# Patient Record
Sex: MALE | Race: WHITE | NOT HISPANIC OR LATINO | Employment: FULL TIME | URBAN - METROPOLITAN AREA
[De-identification: names, ages, dates, MRNs, and addresses within clinical notes are randomized per-mention and may not be internally consistent; named-entity substitution may affect disease eponyms.]

---

## 2017-01-26 ENCOUNTER — TELEPHONE (OUTPATIENT)
Dept: FAMILY MEDICINE | Facility: CLINIC | Age: 42
End: 2017-01-26

## 2017-01-26 NOTE — TELEPHONE ENCOUNTER
Patient informed as below.  Said he may be able to get a coupon on line for 50% off and will see if he can afford it then.  Christine Rdz RN

## 2017-01-26 NOTE — TELEPHONE ENCOUNTER
Please inform pt this med is not ever covered by insurance and must be paid for out of pocket.    Lyndsey Melendrez, CNP

## 2017-01-26 NOTE — TELEPHONE ENCOUNTER
Writer called patient left non detailed message requesting return call to clinic and ask to speak with nurse    Javier Bradford RN

## 2017-01-26 NOTE — TELEPHONE ENCOUNTER
Received notification from [employer] that Viagra is not covered by the patient's insurance. (PA already started by [facility]:[id number])    Would you like me to start a PA?    Flaquita Escalante, CMA

## 2019-09-30 ENCOUNTER — OFFICE VISIT (OUTPATIENT)
Dept: URGENT CARE | Facility: URGENT CARE | Age: 44
End: 2019-09-30
Payer: COMMERCIAL

## 2019-09-30 VITALS
HEART RATE: 116 BPM | WEIGHT: 167 LBS | BODY MASS INDEX: 24.66 KG/M2 | DIASTOLIC BLOOD PRESSURE: 80 MMHG | OXYGEN SATURATION: 97 % | SYSTOLIC BLOOD PRESSURE: 135 MMHG | TEMPERATURE: 98.2 F

## 2019-09-30 DIAGNOSIS — L08.9 SKIN INFECTION: Primary | ICD-10-CM

## 2019-09-30 DIAGNOSIS — L30.0 NUMMULAR DERMATITIS: ICD-10-CM

## 2019-09-30 PROCEDURE — 99203 OFFICE O/P NEW LOW 30 MIN: CPT | Performed by: NURSE PRACTITIONER

## 2019-09-30 RX ORDER — TRIAMCINOLONE ACETONIDE 1 MG/G
OINTMENT TOPICAL
Qty: 80 G | Refills: 3 | Status: SHIPPED | OUTPATIENT
Start: 2019-09-30 | End: 2023-02-17

## 2019-09-30 RX ORDER — CEPHALEXIN 500 MG/1
500 CAPSULE ORAL 4 TIMES DAILY
Qty: 28 CAPSULE | Refills: 0 | Status: SHIPPED | OUTPATIENT
Start: 2019-09-30 | End: 2019-10-07

## 2019-09-30 RX ORDER — HYDROXYZINE PAMOATE 25 MG/1
25 CAPSULE ORAL 4 TIMES DAILY PRN
Qty: 60 CAPSULE | Refills: 1 | Status: SHIPPED | OUTPATIENT
Start: 2019-09-30 | End: 2023-02-17

## 2019-09-30 RX ORDER — CLOBETASOL PROPIONATE 0.5 MG/G
OINTMENT TOPICAL
Qty: 80 G | Refills: 3 | Status: SHIPPED | OUTPATIENT
Start: 2019-09-30 | End: 2023-07-31

## 2019-09-30 RX ORDER — PREDNISONE 20 MG/1
40 TABLET ORAL DAILY
Qty: 10 TABLET | Refills: 0 | Status: SHIPPED | OUTPATIENT
Start: 2019-09-30 | End: 2019-10-05

## 2019-09-30 ASSESSMENT — ENCOUNTER SYMPTOMS
BACK PAIN: 0
DIZZINESS: 0
COUGH: 0
NAUSEA: 0
DIARRHEA: 0
ABDOMINAL PAIN: 0
ADENOPATHY: 0
FEVER: 0
MYALGIAS: 0
RHINORRHEA: 0
SORE THROAT: 0
HEADACHES: 0
VOMITING: 0
LIGHT-HEADEDNESS: 0

## 2019-09-30 NOTE — PATIENT INSTRUCTIONS
Cephalexin four times a day for 7 days  Hydroxyzine four times a day as needed for itching  Triamcinolone cream twice a day as needed to rash  Clobetasol at night to rash  Prednisone daily for 5 days  Push fluids  Tylenol and ibuprofen to help with pain  Cool compress can help with itching.

## 2019-09-30 NOTE — PROGRESS NOTES
SUBJECTIVE:   Antonio Burger is a 44 year old male presenting with a chief complaint of   Chief Complaint   Patient presents with     Urgent Care     Pt in clinic to have eval for rash on legs, feet, hands and elbows.     Derm Problem       He is a new patient of [facility].   Rash    Onset of rash was 2 month(s) ago.   Course of illness is worsening.  Severity moderately severe  Current and Associated symptoms: itching, burning, painful, red and blistering   Location of the rash: Bilateral ankles, legs, bilateral elbows, bilateral hands.   Previous history of a similar rash? Yes  Recent exposure history: none known  Denies exposure to: animals, dietary change, environmental allergens, impetigo, medications, new household products, new skincare products, ringworm, scabies, strep, tick (unknown type) and viral illness  Associated symptoms include: nothing.  Treatment measures tried include: steriod cream  History of nummular eczema.       Review of Systems   Constitutional: Negative for fever.   HENT: Negative for congestion, rhinorrhea and sore throat.    Eyes: Negative for visual disturbance.   Respiratory: Negative for cough.    Cardiovascular: Negative for chest pain.   Gastrointestinal: Negative for abdominal pain, diarrhea, nausea and vomiting.   Musculoskeletal: Negative for back pain and myalgias.   Skin: Positive for rash.   Allergic/Immunologic: Negative for environmental allergies and food allergies.   Neurological: Negative for dizziness, light-headedness and headaches.   Hematological: Negative for adenopathy.       Past Medical History:   Diagnosis Date     Anxiety      Depressive disorder      Lipoma of other specified sites  2007     neck area- plans observation     Narcolepsy without cataplexy in conditions classified elsewhere      Tobacco use disorder quit on 12/26/11    Smokes 1 ppd. Started at 16 years     Family History   Problem Relation Age of Onset     Diabetes Mother         type 2      Hypertension Mother      Cancer Maternal Grandmother         stomach     Cancer Paternal Grandmother         ?     Depression Mother      Gastrointestinal Disease Father         reflux     Current Outpatient Medications   Medication Sig Dispense Refill     cephALEXin (KEFLEX) 500 MG capsule Take 1 capsule (500 mg) by mouth 4 times daily for 7 days 28 capsule 0     clobetasol (TEMOVATE) 0.05 % external ointment Apply thin layer at night to affected area on backs of hands and legs. Cover legs with saran wrap. 80 g 3     hydrOXYzine (VISTARIL) 25 MG capsule Take 1 capsule (25 mg) by mouth 4 times daily as needed for itching 60 capsule 1     Methylphenidate HCl (RITALIN PO) Take 10 mg by mouth 8 times daily.         predniSONE (DELTASONE) 20 MG tablet Take 2 tablets (40 mg) by mouth daily for 5 days 10 tablet 0     triamcinolone (KENALOG) 0.1 % external ointment Apply topically twice daily to affected areas as needed. 80 g 3     desonide (DESOWEN) 0.05 % cream APPLY TWICE DAILY TO FACE IF ECZEMA IS FLARING (Patient not taking: Reported on 2019) 60 g 0     sildenafil (VIAGRA) 100 MG tablet Take 0.5-1 tablets ( mg) by mouth daily as needed for erectile dysfunction Take 30 min to 4 hours before intercourse.  Never use with nitroglycerin, terazosin or doxazosin. (Patient not taking: Reported on 2019) 6 tablet 11     varenicline (CHANTIX STARTING MONTH ) 0.5 MG X 11 & 1 MG X 42 tablet Take 0.5 mg tab daily for 3 days, then 0.5 mg tab twice daily for 4 days, then 1 mg twice daily. (Patient not taking: Reported on 2019) 53 tablet 2     Social History     Tobacco Use     Smoking status: Former Smoker     Packs/day: 1.00     Years: 13.00     Pack years: 13.00     Types: Cigarettes     Last attempt to quit: 2011     Years since quittin.7     Smokeless tobacco: Never Used   Substance Use Topics     Alcohol use: Yes     Comment: occ       OBJECTIVE  /80   Pulse 116   Temp 98.2  F (36.8  C)  (Oral)   Wt 75.8 kg (167 lb)   SpO2 97%   BMI 24.66 kg/m      Physical Exam  Vitals signs and nursing note reviewed.   Constitutional:       Appearance: Normal appearance. He is well-developed.   HENT:      Mouth/Throat:      Mouth: Mucous membranes are moist.      Pharynx: Oropharynx is clear.   Cardiovascular:      Rate and Rhythm: Normal rate and regular rhythm.      Heart sounds: Normal heart sounds.   Pulmonary:      Effort: Pulmonary effort is normal.      Breath sounds: Normal breath sounds and air entry.   Skin:     Comments: Patient has erythematous, dry, flaking, and plaque like lesions note to bilateral feet, ankles, lower legs, bilateral hands and elbows. Does also have noted erythematous swelling lesions as a secondary skin infection from itching noted.    Neurological:      Mental Status: He is alert and oriented to person, place, and time.   Psychiatric:         Behavior: Behavior is cooperative.           ASSESSMENT:      ICD-10-CM    1. Skin infection L08.9 cephALEXin (KEFLEX) 500 MG capsule     hydrOXYzine (VISTARIL) 25 MG capsule     DERMATOLOGY REFERRAL   2. Nummular dermatitis L30.0 hydrOXYzine (VISTARIL) 25 MG capsule     clobetasol (TEMOVATE) 0.05 % external ointment     triamcinolone (KENALOG) 0.1 % external ointment     predniSONE (DELTASONE) 20 MG tablet     DERMATOLOGY REFERRAL        Medical Decision Making:    Differential Diagnosis:  Rash: Atopic dermatitis  Cellulitis  Dermatitis  Eczema  Inflammation  Non-specific rash  Urticaria    Serious Comorbid Conditions:  Adult:  None    PLAN:  Discussed with patient that rash does appear to be a nummular dermatitis/eczema as previous diagnosed in the past, however, also appears to have a secondary skin infection like due to opening up of the skin due to itching. Will treat with Cephalexin four times a day for 7 days. Hydroxyzine four times a day as needed for itching. Triamcinolone cream twice a day as needed to rash. Clobetasol at night to  rash. Prednisone daily for 5 days. Additional symptomatic treatment recommendations discussed. Will also do referral to dermatology for follow up. Education was added to AVS. Patient was agreeable to plan and verbalized understanding.     Followup:    If not improving in 5-7 days or if condition worsens, follow up with your Primary Care Provider    Patient Instructions   Cephalexin four times a day for 7 days  Hydroxyzine four times a day as needed for itching  Triamcinolone cream twice a day as needed to rash  Clobetasol at night to rash  Prednisone daily for 5 days  Push fluids  Tylenol and ibuprofen to help with pain  Cool compress can help with itching.

## 2019-10-03 ENCOUNTER — OFFICE VISIT (OUTPATIENT)
Dept: DERMATOLOGY | Facility: CLINIC | Age: 44
End: 2019-10-03
Attending: NURSE PRACTITIONER
Payer: COMMERCIAL

## 2019-10-03 DIAGNOSIS — L30.9 DERMATITIS: Primary | ICD-10-CM

## 2019-10-03 RX ORDER — CLOBETASOL PROPIONATE 0.5 MG/G
OINTMENT TOPICAL
Qty: 60 G | Refills: 3 | Status: SHIPPED | OUTPATIENT
Start: 2019-10-03 | End: 2021-09-15

## 2019-10-03 RX ORDER — TRIAMCINOLONE ACETONIDE 1 MG/G
OINTMENT TOPICAL
Qty: 454 G | Refills: 3 | Status: SHIPPED | OUTPATIENT
Start: 2019-10-03 | End: 2022-05-31

## 2019-10-03 ASSESSMENT — PAIN SCALES - GENERAL: PAINLEVEL: NO PAIN (1)

## 2019-10-03 NOTE — PROGRESS NOTES
"[facility] Dermatology Note      Dermatology Problem List:  1. Dermatitis, bilateral lower extremities, upper extremities, hands. Prior biopsy 2012 with non-specific dermal inflammation. Ddx nummular dermatitis / atopic dermatitis, though also considered psoriasis / psoriasiform drug, less likely lichen planus, atypical manifestation of an autoimmune connective tissue disorder.    - triam 0.1% ointment /  clobetasol 0.05% ointment BID PRN for legs / arms  - desonide cream BID PRN for face  - prior tx: oral prednisone    CC:   Chief Complaint   Patient presents with     Derm Problem     Antonio is here today for Nummular Dermatitis follow up. Antonio is a previous patient of Dr. Kang. Antonio states \" I am on a shit ton of steroids\" which are helping to calm down his flare ups.      Encounter Date: Oct 3, 2019    History of Present Illness:  Mr. Antonio Burger is a 44 year old male who presents as a follow-up for suspected nummular dermatitis. The patient has a complicated past medical history and has prior been admitted for exuberant dermatitis on his bilateral lower extremities and hands. Biopsies in 2012 demonstrated non-specific dermal inflammation with some mast cells. He has since largely been managed on topical steroids with a presumptive diagnosis of nummular dermatitis. He reports a recent flare in the last week with numerous, very pruritic lesions on the bilateral lower extremities that became eroded from excessive scratching. He presented to ED/urgent care and was prescribed a short course of oral prednisone for which he is completing tomorrow. He reports significant improvement with prednisone. At home, he uses combination of triam 0.1% ointment and clobetasol 0.5% ointment for active areas, nearly \"all the time\". He reports no new inciting events. No recent illness, fevers, chills. No new medications. He is frustrated that his condition has not improved and he has continued to have to " "use topical steroids to \"keep things at bay\". Health is otherwise stable. No other skin concerns.      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 Started at 16 years     Past Surgical History:   Procedure Laterality Date     NO HISTORY OF SURGERY         Social History:  Patient reports that he quit smoking about 7 years ago. His smoking use included cigarettes. He has a 13.00 pack-year smoking history. He has never used smokeless tobacco. He reports current alcohol use. He reports that he does not use drugs.    Family History:  Family History   Problem Relation Age of Onset     Diabetes Mother         type 2     Hypertension Mother      Cancer Maternal Grandmother         stomach     Cancer Paternal Grandmother         ?     Depression Mother      Gastrointestinal Disease Father         reflux       Medications:  Current Outpatient Medications   Medication Sig Dispense Refill     cephALEXin (KEFLEX) 500 MG capsule Take 1 capsule (500 mg) by mouth 4 times daily for 7 days 28 capsule 0     clobetasol (TEMOVATE) 0.05 % external ointment Apply thin layer at night to affected area on backs of hands and legs. Cover legs with saran wrap. 80 g 3     desonide (DESOWEN) 0.05 % cream APPLY TWICE DAILY TO FACE IF ECZEMA IS FLARING (Patient not taking: Reported on 9/30/2019) 60 g 0     hydrOXYzine (VISTARIL) 25 MG capsule Take 1 capsule (25 mg) by mouth 4 times daily as needed for itching 60 capsule 1     Methylphenidate HCl (RITALIN PO) Take 10 mg by mouth 8 times daily.         predniSONE " (DELTASONE) 20 MG tablet Take 2 tablets (40 mg) by mouth daily for 5 days 10 tablet 0     sildenafil (VIAGRA) 100 MG tablet Take 0.5-1 tablets ( mg) by mouth daily as needed for erectile dysfunction Take 30 min to 4 hours before intercourse.  Never use with nitroglycerin, terazosin or doxazosin. (Patient not taking: Reported on 9/30/2019) 6 tablet 11     triamcinolone (KENALOG) 0.1 % external ointment Apply topically twice daily to affected areas as needed. 80 g 3     varenicline (CHANTIX STARTING MONTH PAK) 0.5 MG X 11 & 1 MG X 42 tablet Take 0.5 mg tab daily for 3 days, then 0.5 mg tab twice daily for 4 days, then 1 mg twice daily. (Patient not taking: Reported on 9/30/2019) 53 tablet 2     Allergies   Allergen Reactions     No Known Drug Allergies      Review of Systems:  -Skin Establ Pt: The patient denies any new rash, pruritus, or lesions that are symptomatic, changing or bleeding, except as per HPI.  -Constitutional: Otherwise feeling well today, in usual state of health.  -HEENT: Patient denies nonhealing oral sores.  -Skin: As above in HPI. No additional skin concerns.    Physical exam:  Vitals: There were no vitals taken for this visit.  GEN: This is a well developed, well-nourished male in no acute distress, in a pleasant mood.    SKIN: Focused examination of the face, neck, chest, abdomen, back, upper and lower extremities including hands/feet was performed.  - Sykes skin type: II  - Numerous pink scaly thick plaques on bilateral lower extremities some with central crusted erosions in linear pattern.  - Pink scaly plaques over DIP/PIP/MCP joints bilaterally  - Lichenified scaly papules on volar wrists bilaterally and elbows.   - No other lesions of concern on areas examined.     Impression/Plan:    1. Dermatitis, bilateral lower extremities, upper extremities. Prior biopsy in 2012 with non-specific findings. Prior managed as nummular dermatitis largely with topical steroids. Recent flare with  unknown trigger. Patient has a constellation of atypical findings suggestive of possible alternative diagnosis, including dipika-articular plaques that could be suggestive of psoriasis or an atypical presentation of a connective tissue disease such as dermatomyositis, more lichenoid appearing papules on volar wrists and elbows, which could be consistent with a lichenified eczematous process or lichen planus. I felt the utility of biopsy was minimal given the patient has recently been taking prednisone. Recommending completing course and continue with use of topical steroids. Will arrange for close follow-up and consider repeat biopsy at that visit.       Finish prednisone course    Start triam 0.1% ointment BID for arms and legs    Start clobetasol 0.05% ointment BID PRN for any problem areas; can perform under saran wrap occlusion    Follow-up in 4 weeks and consider biopsy at visit if not improved    CC Krish Geronimo, CNP  [address] [place] [address] on close of this encounter.  Follow-up in 4 weeks, earlier for new or changing lesions.       Staff Involved:  Staff Only    Gaston Spivey MD    Department of Dermatology  [facility]: Phone: [phone], Fax:[phone]  [facility]: Phone: [phone], Fax: [phone]

## 2019-10-03 NOTE — PATIENT INSTRUCTIONS
1. Finish prednisone taper  2. Start triamcinolone 0.1% ointment twice daily to rash on arms and legs.   3. Start clobetasol 0.05% ointment twice daily to problem areas. Can perform under occlusion at nighttime.   4. Moisturize at least twice daily.   5. Follow-up in 4 weeks.

## 2019-10-03 NOTE — LETTER
"10/3/2019       RE: Antonio Burger  [address]  [place] [address]     Dear Colleague,    Thank you for referring your patient, Antonio Burger, to the [facility] DERMATOLOGY at [facility]. Please see a copy of my visit note below.    [facility] Dermatology Note      Dermatology Problem List:  1. Dermatitis, bilateral lower extremities, upper extremities, hands. Prior biopsy 2012 with non-specific dermal inflammation. Ddx nummular dermatitis / atopic dermatitis, though also considered psoriasis / psoriasiform drug, less likely lichen planus, atypical manifestation of an autoimmune connective tissue disorder.    - triam 0.1% ointment /  clobetasol 0.05% ointment BID PRN for legs / arms  - desonide cream BID PRN for face  - prior tx: oral prednisone    CC:   Chief Complaint   Patient presents with     Derm Problem     Antonio is here today for Nummular Dermatitis follow up. Antonio is a previous patient of Dr. Kang. Antonio states \" I am on a shit ton of steroids\" which are helping to calm down his flare ups.      Encounter Date: Oct 3, 2019    History of Present Illness:  Mr. Antonio Burger is a 44 year old male who presents as a follow-up for suspected nummular dermatitis. The patient has a complicated past medical history and has prior been admitted for exuberant dermatitis on his bilateral lower extremities and hands. Biopsies in 2012 demonstrated non-specific dermal inflammation with some mast cells. He has since largely been managed on topical steroids with a presumptive diagnosis of nummular dermatitis. He reports a recent flare in the last week with numerous, very pruritic lesions on the bilateral lower extremities that became eroded from excessive scratching. He presented to ED/urgent care and was prescribed a short course of oral prednisone for which he is completing tomorrow. He reports significant improvement with prednisone. At home, he uses combination " "of triam 0.1% ointment and clobetasol 0.5% ointment for active areas, nearly \"all the time\". He reports no new inciting events. No recent illness, fevers, chills. No new medications. He is frustrated that his condition has not improved and he has continued to have to use topical steroids to \"keep things at bay\". Health is otherwise stable. No other skin concerns.      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 Started at 16 years     Past Surgical History:   Procedure Laterality Date     NO HISTORY OF SURGERY         Social History:  Patient reports that he quit smoking about 7 years ago. His smoking use included cigarettes. He has a 13.00 pack-year smoking history. He has never used smokeless tobacco. He reports current alcohol use. He reports that he does not use drugs.    Family History:  Family History   Problem Relation Age of Onset     Diabetes Mother         type 2     Hypertension Mother      Cancer Maternal Grandmother         stomach     Cancer Paternal Grandmother         ?     Depression Mother      Gastrointestinal Disease Father         reflux       Medications:  Current Outpatient Medications   Medication Sig Dispense Refill     cephALEXin (KEFLEX) 500 MG capsule Take 1 capsule (500 mg) by mouth 4 times daily for 7 days 28 capsule 0     clobetasol (TEMOVATE) 0.05 % external ointment Apply thin layer at night to affected area on backs of hands and legs. Cover legs with saran wrap. 80 g 3     desonide (DESOWEN) 0.05 % cream APPLY TWICE DAILY TO FACE IF ECZEMA IS FLARING " (Patient not taking: Reported on 9/30/2019) 60 g 0     hydrOXYzine (VISTARIL) 25 MG capsule Take 1 capsule (25 mg) by mouth 4 times daily as needed for itching 60 capsule 1     Methylphenidate HCl (RITALIN PO) Take 10 mg by mouth 8 times daily.         predniSONE (DELTASONE) 20 MG tablet Take 2 tablets (40 mg) by mouth daily for 5 days 10 tablet 0     sildenafil (VIAGRA) 100 MG tablet Take 0.5-1 tablets ( mg) by mouth daily as needed for erectile dysfunction Take 30 min to 4 hours before intercourse.  Never use with nitroglycerin, terazosin or doxazosin. (Patient not taking: Reported on 9/30/2019) 6 tablet 11     triamcinolone (KENALOG) 0.1 % external ointment Apply topically twice daily to affected areas as needed. 80 g 3     varenicline (CHANTIX STARTING MONTH PAK) 0.5 MG X 11 & 1 MG X 42 tablet Take 0.5 mg tab daily for 3 days, then 0.5 mg tab twice daily for 4 days, then 1 mg twice daily. (Patient not taking: Reported on 9/30/2019) 53 tablet 2     Allergies   Allergen Reactions     No Known Drug Allergies      Review of Systems:  -Skin Establ Pt: The patient denies any new rash, pruritus, or lesions that are symptomatic, changing or bleeding, except as per HPI.  -Constitutional: Otherwise feeling well today, in usual state of health.  -HEENT: Patient denies nonhealing oral sores.  -Skin: As above in HPI. No additional skin concerns.    Physical exam:  Vitals: There were no vitals taken for this visit.  GEN: This is a well developed, well-nourished male in no acute distress, in a pleasant mood.    SKIN: Focused examination of the face, neck, chest, abdomen, back, upper and lower extremities including hands/feet was performed.  - Sykes skin type: II  - Numerous pink scaly thick plaques on bilateral lower extremities some with central crusted erosions in linear pattern.  - Pink scaly plaques over DIP/PIP/MCP joints bilaterally  - Lichenified scaly papules on volar wrists bilaterally and elbows.   - No  other lesions of concern on areas examined.     Impression/Plan:    1. Dermatitis, bilateral lower extremities, upper extremities. Prior biopsy in 2012 with non-specific findings. Prior managed as nummular dermatitis largely with topical steroids. Recent flare with unknown trigger. Patient has a constellation of atypical findings suggestive of possible alternative diagnosis, including dipika-articular plaques that could be suggestive of psoriasis or an atypical presentation of a connective tissue disease such as dermatomyositis, more lichenoid appearing papules on volar wrists and elbows, which could be consistent with a lichenified eczematous process or lichen planus. I felt the utility of biopsy was minimal given the patient has recently been taking prednisone. Recommending completing course and continue with use of topical steroids. Will arrange for close follow-up and consider repeat biopsy at that visit.       Finish prednisone course    Start triam 0.1% ointment BID for arms and legs    Start clobetasol 0.05% ointment BID PRN for any problem areas; can perform under saran wrap occlusion    Follow-up in 4 weeks and consider biopsy at visit if not improved    CC Krish Geronimo, CNP  [address] [place] [id number] on close of this encounter.  Follow-up in 4 weeks, earlier for new or changing lesions.       Staff Involved:  Staff Only    Gaston Spivey MD    Department of Dermatology  [facility]: Phone: [phone], Fax:[phone]  [facility]: Phone: [phone], Fax: [phone]

## 2019-10-03 NOTE — NURSING NOTE
"Chief Complaint   Patient presents with     Derm Problem     Antonio is here today for Nummular Dermatitis follow up. Antonio is a previous patient of Dr. Kang. Antonio states \" I am on a shit ton of steroids\" (Triamcinolone, Clobetasol, Prednosone, and Keflex)  which are helping to calm down his flare ups.      Ely Bustillo, BERNA    "

## 2019-10-31 ENCOUNTER — OFFICE VISIT (OUTPATIENT)
Dept: DERMATOLOGY | Facility: CLINIC | Age: 44
End: 2019-10-31
Payer: COMMERCIAL

## 2019-10-31 DIAGNOSIS — R21 RASH AND OTHER NONSPECIFIC SKIN ERUPTION: Primary | ICD-10-CM

## 2019-10-31 RX ORDER — DEXTROAMPHETAMINE SACCHARATE, AMPHETAMINE ASPARTATE, DEXTROAMPHETAMINE SULFATE AND AMPHETAMINE SULFATE 2.5; 2.5; 2.5; 2.5 MG/1; MG/1; MG/1; MG/1
10 TABLET ORAL 2 TIMES DAILY
COMMUNITY
End: 2023-07-31

## 2019-10-31 ASSESSMENT — PAIN SCALES - GENERAL
PAINLEVEL: NO PAIN (0)
PAINLEVEL: NO PAIN (1)

## 2019-10-31 NOTE — NURSING NOTE
Photo of biopsy site placed in patient chart for reference. Ely Bustillo LPN      Lidocaine-epinephrine 1-1:080298 % injection   1.5 mL once for one use, starting 10/31/2019 ending 10/31/2019,  2mL disp, R-0, injection  Injected by Dr. Spivey

## 2019-10-31 NOTE — PROGRESS NOTES
"[facility] Dermatology Note      Dermatology Problem List:  1. Dermatitis, bilateral lower extremities, upper extremities, hands. Prior biopsy 2012 with non-specific dermal inflammation. Ddx nummular dermatitis / atopic dermatitis, though also considered psoriasis / psoriasiform drug, less likely lichen planus, atypical manifestation of an autoimmune connective tissue disorder.    - triam 0.1% ointment /  clobetasol 0.05% ointment BID PRN for legs / arms  - desonide cream BID PRN for face  - prior tx: oral prednisone    CC:   Chief Complaint   Patient presents with     Derm Problem     Antonio is here today for Dermatitis of hands, feet, legs and elbows. Antonio notes improvement since last visit.      Encounter Date: Oct 31, 2019    History of Present Illness:  Mr. Antonio Burger is a 44 year old male who presents as a follow-up for a diffuse dermatitis on his lower and upper extremities, hands. Last seen about 3-4 weeks ago, when he had recently been started on an oral prednisone taper for a flare. His topical medications were adjusted and continued on oral prednisone taper, with plan for close follow-up to consider biopsy.     Today, he reports the dermatitis on his hands and legs is much improved from prior. He completed his prednisone course shortly after his last visit and has since been using a combination of triam 0.1% ointment and clobetasol 0.05% ointment. He still has a few active areas on the arms and hands, though today is a \"good day\". He is interested in more long-term options to control his flares given he has had multiple rounds of oral prednisone with only temporary improvement. Health is otherwise stable. No other skin concerns.     Past Medical History:   Patient Active Problem List   Diagnosis     CARDIOVASCULAR SCREENING; LDL GOAL LESS THAN 160     Cellulitis     Tobacco abuse     Nummular eczema     Nummular dermatitis     Past Medical History:   Diagnosis Date     Anxiety      " Depressive disorder      Lipoma of other specified sites  2007     neck area- plans observation     Narcolepsy without cataplexy in conditions classified elsewhere      Tobacco use disorder quit on 12/26/11    Smokes 1 ppd. Started at 16 years     Past Surgical History:   Procedure Laterality Date     NO HISTORY OF SURGERY         Social History:  Patient reports that he quit smoking about 7 years ago. His smoking use included cigarettes. He has a 13.00 pack-year smoking history. He has never used smokeless tobacco. He reports current alcohol use. He reports that he does not use drugs.    Family History:  Family History   Problem Relation Age of Onset     Diabetes Mother         type 2     Hypertension Mother      Cancer Maternal Grandmother         stomach     Cancer Paternal Grandmother         ?     Depression Mother      Gastrointestinal Disease Father         reflux       Medications:  Current Outpatient Medications   Medication Sig Dispense Refill     amphetamine-dextroamphetamine (ADDERALL) 10 MG tablet Take 10 mg by mouth 2 times daily       clobetasol (TEMOVATE) 0.05 % external ointment Apply twice daily as needed for itchy spots on legs. 60 g 3     clobetasol (TEMOVATE) 0.05 % external ointment Apply thin layer at night to affected area on backs of hands and legs. Cover legs with saran wrap. 80 g 3     hydrOXYzine (VISTARIL) 25 MG capsule Take 1 capsule (25 mg) by mouth 4 times daily as needed for itching 60 capsule 1     triamcinolone (KENALOG) 0.1 % external ointment Apply twice daily to any itchy areas on hands, arms, or legs. 454 g 3     desonide (DESOWEN) 0.05 % cream APPLY TWICE DAILY TO FACE IF ECZEMA IS FLARING (Patient not taking: Reported on 9/30/2019) 60 g 0     Methylphenidate HCl (RITALIN PO) Take 10 mg by mouth 8 times daily.         sildenafil (VIAGRA) 100 MG tablet Take 0.5-1 tablets ( mg) by mouth daily as needed for erectile dysfunction Take 30 min to 4 hours before intercourse.   Never use with nitroglycerin, terazosin or doxazosin. (Patient not taking: Reported on 9/30/2019) 6 tablet 11     triamcinolone (KENALOG) 0.1 % external ointment Apply topically twice daily to affected areas as needed. (Patient not taking: Reported on 10/31/2019) 80 g 3     varenicline (CHANTIX STARTING MONTH NATHANAEL) 0.5 MG X 11 & 1 MG X 42 tablet Take 0.5 mg tab daily for 3 days, then 0.5 mg tab twice daily for 4 days, then 1 mg twice daily. (Patient not taking: Reported on 9/30/2019) 53 tablet 2     Allergies   Allergen Reactions     No Known Drug Allergies      Review of Systems:  -Skin Establ Pt: The patient denies any new rash, pruritus, or lesions that are symptomatic, changing or bleeding, except as per HPI.  -Constitutional: Otherwise feeling well today, in usual state of health.  -HEENT: Patient denies nonhealing oral sores.  -Skin: As above in HPI. No additional skin concerns.    Physical exam:  Vitals: There were no vitals taken for this visit.  GEN: This is a well developed, well-nourished male in no acute distress, in a pleasant mood.    SKIN: Focused examination of the face, neck, chest, abdomen, back, upper and lower extremities including hands/feet was performed.  - Sykes skin type: II  - Few thin pink scaly plaques on the bilateral dorsal fingertips.   - Hyperpigmented patches and plaques with minimal scale on the bilateral lower shins.  - Lichenified plaques on the right forearm with overlying scale.  - No other lesions of concern on areas examined.     Impression/Plan:    1. Dermatitis, bilateral lower extremities, upper extremities. Prior biopsy in 2012 with non-specific findings. Prior managed as nummular dermatitis largely with topical steroids. Recent flare with unknown trigger. Patient has had excellent response to oral prednisone, though we discussed this is not a sustainable long-term treatment strategy for his dermatitis. Recommended biopsy to evaluate for etiology. Discussed possible  systemic treatment options including nbUVB, methotrexate, apremilast, and dupixent if his biopsy as consistent with a nummular or eczematous dermatitis.       Biopsy at below    Continue triam 0.1% ointment BID for arms and legs    Continue clobetasol 0.05% ointment BID PRN for any problem areas; can perform under saran wrap occlusion    Consider dupixent if c/w nummular dermatitis / atopic dermatitis / lichen simplex chronicus    PROCEDURE NOTE  Punch biopsy:  After discussion of benefits and risks including but not limited to bleeding/bruising, pain/swelling, infection, scar, incomplete removal, nerve damage/numbness, recurrence, and non-diagnostic biopsy, written consent, verbal consent and photographs were obtained. Time-out was performed. The area was cleaned with isopropyl alcohol. 0.5mL of 1% lidocaine with epinephrine was injected to obtain adequate anesthesia of the lesion on the right forearm. A 4 mm punch biopsy was performed.  4-0 prolene sutures were utilized to approximate the epidermal edges.  White petroleum jelly/VaselineTM and a bandage was applied to the wound.  Explicit verbal and written wound care instructions were provided.  The patient left the Dermatology Clinic in good condition. The patient was counseled to follow up for suture removal in approximately 14 days. Patient to remove sutures at home.     CC Krish Geronimo, CNP  [address] [place] [address] on close of this encounter.    Follow-up PRN pending biopsy result.     Staff Involved:  Staff Only    Gaston Spivey MD    Department of Dermatology  [facility]: Phone: [phone], Fax:[phone]  [facility]: Phone: [phone], Fax: [phone]

## 2019-10-31 NOTE — NURSING NOTE
Chief Complaint   Patient presents with     Derm Problem     Antonio is here today for Dermatitis of hands, feet, legs and elbows. Antonio notes improvement since last visit.      Ely Bustillo LPN

## 2019-10-31 NOTE — PATIENT INSTRUCTIONS

## 2019-10-31 NOTE — LETTER
"10/31/2019       RE: Antonio Burger  [address]  [place] [address]     Dear Colleague,    Thank you for referring your patient, Antonio Burger, to the [facility]ERMATOLOGY at [facility]. Please see a copy of my visit note below.    [facility] Dermatology Note      Dermatology Problem List:  1. Dermatitis, bilateral lower extremities, upper extremities, hands. Prior biopsy 2012 with non-specific dermal inflammation. Ddx nummular dermatitis / atopic dermatitis, though also considered psoriasis / psoriasiform drug, less likely lichen planus, atypical manifestation of an autoimmune connective tissue disorder.    - triam 0.1% ointment /  clobetasol 0.05% ointment BID PRN for legs / arms  - desonide cream BID PRN for face  - prior tx: oral prednisone    CC:   Chief Complaint   Patient presents with     Derm Problem     Antonio is here today for Dermatitis of hands, feet, legs and elbows. Antonio notes improvement since last visit.      Encounter Date: Oct 31, 2019    History of Present Illness:  Mr. Antonio Burger is a 44 year old male who presents as a follow-up for a diffuse dermatitis on his lower and upper extremities, hands. Last seen about 3-4 weeks ago, when he had recently been started on an oral prednisone taper for a flare. His topical medications were adjusted and continued on oral prednisone taper, with plan for close follow-up to consider biopsy.     Today, he reports the dermatitis on his hands and legs is much improved from prior. He completed his prednisone course shortly after his last visit and has since been using a combination of triam 0.1% ointment and clobetasol 0.05% ointment. He still has a few active areas on the arms and hands, though today is a \"good day\". He is interested in more long-term options to control his flares given he has had multiple rounds of oral prednisone with only temporary improvement. Health is otherwise stable. No other " skin concerns.     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 Started at 16 years     Past Surgical History:   Procedure Laterality Date     NO HISTORY OF SURGERY         Social History:  Patient reports that he quit smoking about 7 years ago. His smoking use included cigarettes. He has a 13.00 pack-year smoking history. He has never used smokeless tobacco. He reports current alcohol use. He reports that he does not use drugs.    Family History:  Family History   Problem Relation Age of Onset     Diabetes Mother         type 2     Hypertension Mother      Cancer Maternal Grandmother         stomach     Cancer Paternal Grandmother         ?     Depression Mother      Gastrointestinal Disease Father         reflux       Medications:  Current Outpatient Medications   Medication Sig Dispense Refill     amphetamine-dextroamphetamine (ADDERALL) 10 MG tablet Take 10 mg by mouth 2 times daily       clobetasol (TEMOVATE) 0.05 % external ointment Apply twice daily as needed for itchy spots on legs. 60 g 3     clobetasol (TEMOVATE) 0.05 % external ointment Apply thin layer at night to affected area on backs of hands and legs. Cover legs with saran wrap. 80 g 3     hydrOXYzine (VISTARIL) 25 MG capsule Take 1 capsule (25 mg) by mouth 4 times daily as needed for itching 60 capsule 1     triamcinolone (KENALOG) 0.1 % external ointment Apply twice daily to any itchy areas on hands, arms, or legs. 454 g 3     desonide (DESOWEN) 0.05 % cream APPLY TWICE DAILY TO FACE IF ECZEMA IS FLARING (Patient not  taking: Reported on 9/30/2019) 60 g 0     Methylphenidate HCl (RITALIN PO) Take 10 mg by mouth 8 times daily.         sildenafil (VIAGRA) 100 MG tablet Take 0.5-1 tablets ( mg) by mouth daily as needed for erectile dysfunction Take 30 min to 4 hours before intercourse.  Never use with nitroglycerin, terazosin or doxazosin. (Patient not taking: Reported on 9/30/2019) 6 tablet 11     triamcinolone (KENALOG) 0.1 % external ointment Apply topically twice daily to affected areas as needed. (Patient not taking: Reported on 10/31/2019) 80 g 3     varenicline (CHANTIX STARTING MONTH PAK) 0.5 MG X 11 & 1 MG X 42 tablet Take 0.5 mg tab daily for 3 days, then 0.5 mg tab twice daily for 4 days, then 1 mg twice daily. (Patient not taking: Reported on 9/30/2019) 53 tablet 2     Allergies   Allergen Reactions     No Known Drug Allergies      Review of Systems:  -Skin Establ Pt: The patient denies any new rash, pruritus, or lesions that are symptomatic, changing or bleeding, except as per HPI.  -Constitutional: Otherwise feeling well today, in usual state of health.  -HEENT: Patient denies nonhealing oral sores.  -Skin: As above in HPI. No additional skin concerns.    Physical exam:  Vitals: There were no vitals taken for this visit.  GEN: This is a well developed, well-nourished male in no acute distress, in a pleasant mood.    SKIN: Focused examination of the face, neck, chest, abdomen, back, upper and lower extremities including hands/feet was performed.  - Sykes skin type: II  - Few thin pink scaly plaques on the bilateral dorsal fingertips.   - Hyperpigmented patches and plaques with minimal scale on the bilateral lower shins.  - Lichenified plaques on the right forearm with overlying scale.  - No other lesions of concern on areas examined.     Impression/Plan:    1. Dermatitis, bilateral lower extremities, upper extremities. Prior biopsy in 2012 with non-specific findings. Prior managed as nummular dermatitis  largely with topical steroids. Recent flare with unknown trigger. Patient has had excellent response to oral prednisone, though we discussed this is not a sustainable long-term treatment strategy for his dermatitis. Recommended biopsy to evaluate for etiology. Discussed possible systemic treatment options including nbUVB, methotrexate, apremilast, and dupixent if his biopsy as consistent with a nummular or eczematous dermatitis.       Biopsy at below    Continue triam 0.1% ointment BID for arms and legs    Continue clobetasol 0.05% ointment BID PRN for any problem areas; can perform under saran wrap occlusion    Consider dupixent if c/w nummular dermatitis / atopic dermatitis / lichen simplex chronicus    PROCEDURE NOTE  Punch biopsy:  After discussion of benefits and risks including but not limited to bleeding/bruising, pain/swelling, infection, scar, incomplete removal, nerve damage/numbness, recurrence, and non-diagnostic biopsy, written consent, verbal consent and photographs were obtained. Time-out was performed. The area was cleaned with isopropyl alcohol. 0.5mL of 1% lidocaine with epinephrine was injected to obtain adequate anesthesia of the lesion on the right forearm. A 4 mm punch biopsy was performed.  4-0 prolene sutures were utilized to approximate the epidermal edges.  White petroleum jelly/VaselineTM and a bandage was applied to the wound.  Explicit verbal and written wound care instructions were provided.  The patient left the Dermatology Clinic in good condition. The patient was counseled to follow up for suture removal in approximately 14 days. Patient to remove sutures at home.     CC Krish Geronimo, CNP  [address] [place] [address] on close of this encounter.    Follow-up PRN pending biopsy result.     Staff Involved:  Staff Only    Gaston Spivey MD    Department of Dermatology  [facility]: Phone:  [phone], Fax:[phone]  [facility]: Phone: [phone], Fax: [phone]

## 2019-11-04 ENCOUNTER — TELEPHONE (OUTPATIENT)
Dept: DERMATOLOGY | Facility: CLINIC | Age: 44
End: 2019-11-04

## 2019-11-04 DIAGNOSIS — L20.9 ATOPIC DERMATITIS, UNSPECIFIED TYPE: Primary | ICD-10-CM

## 2019-11-04 DIAGNOSIS — L20.84 INTRINSIC ATOPIC DERMATITIS: ICD-10-CM

## 2019-11-04 LAB — COPATH REPORT: NORMAL

## 2019-11-04 NOTE — RESULT ENCOUNTER NOTE
Could you call patient and let him know biopsy was consistent with atopic dermatitis / eczema.     I sent a prescription to our specialty [place] pharmacy for dupixent. We will have to work on getting insurance approval for this, otherwise would continue topical therapies.     We should arrange to have him follow-up about 8 weeks after starting dupixent if it is approved.     Gaston Spivey MD    Department of Dermatology  [facility]: Phone: [phone], Fax:[phone]  [facility]: Phone: [phone], Fax: [phone]

## 2019-11-04 NOTE — RESULT ENCOUNTER NOTE
Could you call patient and let him know plan?    The biopsy was consistent with atopic dermatitis / eczema.     I will send a prescription to the [facility] for dupixent, the injectable medication we discussed at our visit.     We will have to work with insurance to get this approved. In meantime, continue topical steroids. Would make an appointment with me in 8 weeks after his dupixent is approved as follow-up.     Gaston Spivey MD    Department of Dermatology  [facility]: Phone: [phone], Fax:[phone]  [facility]: Phone: [phone], Fax: [phone]

## 2019-11-04 NOTE — TELEPHONE ENCOUNTER
PA Initiation    Medication: Dupixent 300mg/ 2mL syringes (Loading & maintenance)  Insurance Company: [employer] - Phone [phone] Fax [phone]  Pharmacy Filling the Rx: [facility] - [place], [place] - [address]  Filling Pharmacy Phone: [phone]  Filling Pharmacy Fax:    Start Date: 11/4/2019

## 2019-11-05 ENCOUNTER — HEALTH MAINTENANCE LETTER (OUTPATIENT)
Age: 44
End: 2019-11-05

## 2019-11-05 NOTE — TELEPHONE ENCOUNTER
Pt has 1st order Dupixent set up for 11/7 delivery from [facility].    Clinic attempting to set up injection training.

## 2019-11-05 NOTE — TELEPHONE ENCOUNTER
I called and left Antonio Flanagan asking for him to give us a call back to schedule for injection training and also a 8 week follow up appointment

## 2019-11-05 NOTE — TELEPHONE ENCOUNTER
Prior Authorization Approval    Authorization Effective Date: 11/4/2019  Authorization Expiration Date: 5/2/2020  Medication: Dupixent 300mg/ 2mL syringes   Approved Dose/Quantity: Loading & maintenance  Reference #: [facility] key# [id number]   Insurance Company: [employer] - Phone [phone] Fax [phone]  Expected CoPay: $0 with copay card ($922 without)     CoPay Card Available: Yes    Foundation Assistance Needed:    Which Pharmacy is filling the prescription (Not needed for infusion/clinic administered): [facility] - [place], [place] - [address]  Pharmacy Notified: Yes  Patient Notified:

## 2019-12-03 ENCOUNTER — MYC REFILL (OUTPATIENT)
Dept: DERMATOLOGY | Facility: CLINIC | Age: 44
End: 2019-12-03

## 2019-12-03 DIAGNOSIS — L20.84 INTRINSIC ATOPIC DERMATITIS: ICD-10-CM

## 2020-02-04 ENCOUNTER — OFFICE VISIT (OUTPATIENT)
Dept: FAMILY MEDICINE | Facility: CLINIC | Age: 45
End: 2020-02-04
Payer: COMMERCIAL

## 2020-02-04 VITALS
TEMPERATURE: 98.1 F | HEART RATE: 106 BPM | BODY MASS INDEX: 27.47 KG/M2 | HEIGHT: 69 IN | WEIGHT: 185.5 LBS | DIASTOLIC BLOOD PRESSURE: 78 MMHG | SYSTOLIC BLOOD PRESSURE: 126 MMHG | OXYGEN SATURATION: 98 %

## 2020-02-04 DIAGNOSIS — Z87.442 HISTORY OF RENAL STONE: ICD-10-CM

## 2020-02-04 DIAGNOSIS — R39.9 UTI SYMPTOMS: Primary | ICD-10-CM

## 2020-02-04 LAB
ALBUMIN UR-MCNC: NEGATIVE MG/DL
APPEARANCE UR: CLEAR
BACTERIA #/AREA URNS HPF: ABNORMAL /HPF
BILIRUB UR QL STRIP: NEGATIVE
COLOR UR AUTO: YELLOW
GLUCOSE UR STRIP-MCNC: NEGATIVE MG/DL
HGB UR QL STRIP: ABNORMAL
KETONES UR STRIP-MCNC: NEGATIVE MG/DL
LEUKOCYTE ESTERASE UR QL STRIP: NEGATIVE
NITRATE UR QL: NEGATIVE
PH UR STRIP: 7 PH (ref 5–7)
RBC #/AREA URNS AUTO: ABNORMAL /HPF
SOURCE: ABNORMAL
SP GR UR STRIP: 1.02 (ref 1–1.03)
UROBILINOGEN UR STRIP-ACNC: 0.2 EU/DL (ref 0.2–1)
WBC #/AREA URNS AUTO: ABNORMAL /HPF

## 2020-02-04 PROCEDURE — 87086 URINE CULTURE/COLONY COUNT: CPT | Performed by: FAMILY MEDICINE

## 2020-02-04 PROCEDURE — 99203 OFFICE O/P NEW LOW 30 MIN: CPT | Performed by: FAMILY MEDICINE

## 2020-02-04 PROCEDURE — 81001 URINALYSIS AUTO W/SCOPE: CPT | Performed by: FAMILY MEDICINE

## 2020-02-04 RX ORDER — CIPROFLOXACIN 250 MG/1
250 TABLET, FILM COATED ORAL 2 TIMES DAILY
Qty: 14 TABLET | Refills: 0 | Status: SHIPPED | OUTPATIENT
Start: 2020-02-04 | End: 2020-02-11

## 2020-02-04 ASSESSMENT — MIFFLIN-ST. JEOR: SCORE: 1726.8

## 2020-02-04 NOTE — PATIENT INSTRUCTIONS
- tylenol or ibuprofen every 6 to 8 hours as needed for pain   - ciprofloxacin (CIPRO) 250 MG tablet; Take 1 tablet (250 mg) by mouth 2 times daily for 7 days  Dispense: 14 tablet; Refill: 0  - Urine Culture pending  - Follow if symptoms worsen or fail to improve.

## 2020-02-04 NOTE — PROGRESS NOTES
"Subjective     Antonio Burger is a 44 year old male who presents to clinic today for the following health issues:    HPI   Genitourinary - Male  Onset: 3-4 days    Description:   Dysuria (painful urination): no   Hematuria (blood in urine): no   Frequency: YES  Are you urinating at night : YES  Hesitancy (delay in urine): YES  Retention (unable to empty): YES  Decrease in urinary flow: YES  Incontinence: no     Progression of Symptoms:  worsening    Accompanying Signs & Symptoms:  Fever: no   Back/Flank pain: no   Urethral discharge: no   Testicle lumps/masses/pain: no   Nausea and/or vomiting: no   Abdominal pain: yes, suprapubic pain     History:   History of frequent UTI's: no   History of kidney stones: YES  History of hernias: no   Personal or Family history of Prostate problems: no  Sexually active: no    Precipitating factors:   Nothing     Alleviating factors:  Nothing   No concern for STD's.         Reviewed and updated as needed this visit by Provider         Review of Systems   ROS COMP: Constitutional, HEENT, cardiovascular, pulmonary, gi and gu systems are negative, except as otherwise noted.      Objective    There were no vitals taken for this visit.  There is no height or weight on file to calculate BMI.  Physical Exam   /78 (BP Location: Left arm, Patient Position: Sitting, Cuff Size: Adult Large)   Pulse 106   Temp 98.1  F (36.7  C) (Oral)   Ht 1.753 m (5' 9\")   Wt 84.1 kg (185 lb 8 oz)   SpO2 98%   BMI 27.39 kg/m    GENERAL: healthy, alert and no distress  EYES: Eyes grossly normal to inspection  HENT:  nose and mouth without ulcers or lesions  ABDOMEN: soft, + suprapubic tender, no hepatosplenomegaly, no masses   RECTAL (male): prostate normal size, smooth, nontender without nodules or masses    Diagnostic Test Results:  Labs reviewed in Epic  Results for orders placed or performed in visit on 02/04/20 (from the past 24 hour(s))   *UA reflex to Microscopic and Culture ([place] and " [facility] (except [place] and [place])   Result Value Ref [place]    Color Urine Yellow     Appearance Urine Clear     Glucose Urine Negative NEG^Negative mg/dL    Bilirubin Urine Negative NEG^Negative    Ketones Urine Negative NEG^Negative mg/dL    Specific Gravity Urine 1.020 1.003 - 1.035    Blood Urine Moderate (A) NEG^Negative    pH Urine 7.0 5.0 - 7.0 pH    Protein Albumin Urine Negative NEG^Negative mg/dL    Urobilinogen Urine 0.2 0.2 - 1.0 EU/dL    Nitrite Urine Negative NEG^Negative    Leukocyte Esterase Urine Negative NEG^Negative    Source Midstream Urine    Urine Microscopic   Result Value Ref [place]    WBC Urine 0 - 5 OTO5^0 - 5 /HPF    RBC Urine 2-5 (A) OTO2^O - 2 /HPF    Bacteria Urine Few (A) NEG^Negative /HPF           Assessment & Plan     UTI symptoms  - H/o kidney stone symptoms are not c/w nephrolithiasis.  in a monogamous relationship, no concern for STD. Unlikely prostatitis, normal prostate exam. UA showed no LE or nitrites. Based on symptoms, I will tx with Cipro BID X 7 days. Pending UC.   - *UA reflex to Microscopic and Culture ([place] and [facility] (except [place] and [place])  - ciprofloxacin (CIPRO) 250 MG tablet; Take 1 tablet (250 mg) by mouth 2 times daily for 7 days  Dispense: 14 tablet; Refill: 0  - Urine Culture Aerobic Bacterial  - Urine Microscopic    Return in about 1 week (around 2/11/2020) for sympotms are not improving.    Judi Kirkland MD  [facility]

## 2020-02-05 LAB
BACTERIA SPEC CULT: NO GROWTH
SPECIMEN SOURCE: NORMAL

## 2020-04-23 DIAGNOSIS — L20.84 INTRINSIC ATOPIC DERMATITIS: ICD-10-CM

## 2020-04-24 RX ORDER — DUPILUMAB 300 MG/2ML
300 INJECTION, SOLUTION SUBCUTANEOUS
Qty: 2 ML | Refills: 6 | OUTPATIENT
Start: 2020-04-24

## 2020-04-24 NOTE — TELEPHONE ENCOUNTER
Received refill request for dupilumab as the resident on call. Reviewed patient's chart and attached communication. Patient last seen 10/31/2019 for atopic dermatitis. RTC not yet scheduled. Reviewed medication list and assessment and plan from last visit. The refill request is declined until patient schedules follow-up appointment.    The patient's information will be forwarded to the scheduling pool for return visit as planned at prior visit.     Telephone Encounter by Dorothy Stanford RN, BSN at 06/07/17 10:45 AM     Author:  Dorothy Stanford RN, BSN Service:  (none) Author Type:  Registered Nurse     Filed:  06/07/17 10:46 AM Encounter Date:  6/7/2017 Status:  Signed     :  Dorothy Stanford RN, BSN (Registered Nurse)            Dr. Arriola, did you receive any disability paperwork on this patient?[LH1.1M]      Revision History        User Key Date/Time User Provider Type Action    > LH1.1 06/07/17 10:46 AM Dorothy Stanford, RN, BSN Registered Nurse Sign    M - Manual

## 2020-04-24 NOTE — TELEPHONE ENCOUNTER
Dupilumab (DUPIXENT) 300 MG/2ML syringe     Route: Inject 2 mLs (300 mg) Subcutaneous every 14 days   Last Written Prescription Date:  1/31/2020  Last Fill Quantity: 2 ml,   # refills: 6 rfl  Last Office Visit : 10/31/2019  Future Office visit:  None    Routing refill request to provider for review/approval because:  Drug not on the [facility], P or [facility] refill protocol, no appt in place.  Scheduling has been notified to contact the pt for appointment.

## 2020-04-24 NOTE — TELEPHONE ENCOUNTER
Called patient to set up an appointment to get Dupixent refill. No answer, left message and call back number. Will send [employer] message out.

## 2020-04-27 RX ORDER — DUPILUMAB 300 MG/2ML
300 INJECTION, SOLUTION SUBCUTANEOUS
Qty: 4 ML | Refills: 0 | Status: SHIPPED | OUTPATIENT
Start: 2020-04-27 | End: 2020-04-30

## 2020-04-30 ENCOUNTER — VIRTUAL VISIT (OUTPATIENT)
Dept: DERMATOLOGY | Facility: CLINIC | Age: 45
End: 2020-04-30
Payer: COMMERCIAL

## 2020-04-30 DIAGNOSIS — L20.84 INTRINSIC ATOPIC DERMATITIS: ICD-10-CM

## 2020-04-30 RX ORDER — DUPILUMAB 300 MG/2ML
300 INJECTION, SOLUTION SUBCUTANEOUS
Qty: 4 ML | Refills: 10 | Status: SHIPPED | OUTPATIENT
Start: 2020-04-30 | End: 2021-03-30

## 2020-04-30 NOTE — NURSING NOTE
Chief Complaint   Patient presents with     Aropic Dermatitis     Antonio has an appt today to folow up on Atopic Dermatitis and Dupixent. Antonio notes he is doing well and has no current flares or concerns.      Ely Bustillo LPN

## 2020-04-30 NOTE — PROGRESS NOTES
[facility]  Store and Forward and Telephone    Impression and Recommendations (Patient Counseled on the Following):    1. Nummular dermatitis / atopic dermatitis. Significantly improved on dupixent with no significant side effects.   - Continue dupixent 300 mg q2 weeks; 1-year refill provided  - Follow-up in 1 yaer    2. Suspected lipoma, posterior neck. Advised patient we are not performing elective surgical procedures at this time due to the COVID-19 outbreak, but can call PRN July or after PRN to discuss surgical excision.     Follow-up:   Follow-up with dermatology in approximately 1 year. Earlier for new or changing lesions or rash.      Staff only:    Gaston Spivey MD  Pronouns: he/him/his    Department of Dermatology  [facility]: Phone: [phone], Fax:[phone]  [facility]: Phone: [phone] Fax: [phone]    _____________________________________________________________________________    Dermatology Problem List:  1. Nummular dermatitis / atopic dermatitis, bilateral lower extremities, upper extremities, hands. S/p bx 10/19.   - dupixent started 10/19 with near complete resolution  - triam 0.1% ointment /  clobetasol 0.05% ointment BID PRN for legs / arms  - desonide cream BID PRN for face  - prior tx: oral prednisone    Encounter Date: Apr 30, 2020    CC:   Chief Complaint   Patient presents with     Aropic Dermatitis     Antonio has an appt today to folow up on Atopic Dermatitis and Dupixent. Antonio notes he is doing well and has no current flares or concerns.        History of Present Illness:  I have reviewed the teledermatology information and the nursing intake corresponding to this issue. Antonio Burger is a 45 year old male who presents via teledermatology for nummular dermatitis / atopic dermatitis.    He reports he is much improved from prior  "with virtually no areas of eczema remaining. He does get occasional new spots on the dorsal feet or hands that resolve with moisturizer. He continues dupixent injections every 2 weeks. No local injection site reaction. No ocular symptoms. He has not needed to use topical steroids.    Of note, patient also reports a \"fatty tumor\" on his posterior neck. Reports asymptomatic, though he would like this potentially removed for cosmetic reasons. Non-urgent.     Health otherwise stable. No other skin concerns.     ROS: Patient is generally feeling well today.    Physical Examination:  General: Well-appearing male, appropriately-developed individual.  Skin: Focused examination within the teledermatology photograph(s) including upper and lower extremities was performed.   - Mild xerosis cutis  - Hyperpigmented, non-scaly macules on shins  - No other lesions of concern on areas examined.     Labs:  None.     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 Started at 16 years     Past Surgical History:   Procedure Laterality Date     NO HISTORY OF SURGERY         Social History:  Patient reports that he quit smoking about 8 years ago. His smoking use included cigarettes. He has a 13.00 pack-year smoking history. He has never used smokeless tobacco. He reports current alcohol use. He reports that he does not use drugs.    Family History:  Family History   Problem Relation Age of Onset     Diabetes Mother         type 2     Hypertension Mother      Depression Mother      Gastrointestinal Disease Father      "    reflux     Cancer Paternal Grandmother         ?     Cancer Maternal Grandmother         stomach       Medications:  Current Outpatient Medications   Medication     amphetamine-dextroamphetamine (ADDERALL) 10 MG tablet     clobetasol (TEMOVATE) 0.05 % external ointment     Dupilumab (DUPIXENT) 300 MG/2ML syringe     Methylphenidate HCl (RITALIN PO)     triamcinolone (KENALOG) 0.1 % external ointment     clobetasol (TEMOVATE) 0.05 % external ointment     desonide (DESOWEN) 0.05 % cream     hydrOXYzine (VISTARIL) 25 MG capsule     sildenafil (VIAGRA) 100 MG tablet     triamcinolone (KENALOG) 0.1 % external ointment     varenicline (CHANTIX STARTING MONTH PAK) 0.5 MG X 11 & 1 MG X 42 tablet     No current facility-administered medications for this visit.           Allergies   Allergen Reactions     No Known Drug Allergies      _____________________________________________________________________________    Teledermatology information:  - Location of patient: Home  - Patient presented as: return  - Location of teledermatologist:  ([facility] DERMATOLOGY )  - Reason teledermatology is appropriate:  of National Emergency Regarding Coronavirus disease (COVID 19) Outbreak  - Image quality and interpretability: acceptable  - Physician has received verbal consent for a Video/Photos Visit from the patient? Yes  - In-person dermatology visit recommendation: no  - Date of images: 4/30/2020  - Service start time: 11:30 AM  - Service end time: 11:42 AM  - Date of report: 4/30/2020

## 2020-04-30 NOTE — PATIENT INSTRUCTIONS
[facility] Teledermatology Visit    Thank you for allowing us to participate in your care. Your findings, instructions and follow-up plan are as follows:    Eczema  1. Continue dupixent injections every 2 weeks. Follow-up in 1 year    Suspected lipoma (fatty tumor).   1. May be restarting elective surgical procedures in July timeframe. Please call the clinic to schedule an earlier follow-up so we can see the spot in-person and discuss options for removal.     When should I call my doctor?    If you are worsening or not improving, please, contact us or seek urgent care as noted below.     Who should I call with questions (adults)?    [facility] (adult and pediatric): [phone]     [facility] (adult): [phone]    For urgent needs outside of business hours call the [facility] at [phone] and ask for the dermatology resident on call    If this is a medical emergency and you are unable to reach an ER, Call [phone]      Who should I call with questions (pediatric)?  [facility]- Pediatric Dermatology  Dr. Paty Grant, Dr. Beti Conrad, Dr. Gracie Robins, Kenzie Gotti, PA  Dr. Emerita Quan, Dr. Maria Antonia Long & Dr. Philippe Muniz  Non Urgent  Nurse Triage Line; [phone]- Adelita and Shawnee HERNANDEZ Care Coordinators   Velma (/Complex ) [phone]    If you need a prescription refill, please contact your pharmacy. Refills are approved or denied by our Physicians during normal business hours, Monday through Fridays  Per office policy, refills will not be granted if you have not been seen within the past year (or sooner depending on your child's condition)    Scheduling Information:  Pediatric Appointment Scheduling and Call Center [phone]  Radiology Scheduling- [phone]  Sedation Unit Scheduling- [phone]  [place] Scheduling- General [phone];  Pediatric Dermatology [phone]  Main  Services: [phone]  Kyrgyz: [phone]  Montserratian: [phone]  Hmong/Turkmen/Pashto: [phone]  Preadmission Nursing Department Fax Number: [phone] (Fax all pre-operative paperwork to this number)    For urgent matters arising during evenings, weekends, or holidays that cannot wait for normal business hours please call [phone] and ask for the Dermatology Resident On-Call to be paged.

## 2020-05-20 ENCOUNTER — TELEPHONE (OUTPATIENT)
Dept: DERMATOLOGY | Facility: CLINIC | Age: 45
End: 2020-05-20

## 2020-05-27 NOTE — TELEPHONE ENCOUNTER
Prior Authorization Approval    Authorization Effective Date: 5/20/2020  Authorization Expiration Date: 5/20/2021  Medication: Dupixent  Approved Dose/Quantity: ud  Reference #: [id number]   Insurance Company: [employer] - Phone [phone] Fax [phone]  Expected CoPay: $0     CoPay Card Available: Yes    Foundation Assistance Needed:    Which Pharmacy is filling the prescription (Not needed for infusion/clinic administered): [facility] - [place], [place] - [address]  Pharmacy Notified: Yes  Patient Notified: Yes

## 2020-11-22 ENCOUNTER — HEALTH MAINTENANCE LETTER (OUTPATIENT)
Age: 45
End: 2020-11-22

## 2021-03-25 DIAGNOSIS — L20.84 INTRINSIC ATOPIC DERMATITIS: ICD-10-CM

## 2021-03-30 ENCOUNTER — TELEPHONE (OUTPATIENT)
Dept: DERMATOLOGY | Facility: CLINIC | Age: 46
End: 2021-03-30

## 2021-03-30 DIAGNOSIS — L20.84 INTRINSIC ATOPIC DERMATITIS: ICD-10-CM

## 2021-03-30 RX ORDER — DUPILUMAB 300 MG/2ML
300 INJECTION, SOLUTION SUBCUTANEOUS
Qty: 4 ML | Refills: 11 | Status: SHIPPED | OUTPATIENT
Start: 2021-03-30 | End: 2021-04-01

## 2021-03-30 NOTE — TELEPHONE ENCOUNTER
Dupilumab (DUPIXENT) 300 MG/2ML syringe    Inject 2 mLs (300 mg) Subcutaneous every 14 days -     Last Written Prescription Date:  4/30/20  Last Fill Quantity: 4 ml,   # refills: 10  Last Office Visit : 4/30/20  Future Office visit:  4/1/21    Routing refill request to provider for review/approval because:  Drug not on the [facility], [facility] or [facility] refill protocol or controlled substance

## 2021-03-30 NOTE — TELEPHONE ENCOUNTER
[facility] Call Center    Phone Message    May a detailed message be left on voicemail: yes     Reason for Call: Medication Refill Request    Has the patient contacted the pharmacy for the refill? Yes   Name of medication being requested: Dupilumab (DUPIXENT) 300 MG/2ML syringe  Provider who prescribed the medication: Dr. Spivey  Pharmacy: [facility]  Date medication is needed: 03/30/21   Pharmacy needs Rx released today, so they can mail Rx to Pt. Thank you       Action Taken: Message routed to:  [facility] Derm    Travel Screening: Not Applicable

## 2021-03-30 NOTE — TELEPHONE ENCOUNTER
Received refill request for dupixent as the resident on call. Reviewed patient's chart and attached communication. Patient last seen 4/30/20 for nummular dermatitis/AD. RTC scheduled 4/1/21. After reviewing the medication list and assessment and plan from last visit, the refill request was accepted.    CC'ing Dr. Spivey as BRIDGET Mckeon  PGY-3 Dermatology Resident  Pager: [phone]

## 2021-04-01 ENCOUNTER — VIRTUAL VISIT (OUTPATIENT)
Dept: DERMATOLOGY | Facility: CLINIC | Age: 46
End: 2021-04-01
Payer: COMMERCIAL

## 2021-04-01 DIAGNOSIS — D17.0 LIPOMA OF NECK: Primary | ICD-10-CM

## 2021-04-01 DIAGNOSIS — L20.84 INTRINSIC ATOPIC DERMATITIS: ICD-10-CM

## 2021-04-01 PROCEDURE — 99214 OFFICE O/P EST MOD 30 MIN: CPT | Mod: 95 | Performed by: DERMATOLOGY

## 2021-04-01 RX ORDER — DUPILUMAB 300 MG/2ML
300 INJECTION, SOLUTION SUBCUTANEOUS
Qty: 4 ML | Refills: 11 | Status: SHIPPED | OUTPATIENT
Start: 2021-04-01 | End: 2022-04-27

## 2021-04-01 NOTE — PROGRESS NOTES
[facility] Dermatology Note  Encounter Date: Apr 1, 2021  Telephone ([phone]). Location of teledermatologist: [facility] [place]. Start time: 5:17 PM. End time: 5:21 PM.    Dermatology Problem List:  1. Nummular dermatitis / atopic dermatitis, bilateral lower extremities, upper extremities, hands. S/p bx 10/19.   - dupixent started 10/19 with near complete resolution  - triam 0.1% ointment /  clobetasol 0.05% ointment BID PRN for legs / arms  - desonide cream BID PRN for face  - prior tx: oral prednisone  ____________________________________________    Assessment & Plan:     #  Nummular/atopic dermatitis. Very well controlled on dupixent with minimal side effects.   - Continue dupixent 300 mg q2 weeks; 1-year refill provided  - Can use triam 0.1% ointment BID PRN flares; patient deferred refill today but will request if needed.  - Diligent moisturization     # Subcutaneous nodule, posterior neck. Suspect lipoma. Referral to derm surg placed.     Procedures Performed:    None    Follow-up: 1 year(s) in-person, or earlier for new or changing lesions    Staff:     Gaston Spivey MD  Pronouns: he/him/his    Department of Dermatology  [facility]: Phone: [phone], Fax:[phone]  [facility]: Phone: [phone] Fax: [phone]    ____________________________________________    CC: Derm Problem (Antonio would like to discuss a lump on the back of his neck that will not go away. He further reports no concerns with the dermatitis, as things have seemed to be going well ever since prescribed Dupixent. )    HPI:  Mr. Antonio Burger is a(n) 46 year old male who presents today as a return patient for follow-up nummular dermatitis on dupixent and for evaluation of a spot on his posterior neck. Last seen 4/30/2020 when continued on dupixent.  "    Today, he reports his eczema is overall very well controlled on dupixent. He denies any side effects including injection site reactions or eye issues. He is only occasionally using triam 0.1% ointment BID PRN flares.     He also reports a continued \"lump\" on his posterior neck. Had prior discussed removal of this though deferred due to COVID. He would like to pursue this.    Patient is otherwise feeling well, without additional skin concerns.    Labs Reviewed:  N/A    Physical Exam:  Vitals: There were no vitals taken for this visit.  SKIN: Teledermatology photos were reviewed; image quality and interpretability: acceptable. Image date: 4/1/21.  - Skin-colored subcutaneous nodule on the posterior neck.   - No other lesions of concern on areas examined.                  Medications:  Current Outpatient Medications   Medication     amphetamine-dextroamphetamine (ADDERALL) 10 MG tablet     clobetasol (TEMOVATE) 0.05 % external ointment     Dupilumab (DUPIXENT) 300 MG/2ML syringe     clobetasol (TEMOVATE) 0.05 % external ointment     desonide (DESOWEN) 0.05 % cream     hydrOXYzine (VISTARIL) 25 MG capsule     Methylphenidate HCl (RITALIN PO)     sildenafil (VIAGRA) 100 MG tablet     triamcinolone (KENALOG) 0.1 % external ointment     triamcinolone (KENALOG) 0.1 % external ointment     varenicline (CHANTIX STARTING MONTH PAK) 0.5 MG X 11 & 1 MG X 42 tablet     No current facility-administered medications for this visit.       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 Started at 16 " years

## 2021-04-01 NOTE — LETTER
4/1/2021       RE: Antonio Burger  [address]  [place] [address]     Dear Colleague,    Thank you for referring your patient, Antonio Burger, to the [facility] DERMAT[facility] [place] at [facility]. Please see a copy of my visit note below.    [facility] Dermatology Note  Encounter Date: Apr 1, 2021  Telephone ([phone]). Location of teledermatologist: [facility] D[facility]MATOLOG[facility]IC [place]. Start time: 5:17 PM. End time: 5:21 PM.    Dermatology Problem List:  1. Nummular dermatitis / atopic dermatitis, bilateral lower extremities, upper extremities, hands. S/p bx 10/19.   - dupixent started 10/19 with near complete resolution  - triam 0.1% ointment /  clobetasol 0.05% ointment BID PRN for legs / arms  - desonide cream BID PRN for face  - prior tx: oral prednisone  ____________________________________________    Assessment & Plan:     #  Nummular/atopic dermatitis. Very well controlled on dupixent with minimal side effects.   - Continue dupixent 300 mg q2 weeks; 1-year refill provided  - Can use triam 0.1% ointment BID PRN flares; patient deferred refill today but will request if needed.  - Diligent moisturization     # Subcutaneous nodule, posterior neck. Suspect lipoma. Referral to derm surg placed.     Procedures Performed:    None    Follow-up: 1 year(s) in-person, or earlier for new or changing lesions    Staff:     Gaston Spivey MD  Pronouns: he/him/his    Department of Dermatology  [facility]: Phone: [phone], Fax:[phone]  [facility]: Phone: [phone] Fax: [phone]    ____________________________________________    CC: Derm Problem (Antonio would like to discuss a lump on the back of his neck that will not go away. He further reports no concerns with the dermatitis, as things  "have seemed to be going well ever since prescribed Dupixent. )    HPI:  Mr. Antonio Burger is a(n) 46 year old male who presents today as a return patient for follow-up nummular dermatitis on dupixent and for evaluation of a spot on his posterior neck. Last seen 4/30/2020 when continued on dupixent.     Today, he reports his eczema is overall very well controlled on dupixent. He denies any side effects including injection site reactions or eye issues. He is only occasionally using triam 0.1% ointment BID PRN flares.     He also reports a continued \"lump\" on his posterior neck. Had prior discussed removal of this though deferred due to COVID. He would like to pursue this.    Patient is otherwise feeling well, without additional skin concerns.    Labs Reviewed:  N/A    Physical Exam:  Vitals: There were no vitals taken for this visit.  SKIN: Teledermatology photos were reviewed; image quality and interpretability: acceptable. Image date: 4/1/21.  - Skin-colored subcutaneous nodule on the posterior neck.   - No other lesions of concern on areas examined.                  Medications:  Current Outpatient Medications   Medication     amphetamine-dextroamphetamine (ADDERALL) 10 MG tablet     clobetasol (TEMOVATE) 0.05 % external ointment     Dupilumab (DUPIXENT) 300 MG/2ML syringe     clobetasol (TEMOVATE) 0.05 % external ointment     desonide (DESOWEN) 0.05 % cream     hydrOXYzine (VISTARIL) 25 MG capsule     Methylphenidate HCl (RITALIN PO)     sildenafil (VIAGRA) 100 MG tablet     triamcinolone (KENALOG) 0.1 % external ointment     triamcinolone (KENALOG) 0.1 % external ointment     varenicline (CHANTIX STARTING MONTH PAK) 0.5 MG X 11 & 1 MG X 42 tablet     No current facility-administered medications for this visit.       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 Started at 16 years

## 2021-04-01 NOTE — NURSING NOTE
Dermatology Rooming Note    Antonio Burger's goals for this visit include:   Chief Complaint   Patient presents with     Derm Problem     Antonio would like to discuss a lump on the back of his neck that will not go away. He further reports no concerns with the dermatitis, as things have seemed to be going well ever since prescribed Dupixent.      Nathanael Bosch, EMT

## 2021-04-01 NOTE — PATIENT INSTRUCTIONS
[facility] Dermatology Visit    Thank you for allowing us to participate in your care. Your findings, instructions and follow-up plan are as follows:     Atopic dermatitis/eczema.   1. Continue dupixent.     Nodule on back of neck.   1. Referral to dermatology surgery placed. You should receive a call in the next 1-2 weeks to schedule this procedure.     Otherwise, follow-up in 1 year.     When should I call my doctor?    If you are worsening or not improving, please, contact us or seek urgent care as noted below.     Who should I call with questions (adults)?    [facility] (adult and pediatric): [phone]     [facility] (adult): [phone]    For urgent needs outside of business hours call the [facility] at [phone] and ask for the dermatology resident on call    If this is a medical emergency and you are unable to reach an ER, Call 911      Who should I call with questions (pediatric)?  [facility]- Pediatric Dermatology  Dr. Paty Grant, Dr. Beti Conrad, Dr. Gracie Robins, Kenzie Gotti, PA  Dr. Emerita Quan, Dr. Maria Antonia Long & Dr. Philippe Muniz  Non Urgent  Nurse Triage Line; [phone]- Adelita and Shawnee HERNANDEZ Care Coordinators   Velma (/Complex ) [phone]    If you need a prescription refill, please contact your pharmacy. Refills are approved or denied by our Physicians during normal business hours, Monday through Fridays  Per office policy, refills will not be granted if you have not been seen within the past year (or sooner depending on your child's condition)    Scheduling Information:  Pediatric Appointment Scheduling and Call Center [phone]  Radiology Scheduling- [phone]  Sedation Unit Scheduling- [phone]  [place] Scheduling- General [phone]; Pediatric Dermatology [phone]  Main  Services:  [phone]  Telugu: [phone]  Citizen of [place]: [phone]  Hmong/Faroese/Italian: [phone]  Preadmission Nursing Department Fax Number: [phone] (Fax all pre-operative paperwork to this number)    For urgent matters arising during evenings, weekends, or holidays that cannot wait for normal business hours please call [phone] and ask for the Dermatology Resident On-Call to be paged.

## 2021-05-20 ENCOUNTER — TELEPHONE (OUTPATIENT)
Dept: DERMATOLOGY | Facility: CLINIC | Age: 46
End: 2021-05-20

## 2021-05-20 NOTE — TELEPHONE ENCOUNTER
Called and left message for Antonio to call and schedule a Consult appointment with Dr. Sanabria for the suspected neck lipoma, next available.    Jim Maldonado, Visit Facilitator

## 2021-06-03 NOTE — TELEPHONE ENCOUNTER
FUTURE VISIT INFORMATION      FUTURE VISIT INFORMATION:    Date: 6.22.21    Time: 3:15    Location: Telephone  REFERRAL INFORMATION:    Referring provider:  Dr. Gaston Spivey    Referring providers clinic:  [facility]    Reason for visit/diagnosis  Suspected lipoma of posterior neck    RECORDS REQUESTED FROM:       Clinic name Comments Records Status Photos Status   [facility] 4.1.21  Dr. Spivey [facility]

## 2021-06-22 ENCOUNTER — PRE VISIT (OUTPATIENT)
Dept: DERMATOLOGY | Facility: CLINIC | Age: 46
End: 2021-06-22

## 2021-06-22 ENCOUNTER — VIRTUAL VISIT (OUTPATIENT)
Dept: DERMATOLOGY | Facility: CLINIC | Age: 46
End: 2021-06-22
Payer: COMMERCIAL

## 2021-06-22 DIAGNOSIS — D17.0 LIPOMA OF NECK: ICD-10-CM

## 2021-06-22 PROCEDURE — 99213 OFFICE O/P EST LOW 20 MIN: CPT | Mod: 95 | Performed by: DERMATOLOGY

## 2021-06-22 ASSESSMENT — PAIN SCALES - GENERAL: PAINLEVEL: NO PAIN (0)

## 2021-06-22 NOTE — LETTER
6/22/2021       RE: Antonio Burger  [address]  [place] [address]     Dear Colleague,    Thank you for referring your patient, Antonio Burger, to the [facility] DERMATOLOG[facility] SURGERY [facility]INIC [place] at [facility]. Please see a copy of my visit note below.    [facility] Dermatology Note  Encounter Date: Jun 22, 2021  Store-and-Forward and Telephone ([phone]). Location of teledermatologist: [facility] [place].  Start time: 1600. End time: 1610.    Dermatology Problem List:  1. Nummular dermatitis / atopic dermatitis, bilateral lower extremities, upper extremities, hands. S/p bx 10/19.   - dupixent started 10/19 with near complete resolution  - triam 0.1% ointment /  clobetasol 0.05% ointment BID PRN for legs / arms  - desonide cream BID PRN for face  - prior tx: oral prednisone  2. Suspected lipoma of the posterior neck  Will schedule for removal.     ____________________________________________    Assessment & Plan:     # Suspected lipoma of the posterior neck  - Discussed the nature of the diagnosis/condition  - Discussed the treatment options, including the risks benefits and expectations of these options.   - Will proceed with excision.     Current Anticoagulant(s): None  Pacemaker: No  Defibrillator: No  Prophylactic Antibiotic Needed: No        Procedures Performed:    None    Follow-up: excision within 2-3 months    Staff and Fellow:     Jesus Haynes MD  PGY-6    Micrographic Surgery and Dermatologic Oncology Fellow  June 22, 2021      ____________________________________________    CC: Derm Problem (lipoma posterior neck)    HPI:  Mr. Antonio Burger is a VERY pleasant 46 year old male who presents today as a new patient for consultation for excision of lipoma.    Present for many years, saw a general surgeon, who was not impressed. The patient finds it annoying and would like to have it  removed.     Patient is otherwise feeling well, without additional skin concerns.    Labs Reviewed:  N/A    Physical Exam:  Vitals: There were no vitals taken for this visit.  SKIN: Teledermatology photos were reviewed; image quality and interpretability: acceptable. Image date: 3/31/2021.  - Over the posterior neck is a skin colored, subcutaneous appearing nodule   - No other lesions of concern on areas examined.     Medications:  Current Outpatient Medications   Medication     amphetamine-dextroamphetamine (ADDERALL) 10 MG tablet     clobetasol (TEMOVATE) 0.05 % external ointment     Dupilumab (DUPIXENT) 300 MG/2ML syringe     Methylphenidate HCl (RITALIN PO)     triamcinolone (KENALOG) 0.1 % external ointment     clobetasol (TEMOVATE) 0.05 % external ointment     desonide (DESOWEN) 0.05 % cream     hydrOXYzine (VISTARIL) 25 MG capsule     sildenafil (VIAGRA) 100 MG tablet     triamcinolone (KENALOG) 0.1 % external ointment     varenicline (CHANTIX STARTING MONTH PAK) 0.5 MG X 11 & 1 MG X 42 tablet     No current facility-administered medications for this visit.       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 Started at 16 years       CC Gaston Spivey MD  [address] [address] on close of this encounter.    Attestation signed by Popeye Sanabria MD at 7/6/2021  9:10 AM:  Attending Attestation:  I personally interviewed and examined the patient.  The patient was discussed with the resident.  I reviewed the resident note and agree with  the findings.  Any edits are below.    History: sub q nodule on neck    PE: 5 cm subcutaneous nodule    A/P: Lipoma -- schedule excision if desired by patient.      Popeye Sanabria M.D.  Professor  Director of Dermatologic Surgery  Department of Dermatology

## 2021-06-22 NOTE — PROGRESS NOTES
[facility] Dermatology Note  Encounter Date: Jun 22, 2021  Store-and-Forward and Telephone ([phone]). Location of teledermatologist: [facility]MATOLOG[facility]ERY [facility] [place].  Start time: 1600. End time: 1610.    Dermatology Problem List:  1. Nummular dermatitis / atopic dermatitis, bilateral lower extremities, upper extremities, hands. S/p bx 10/19.   - dupixent started 10/19 with near complete resolution  - triam 0.1% ointment /  clobetasol 0.05% ointment BID PRN for legs / arms  - desonide cream BID PRN for face  - prior tx: oral prednisone  2. Suspected lipoma of the posterior neck  Will schedule for removal.     ____________________________________________    Assessment & Plan:     # Suspected lipoma of the posterior neck  - Discussed the nature of the diagnosis/condition  - Discussed the treatment options, including the risks benefits and expectations of these options.   - Will proceed with excision.     Current Anticoagulant(s): None  Pacemaker: No  Defibrillator: No  Prophylactic Antibiotic Needed: No        Procedures Performed:    None    Follow-up: excision within 2-3 months    Staff and Fellow:     Jesus Haynes MD  PGY-6    Micrographic Surgery and Dermatologic Oncology Fellow  June 22, 2021      ____________________________________________    CC: Derm Problem (lipoma posterior neck)    HPI:  Mr. Antonio Burger is a VERY pleasant 46 year old male who presents today as a new patient for consultation for excision of lipoma.    Present for many years, saw a general surgeon, who was not impressed. The patient finds it annoying and would like to have it removed.     Patient is otherwise feeling well, without additional skin concerns.    Labs Reviewed:  N/A    Physical Exam:  Vitals: There were no vitals taken for this visit.  SKIN: Teledermatology photos were reviewed; image quality and interpretability: acceptable. Image date: 3/31/2021.  - Over the posterior neck  is a skin colored, subcutaneous appearing nodule   - No other lesions of concern on areas examined.     Medications:  Current Outpatient Medications   Medication     amphetamine-dextroamphetamine (ADDERALL) 10 MG tablet     clobetasol (TEMOVATE) 0.05 % external ointment     Dupilumab (DUPIXENT) 300 MG/2ML syringe     Methylphenidate HCl (RITALIN PO)     triamcinolone (KENALOG) 0.1 % external ointment     clobetasol (TEMOVATE) 0.05 % external ointment     desonide (DESOWEN) 0.05 % cream     hydrOXYzine (VISTARIL) 25 MG capsule     sildenafil (VIAGRA) 100 MG tablet     triamcinolone (KENALOG) 0.1 % external ointment     varenicline (CHANTIX STARTING MONTH PAK) 0.5 MG X 11 & 1 MG X 42 tablet     No current facility-administered medications for this visit.       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 Started at 16 years       CC Gaston Spivey MD  [address] [address] on close of this encounter.

## 2021-06-22 NOTE — NURSING NOTE
Chief Complaint   Patient presents with     Derm Problem     lipoma posterior neck     Gracie Canela, EMT

## 2021-09-14 ENCOUNTER — MYC MEDICAL ADVICE (OUTPATIENT)
Dept: DERMATOLOGY | Facility: CLINIC | Age: 46
End: 2021-09-14

## 2021-09-14 DIAGNOSIS — L30.9 DERMATITIS: ICD-10-CM

## 2021-09-15 RX ORDER — CLOBETASOL PROPIONATE 0.5 MG/G
OINTMENT TOPICAL
Qty: 60 G | Refills: 5 | Status: SHIPPED | OUTPATIENT
Start: 2021-09-15

## 2021-09-19 ENCOUNTER — HEALTH MAINTENANCE LETTER (OUTPATIENT)
Age: 46
End: 2021-09-19

## 2021-10-18 ENCOUNTER — ANCILLARY PROCEDURE (OUTPATIENT)
Dept: GENERAL RADIOLOGY | Facility: CLINIC | Age: 46
End: 2021-10-18
Attending: FAMILY MEDICINE
Payer: COMMERCIAL

## 2021-10-18 ENCOUNTER — OFFICE VISIT (OUTPATIENT)
Dept: URGENT CARE | Facility: URGENT CARE | Age: 46
End: 2021-10-18
Payer: COMMERCIAL

## 2021-10-18 VITALS
WEIGHT: 175 LBS | HEIGHT: 68 IN | TEMPERATURE: 98 F | DIASTOLIC BLOOD PRESSURE: 80 MMHG | SYSTOLIC BLOOD PRESSURE: 124 MMHG | BODY MASS INDEX: 26.52 KG/M2 | RESPIRATION RATE: 15 BRPM | HEART RATE: 118 BPM | OXYGEN SATURATION: 98 %

## 2021-10-18 DIAGNOSIS — M54.41 ACUTE RIGHT-SIDED LOW BACK PAIN WITH RIGHT-SIDED SCIATICA: Primary | ICD-10-CM

## 2021-10-18 DIAGNOSIS — M54.41 ACUTE RIGHT-SIDED LOW BACK PAIN WITH RIGHT-SIDED SCIATICA: ICD-10-CM

## 2021-10-18 PROCEDURE — 72110 X-RAY EXAM L-2 SPINE 4/>VWS: CPT | Performed by: RADIOLOGY

## 2021-10-18 PROCEDURE — 99214 OFFICE O/P EST MOD 30 MIN: CPT | Performed by: FAMILY MEDICINE

## 2021-10-18 RX ORDER — CYCLOBENZAPRINE HCL 5 MG
5 TABLET ORAL
Qty: 30 TABLET | Refills: 0 | Status: SHIPPED | OUTPATIENT
Start: 2021-10-18 | End: 2023-02-17

## 2021-10-18 RX ORDER — METHYLPREDNISOLONE 4 MG
TABLET, DOSE PACK ORAL
Qty: 21 TABLET | Refills: 0 | Status: SHIPPED | OUTPATIENT
Start: 2021-10-18 | End: 2023-02-17

## 2021-10-18 ASSESSMENT — MIFFLIN-ST. JEOR: SCORE: 1653.29

## 2021-10-18 NOTE — PROGRESS NOTES
Patient presents with:  Urgent Care  Back Pain: Lifted bag on cement on Saturday, low back pain, hurts to even look down.        Subjective     Antonio Burger is a 46 year old male who presents to clinic today for the following health issues:    HPI     Back Pain       Duration: 2 days ago     Progressively gotten worse since onset 2 days ago        Specific cause: lifting cement bag-    Description:   Location of pain: low back right/toward center  Character of pain: sharp constant  Pain radiation:radiates into the right buttocks and to knee  New numbness or weakness in legs, not attributed to pain:  no     Intensity: Currently /10 standing at rest/sitting at rest if moves at all 10/10    History:   Pain interferes with job: No  History of back problems: no prior back problems  Any previous MRI or X-rays: None  Sees a specialist for back pain:  No  Therapies tried without relief: iibuprofen, icy hot/heating pad on it last night to try to sleep, salon pas patch minimal relief pain worse at night          Past Medical History:   Diagnosis Date     Anxiety      Depressive disorder      Lipoma of other specified sites  2007     neck area- plans observation     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 Started at 16 years     Social History     Tobacco Use     Smoking status: Former Smoker     Packs/day: 1.00     Years: 13.00     Pack years: 13.00     Types: Cigarettes     Quit date: 2011     Years since quittin.8     Smokeless tobacco: Never Used   Substance Use Topics     Alcohol use: Yes     Comment: occ       Current Outpatient Medications   Medication Sig Dispense Refill     acetaminophen-codeine (TYLENOL #3) 300-30 MG tablet Take 1 tablet by mouth every 6 hours as needed for severe pain 10 tablet 0     amphetamine-dextroamphetamine (ADDERALL) 10 MG tablet Take 10 mg by mouth 2 times daily       cyclobenzaprine (FLEXERIL)  "5 MG tablet Take 1 tablet (5 mg) by mouth at bedtime as needed, may repeat once for muscle spasms 30 tablet 0     Dupilumab (DUPIXENT) 300 MG/2ML syringe Inject 2 mLs (300 mg) Subcutaneous every 14 days 4 mL 11     methylPREDNISolone (MEDROL DOSEPAK) 4 MG tablet therapy pack Follow Package Directions 21 tablet 0     clobetasol (TEMOVATE) 0.05 % external ointment Apply twice daily as needed for itchy spots on legs. 60 g 5     clobetasol (TEMOVATE) 0.05 % external ointment Apply thin layer at night to affected area on backs of hands and legs. Cover legs with saran wrap. 80 g 3     desonide (DESOWEN) 0.05 % cream APPLY TWICE DAILY TO FACE IF ECZEMA IS FLARING 60 g 0     hydrOXYzine (VISTARIL) 25 MG capsule Take 1 capsule (25 mg) by mouth 4 times daily as needed for itching 60 capsule 1     Methylphenidate HCl (RITALIN PO) Take 10 mg by mouth 8 times daily.   (Patient not taking: Reported on 10/18/2021)       sildenafil (VIAGRA) 100 MG tablet Take 0.5-1 tablets ( mg) by mouth daily as needed for erectile dysfunction Take 30 min to 4 hours before intercourse.  Never use with nitroglycerin, terazosin or doxazosin. 6 tablet 11     triamcinolone (KENALOG) 0.1 % external ointment Apply twice daily to any itchy areas on hands, arms, or legs. 454 g 3     triamcinolone (KENALOG) 0.1 % external ointment Apply topically twice daily to affected areas as needed. 80 g 3     varenicline (CHANTIX STARTING MONTH PAK) 0.5 MG X 11 & 1 MG X 42 tablet Take 0.5 mg tab daily for 3 days, then 0.5 mg tab twice daily for 4 days, then 1 mg twice daily. 53 tablet 2     Allergies   Allergen Reactions     No Known Drug Allergies              ROS are negative, except as otherwise noted HPI      Objective    /80   Pulse 118   Temp 98  F (36.7  C)   Resp 15   Ht 1.727 m (5' 8\")   Wt 79.4 kg (175 lb)   SpO2 98%   BMI 26.61 kg/m    Body mass index is 26.61 kg/m .  Physical Exam   GENERAL: healthy, alert and no distress  MS: no gross " musculoskeletal defects noted, no edema  EXAM: MS: back exam: general movements in the exam room are impaired by pain in lower back , flexion pain, worse when he flexes his neck, extension pain worse than flexion , tenderness to palpitation over l3/l4 and l4/l5, nontender over the sciatic notch  straight leg raise right positive at 60 degrees , straight leg raise left negative , leg strength symmetrical and normal, heel and toe walk normal   NEURO: Normal strength and tone, mentation intact and speech normal  DTR's normal and symmetric at knees +2  and gait normal including heel/toe/tandem walking              Diagnostic Test Results:  Labs reviewed in Epic   XRAY-lumbar spine  Preliminary  reading  No fracture, no obvious disc space narrowing    as visualized by this MD  Kala Schmidt MD          ASSESSMENT/PLAN:      ICD-10-CM    1. Acute right-sided low back pain with right-sided sciatica  M54.41 XR Lumbar Spine G/E 4 Views     cyclobenzaprine (FLEXERIL) 5 MG tablet     methylPREDNISolone (MEDROL DOSEPAK) 4 MG tablet therapy pack     acetaminophen-codeine (TYLENOL #3) 300-30 MG tablet     Orthopedic  Referral             Reviewed medication instructions and side effects. Follow up if experiences side effects.     I reviewed supportive care, otc meds to use if needed, expected course, and signs of concern.  Follow up as needed or if he does not improve within  1-2 days or if worsens in any way.  Reviewed red flag symptoms and is to go to the ER if experiences any of these.     The use of Dragon/[employer] dictation services may have been used to construct the content in this note; any grammatical or spelling errors are non-intentional. Please contact the author of this note directly if you are in need of any clarification.        On the day of the encounter, time spend on chart review, patient visit, review of testing, documentation and discussion with other providers was 30  minutes        Patient  Instructions     Start Medrol Dosepak as directed will help with inflammation due to your acute back injury    Start cyclobenzaprine take 1 tablet at night if you wake up in pain in all night you can take a second dose this is a muscle relaxant which should help with pain    Start Tylenol with codeine can take it up to every 6 hours cannot drive on this medication would try to reserve this more for night since her pain is more severe at night    Addendum  Hand written on avs the following    For pain    Start  Ibuprofen (motrin/advil)  800 mg 3 times a day always take with food for the next 5 to 7 days until pain resolves-maximum dose of ibuprofen is 2400 mg in 24 hours     Can alternate with     Acetaminophen (Tylenol ) 1000 mg 3 times a day for the next 5 to 7 days until pain resolves-maximum dose of acetaminophen (tylenol)  is 3000 mg in 24-hours    Kala Schmidt MD        Patient Education     Understanding Lumbosacral Strain    Lumbosacral strain is a medical term for an injury that causes low back pain. The lumbosacral area (low back) is between the bottom of the ribcage and the top of the buttocks. A strain is tearing of muscles and tendons. These tears can be very small but still cause pain.   How a lumbosacral strain happens  Muscles and tendons connected to the spine can be strained in a number of ways:    Sitting or standing in the same position for long periods of time. This can harm the low back over time. Poor posture can make low back pain more likely.    Moving the muscles and tendons past their usual range of motion. This can cause a sudden injury. This can happen when you twist, bend over, or lift something heavy. Not using correct technique for sports or tasks like lifting can make back injury more likely.    Accidents or falls  Lumbosacral strain can be caused by other problems, but these are less common.  Symptoms of lumbosacral strain  Symptoms may include:    Pain in the back, often on one  side    Pain that gets worse with movement and gets better with rest    Inability to move as freely as usual    Swelling, slight redness, and skin warmth in the painful area  Treatment for lumbosacral strain  Low back pain often goes away by itself within several weeks. But it often comes back. Treatment focuses on reducing pain and avoiding further injury. Bed rest is usually not recommended for low back pain. Treatments may include:     Avoiding or changing the action that caused the problem. This helps prevent injuring the tissues again.    Prescription or over-the-counter medicines. These help reduce inflammation, swelling, and pain. NSAIDs (nonsteroidal anti-inflammatory drugs) are the most common medicines used. Medicines may be prescribed or bought over the counter. They may be given as pills. Or they may be put on the skin a gel, cream, or patch.    Cold or heat packs. These help reduce pain and swelling.    Stretching and other exercises.  These improve flexibility and strength.    Physical therapy. This usually includes exercises and other treatments.    Injections of medicine. This may relieve symptoms. The medicine is usually a corticosteroid. This is a strong anti-inflammatory medicine.  If these treatments don't relieve symptoms, your healthcare provider may order imaging tests to learn more about the problem. Sometimes you may need surgery.   Possible complications of lumbosacral strain  If the cause of the pain is not addressed, symptoms may return or get worse. Follow your healthcare provider s instructions on lifestyle changes and treating your back.   When to call your healthcare provider  Call your healthcare provider right away if you have any of these:    Fever of 100.4 F (38 C) or higher, or as directed by your rrovider    Chills    Numbness, tingling, or weakness    Problems with bowel or bladder control, or problems having sex    Pain that does not go away, or gets worse    New  symptoms  [employer] last reviewed this educational content on 6/1/2019 2000-2021 [employer]. All rights reserved. This information is not intended as a substitute for professional medical care. Always follow your healthcare professional's instructions.         When should I call my doctor?  Your back pain should improve over the first couple of weeks. As it improves, you should be able to return to your normal activities. But call your doctor if:    You have a sudden change in your ability to control?your bladder or bowels.    You begin to feel tingling in your groin or legs.    The pain spreads down your leg and into your foot.    Your toes, feet or leg muscles begin to feel weak.    You feel generally unwell or sick.    Your pain gets worse.  For informational purposes only. Not to replace the advice of your health care provider.  Copyright   2018 [employer]. All rights reserved.           Patient Education     Understanding Lumbar Radiculopathy    Lumbar radiculopathy is irritation or inflammation of a nerve root in the low back. It causes symptoms that spread out from the back down one or both legs. To understand this condition, it helps to understand the parts of the spine:    Vertebrae. These are bones that stack to form the spine. The lumbar spine contains 5 vertebrae near the bottom of your spine.    Disks. These are soft pads of tissue between the vertebrae. They act as shock absorbers for the spine.    Spinal canal. This is a tunnel formed within the stacked vertebrae. In the lumbar spine, nerves run through this canal.    Nerves. These branch off and leave the spinal canal, traveling out to parts of the body. As they leave the spinal canal, nerves pass through openings between the vertebrae. The nerve root is the part of the nerve that is closest to the spinal canal.    Sciatic nerve. This is a large nerve formed from several nerve roots in the low back. This nerve extends  down the back of the leg to the foot.  With lumbar radiculopathy, nerve roots in the low back become irritated. This leads to pain and symptoms. The sciatic nerve is commonly involved, so the condition is often called sciatica.  What causes lumbar radiculopathy?  Aging, injury, poor posture, extra body weight, and other issues can lead to problems in the low back. These problems may then irritate nerve roots. They include:    Damage to a disk in the lumbar spine. The damaged disk may then press on nearby nerve roots.    Degeneration from wear and tear, and aging. This can lead to narrowing (stenosis) of the openings between the vertebrae. The narrowed openings press on nerve roots as they leave the spinal canal.    Unstable spine. This is when a vertebra slips forward. It can then press on a nerve root.  Other, less common things can put pressure on nerves in the low back. These include diabetes, infection, or a tumor.  Symptoms of lumbar radiculopathy  These include:    Pain in the low back    Pain, numbness, tingling, or weakness that travels into the buttocks, hip, groin, or leg    Muscle spasms in the low back, or leg  Treatment for lumbar radiculopathy  In most cases, your healthcare provider will first try treatments that help relieve symptoms. These may include:    Prescription and over-the-counter pain medicines. These help relieve pain, swelling, and irritation.    Limits on positions and activities that increase pain. But lying in bed or avoiding all movement is only recommended for a short period of time.    Physical therapy, including exercises and stretches. This helps decrease pain and increase movement and function.    Steroid shots into the lower back. This may help relieve symptoms for a time.    Weight-loss program. If you are overweight, losing extra pounds (kilograms) may help relieve symptoms.  In some cases, you may need surgery to fix the underlying problem. This depends on the cause, the  symptoms, and how long the pain has lasted.  Possible complications  Over time, an irritated and inflamed nerve may become damaged. This may lead to long-lasting (permanent) numbness or weakness in your legs and feet. If symptoms change suddenly or get worse, be sure to let your healthcare provider know.  When to call your healthcare provider  Call your healthcare provider right away if you have any of these:    New pain or pain that gets worse    New or increasing weakness, tingling, or numbness in your leg or foot    Problems controlling your bladder or bowel  [employer] last reviewed this educational content on 2/1/2020 2000-2021 [employer]. All rights reserved. This information is not intended as a substitute for professional medical care. Always follow your healthcare professional's instructions.

## 2021-10-18 NOTE — PATIENT INSTRUCTIONS
Start Medrol Dosepak as directed will help with inflammation due to your acute back injury    Start cyclobenzaprine take 1 tablet at night if you wake up in pain in all night you can take a second dose this is a muscle relaxant which should help with pain    Start Tylenol with codeine can take it up to every 6 hours cannot drive on this medication would try to reserve this more for night since her pain is more severe at night    Addendum  Hand written on avs the following    For pain    Start  Ibuprofen (motrin/advil)  800 mg 3 times a day always take with food for the next 5 to 7 days until pain resolves-maximum dose of ibuprofen is 2400 mg in 24 hours     Can alternate with     Acetaminophen (Tylenol ) 1000 mg 3 times a day for the next 5 to 7 days until pain resolves-maximum dose of acetaminophen (tylenol)  is 3000 mg in 24-hours    Kala Schmidt MD        Patient Education     Understanding Lumbosacral Strain    Lumbosacral strain is a medical term for an injury that causes low back pain. The lumbosacral area (low back) is between the bottom of the ribcage and the top of the buttocks. A strain is tearing of muscles and tendons. These tears can be very small but still cause pain.   How a lumbosacral strain happens  Muscles and tendons connected to the spine can be strained in a number of ways:    Sitting or standing in the same position for long periods of time. This can harm the low back over time. Poor posture can make low back pain more likely.    Moving the muscles and tendons past their usual range of motion. This can cause a sudden injury. This can happen when you twist, bend over, or lift something heavy. Not using correct technique for sports or tasks like lifting can make back injury more likely.    Accidents or falls  Lumbosacral strain can be caused by other problems, but these are less common.  Symptoms of lumbosacral strain  Symptoms may include:    Pain in the back, often on one side    Pain that  gets worse with movement and gets better with rest    Inability to move as freely as usual    Swelling, slight redness, and skin warmth in the painful area  Treatment for lumbosacral strain  Low back pain often goes away by itself within several weeks. But it often comes back. Treatment focuses on reducing pain and avoiding further injury. Bed rest is usually not recommended for low back pain. Treatments may include:     Avoiding or changing the action that caused the problem. This helps prevent injuring the tissues again.    Prescription or over-the-counter medicines. These help reduce inflammation, swelling, and pain. NSAIDs (nonsteroidal anti-inflammatory drugs) are the most common medicines used. Medicines may be prescribed or bought over the counter. They may be given as pills. Or they may be put on the skin a gel, cream, or patch.    Cold or heat packs. These help reduce pain and swelling.    Stretching and other exercises.  These improve flexibility and strength.    Physical therapy. This usually includes exercises and other treatments.    Injections of medicine. This may relieve symptoms. The medicine is usually a corticosteroid. This is a strong anti-inflammatory medicine.  If these treatments don't relieve symptoms, your healthcare provider may order imaging tests to learn more about the problem. Sometimes you may need surgery.   Possible complications of lumbosacral strain  If the cause of the pain is not addressed, symptoms may return or get worse. Follow your healthcare provider s instructions on lifestyle changes and treating your back.   When to call your healthcare provider  Call your healthcare provider right away if you have any of these:    Fever of 100.4 F (38 C) or higher, or as directed by your rrovider    Chills    Numbness, tingling, or weakness    Problems with bowel or bladder control, or problems having sex    Pain that does not go away, or gets worse    New symptoms  [employer] last  reviewed this educational content on 6/1/2019 2000-2021 [employer]. All rights reserved. This information is not intended as a substitute for professional medical care. Always follow your healthcare professional's instructions.         When should I call my doctor?  Your back pain should improve over the first couple of weeks. As it improves, you should be able to return to your normal activities. But call your doctor if:    You have a sudden change in your ability to control?your bladder or bowels.    You begin to feel tingling in your groin or legs.    The pain spreads down your leg and into your foot.    Your toes, feet or leg muscles begin to feel weak.    You feel generally unwell or sick.    Your pain gets worse.  For informational purposes only. Not to replace the advice of your health care provider.  Copyright   2018 [employer]. All rights reserved.           Patient Education     Understanding Lumbar Radiculopathy    Lumbar radiculopathy is irritation or inflammation of a nerve root in the low back. It causes symptoms that spread out from the back down one or both legs. To understand this condition, it helps to understand the parts of the spine:    Vertebrae. These are bones that stack to form the spine. The lumbar spine contains 5 vertebrae near the bottom of your spine.    Disks. These are soft pads of tissue between the vertebrae. They act as shock absorbers for the spine.    Spinal canal. This is a tunnel formed within the stacked vertebrae. In the lumbar spine, nerves run through this canal.    Nerves. These branch off and leave the spinal canal, traveling out to parts of the body. As they leave the spinal canal, nerves pass through openings between the vertebrae. The nerve root is the part of the nerve that is closest to the spinal canal.    Sciatic nerve. This is a large nerve formed from several nerve roots in the low back. This nerve extends down the back of the leg  to the foot.  With lumbar radiculopathy, nerve roots in the low back become irritated. This leads to pain and symptoms. The sciatic nerve is commonly involved, so the condition is often called sciatica.  What causes lumbar radiculopathy?  Aging, injury, poor posture, extra body weight, and other issues can lead to problems in the low back. These problems may then irritate nerve roots. They include:    Damage to a disk in the lumbar spine. The damaged disk may then press on nearby nerve roots.    Degeneration from wear and tear, and aging. This can lead to narrowing (stenosis) of the openings between the vertebrae. The narrowed openings press on nerve roots as they leave the spinal canal.    Unstable spine. This is when a vertebra slips forward. It can then press on a nerve root.  Other, less common things can put pressure on nerves in the low back. These include diabetes, infection, or a tumor.  Symptoms of lumbar radiculopathy  These include:    Pain in the low back    Pain, numbness, tingling, or weakness that travels into the buttocks, hip, groin, or leg    Muscle spasms in the low back, or leg  Treatment for lumbar radiculopathy  In most cases, your healthcare provider will first try treatments that help relieve symptoms. These may include:    Prescription and over-the-counter pain medicines. These help relieve pain, swelling, and irritation.    Limits on positions and activities that increase pain. But lying in bed or avoiding all movement is only recommended for a short period of time.    Physical therapy, including exercises and stretches. This helps decrease pain and increase movement and function.    Steroid shots into the lower back. This may help relieve symptoms for a time.    Weight-loss program. If you are overweight, losing extra pounds (kilograms) may help relieve symptoms.  In some cases, you may need surgery to fix the underlying problem. This depends on the cause, the symptoms, and how long the  pain has lasted.  Possible complications  Over time, an irritated and inflamed nerve may become damaged. This may lead to long-lasting (permanent) numbness or weakness in your legs and feet. If symptoms change suddenly or get worse, be sure to let your healthcare provider know.  When to call your healthcare provider  Call your healthcare provider right away if you have any of these:    New pain or pain that gets worse    New or increasing weakness, tingling, or numbness in your leg or foot    Problems controlling your bladder or bowel  [employer] last reviewed this educational content on 2/1/2020 2000-2021 [employer]. All rights reserved. This information is not intended as a substitute for professional medical care. Always follow your healthcare professional's instructions.

## 2021-10-21 NOTE — TELEPHONE ENCOUNTER
SPINE PATIENTS - NEW PROTOCOL PREVISIT    RECORDS RECEIVED FROM: Internal   REASON FOR VISIT: Acute right-sided low back pain with right-sided sciatica   Date of Appt: 11/4/21   NOTES (FOR ALL VISITS) STATUS DETAILS   OFFICE NOTE from referring provider Internal Dr Kala Schmidt @ [facility] PCP:  10/18/21   OFFICE NOTE from other specialist N/A    DISCHARGE SUMMARY from hospital N/A    DISCHARGE REPORT from ER N/A    EMG REPORT N/A    MEDICATION LIST Internal    IMAGING  (FOR ALL VISITS)     MRI (HEAD, NECK, SPINE) N/A    XRAY (SPINE) *NEUROSURGERY* Internal [facility]:  XR Lumbar Spine 10/18/21   CT (HEAD, NECK, SPINE) N/A

## 2021-11-01 ASSESSMENT — ENCOUNTER SYMPTOMS
NECK PAIN: 1
STIFFNESS: 1
MUSCLE WEAKNESS: 0
MUSCLE CRAMPS: 1
MYALGIAS: 1
BACK PAIN: 1
JOINT SWELLING: 0
ARTHRALGIAS: 1

## 2021-11-04 ENCOUNTER — OFFICE VISIT (OUTPATIENT)
Dept: NEUROSURGERY | Facility: CLINIC | Age: 46
End: 2021-11-04
Payer: COMMERCIAL

## 2021-11-04 ENCOUNTER — PRE VISIT (OUTPATIENT)
Dept: NEUROSURGERY | Facility: CLINIC | Age: 46
End: 2021-11-04

## 2021-11-04 VITALS
BODY MASS INDEX: 28.43 KG/M2 | HEART RATE: 93 BPM | WEIGHT: 187 LBS | OXYGEN SATURATION: 98 % | SYSTOLIC BLOOD PRESSURE: 129 MMHG | DIASTOLIC BLOOD PRESSURE: 85 MMHG | RESPIRATION RATE: 16 BRPM

## 2021-11-04 DIAGNOSIS — M54.41 ACUTE RIGHT-SIDED LOW BACK PAIN WITH RIGHT-SIDED SCIATICA: ICD-10-CM

## 2021-11-04 PROCEDURE — 99203 OFFICE O/P NEW LOW 30 MIN: CPT | Performed by: NURSE PRACTITIONER

## 2021-11-04 ASSESSMENT — PAIN SCALES - GENERAL: PAINLEVEL: MILD PAIN (3)

## 2021-11-04 NOTE — PROGRESS NOTES
"NEUROSURGERY CLINIC NOTE       Reason for visit:                    Back Pain     History of present illness:  Antonio Burger is a 46 year old male with past medical history of narcolepsy, anxiety, depression, who is seen for his back pain.   Patient was lifting a bag of cement and felt back pain.  This occurred on October 16th.  He does not have a history of back pain, or baseline back pain prior to this event.   He also felt that he had some \"tightness\" in the upper right leg,   but no radiating pain,or radiating numbness/tingling in to legs.   Ambulating independently without assistive device   No bowel or bladder concerns or complaints   He was seen by Dr. Kala Schmidt and Rx'd Flexeril, Tylenol w/codeine, and Medrol Dose phil.  Today,   He feels that he is \"quite a bit better\"  He can still have some discomfort if he sits for long period  He thinks this is \"normal for a pulled muscle\"   No constant pain, and significantly improved.   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 Started at 16 years     Surgical History:   Past Surgical History:   Procedure Laterality Date     NO HISTORY OF SURGERY       Medications:  Current Outpatient Medications   Medication     amphetamine-dextroamphetamine (ADDERALL) 10 MG tablet     clobetasol (TEMOVATE) 0.05 % external ointment     Dupilumab (DUPIXENT) 300 MG/2ML syringe     triamcinolone (KENALOG) 0.1 % external ointment     clobetasol (TEMOVATE) 0.05 % external ointment     cyclobenzaprine (FLEXERIL) 5 MG tablet            -  Has not been Taking      " desonide (DESOWEN) 0.05 % cream     hydrOXYzine (VISTARIL) 25 MG capsule     Methylphenidate HCl (RITALIN PO)     methylPREDNISolone (MEDROL DOSEPAK) 4 MG tablet therapy pack       Completed- Helpful      sildenafil (VIAGRA) 100 MG tablet     triamcinolone (KENALOG) 0.1 % external ointment     varenicline (CHANTIX STARTING MONTH ) 0.5 MG X 11 & 1 MG X 42 tablet   IBUPROFEN  800 MG                Takes as needed if pain is significant   Tylenol w/codeine                 Takes if pain is signifcant        Social History     Tobacco Use     Smoking status: Former Smoker     Packs/day: 1.00     Years: 13.00     Pack years: 13.00     Types: Cigarettes     Quit date: 2011     Years since quittin.8     Smokeless tobacco: Never Used   Substance Use Topics     Alcohol use: Yes     Comment: occ     Drug use: No       Family History   Problem Relation Age of Onset     Diabetes Mother         type 2     Hypertension Mother      Depression Mother      Gastrointestinal Disease Father         reflux     Cancer Paternal Grandmother         ?     Cancer Maternal Grandmother         stomach     Physical exam:        /85   Pulse 93   Resp 16   Wt 84.8 kg (187 lb)   SpO2 98%   BMI 28.43 kg/m      General    Alert, cooperative.  No acute distress  Pulmonary:   Breathing comfortably on room air. No cough, or shortness of breath  Skin:    Visible skin without lesions or obvious rash  Speech is fluent  Maintains eye contact  Musculoskeletal:    Moving extremities freely with good strength  Lower extremity strength testing intact at 5/5  Including dorsiflexion, plantarflexion, and EHL  Negative pelvic compression test  Negative thigh thrust   Able to walk on heels /toes  Good lumbar flexion/extension   Negative straight leg raise bilaterally   Negative NKECHI bilaterally    Imaging:  XR LUMBAR SPINE FOUR OR MORE VIEWS   10/18/2021 11:22 AM      COMPARISON: None.                                                                    IMPRESSION: Minimal retrolisthesis is present at L5-S1. Posterior  alignment is otherwise normal. Vertebral body heights are maintained.  Disc spaces are relatively preserved. No intraosseous lesions noted.  Oblique views show intact pars. There is no evidence for any  spondylolysis.     TORRI RIVERA MD      Assessment:  ~Acute lumbar back strain, improved.  ~He is significantly improved w/Ibuprofen, Oral steroid, Flexeril and Tylenol w/codeine.   ~He is not having significant back pain.   ~He does not have signs of lumbar radiculopathy of radiating leg pain, numbness/tingling or weakness.   ~Physical Exam is negative for sign of herniated disc.     Plan:  ~Recommend continued non-surgical, conservative treatment and pain management   ~Anti-inflammatory medications such as Aleve, Ibuprofen (Advil) as needed  ~Back stretching, strengthening exercises  ~Patient instructed to call the clinic if develops new back pain, that is constant is not relieved by Tylenol/Ibuprofen or rest, or has new radiating pain, numbness/tingling, or weakness in the leg(s)   ~Unless the patient develops new pain or symptoms, no further imaging is required, and at this time, can be discharged from the Neurosurgery Clinic, and can return on an as-needed basis      At the end of the visit, all the patient's questions and concerns had been addressed and the patient was agreeable with the plan of care as outlined above. The patient has our office contact information at [phone], and knows to call with any questions, concerns, or changes in condition.        Christine Masters DNP  Neurosurgery Nurse Practitioner  [facility]  [phone]    Answers for HPI/ROS submitted by the patient on 11/1/2021  General Symptoms: No  Skin Symptoms: No  HENT Symptoms: No  EYE SYMPTOMS: No  HEART SYMPTOMS: No  LUNG SYMPTOMS: No  INTESTINAL SYMPTOMS: No  URINARY SYMPTOMS: No  REPRODUCTIVE SYMPTOMS: No  SKELETAL SYMPTOMS:  Yes  BLOOD SYMPTOMS: No  NERVOUS SYSTEM SYMPTOMS: No  MENTAL HEALTH SYMPTOMS: No  Back pain: Yes  Muscle aches: Yes  Neck pain: Yes  Swollen joints: No  Joint pain: Yes  Bone pain: No  Muscle cramps: Yes  Muscle weakness: No  Joint stiffness: Yes  Bone fracture: No

## 2021-11-04 NOTE — LETTER
"11/4/2021       RE: Antonio Burger  [address]  [place] [address]     Dear Colleague,    Thank you for referring your patient, Antonio Burger, to the [facility] [place] [facility]. Please see a copy of my visit note below.    NEUROSURGERY CLINIC NOTE       Reason for visit:                    Back Pain     History of present illness:  Antonio Burger is a 46 year old male with past medical history of narcolepsy, anxiety, depression, who is seen for his back pain.   Patient was lifting a bag of cement and felt back pain.  This occurred on October 16th.  He does not have a history of back pain, or baseline back pain prior to this event.   He also felt that he had some \"tightness\" in the upper right leg,   but no radiating pain,or radiating numbness/tingling in to legs.   Ambulating independently without assistive device   No bowel or bladder concerns or complaints   He was seen by Dr. Kala Schmidt and Rx'd Flexeril, Tylenol w/codeine, and Medrol Dose phil.  Today,   He feels that he is \"quite a bit better\"  He can still have some discomfort if he sits for long period  He thinks this is \"normal for a pulled muscle\"   No constant pain, and significantly improved.   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 Started at 16 years     Surgical History:   Past Surgical History:   Procedure Laterality Date     NO HISTORY OF SURGERY       Medications:  Current Outpatient Medications   Medication     " amphetamine-dextroamphetamine (ADDERALL) 10 MG tablet     clobetasol (TEMOVATE) 0.05 % external ointment     Dupilumab (DUPIXENT) 300 MG/2ML syringe     triamcinolone (KENALOG) 0.1 % external ointment     clobetasol (TEMOVATE) 0.05 % external ointment     cyclobenzaprine (FLEXERIL) 5 MG tablet            -  Has not been Taking      desonide (DESOWEN) 0.05 % cream     hydrOXYzine (VISTARIL) 25 MG capsule     Methylphenidate HCl (RITALIN PO)     methylPREDNISolone (MEDROL DOSEPAK) 4 MG tablet therapy pack       Completed- Helpful      sildenafil (VIAGRA) 100 MG tablet     triamcinolone (KENALOG) 0.1 % external ointment     varenicline (CHANTIX STARTING MONTH ) 0.5 MG X 11 & 1 MG X 42 tablet   IBUPROFEN  800 MG                Takes as needed if pain is significant   Tylenol w/codeine                 Takes if pain is signifcant        Social History     Tobacco Use     Smoking status: Former Smoker     Packs/day: 1.00     Years: 13.00     Pack years: 13.00     Types: Cigarettes     Quit date: 2011     Years since quittin.8     Smokeless tobacco: Never Used   Substance Use Topics     Alcohol use: Yes     Comment: occ     Drug use: No       Family History   Problem Relation Age of Onset     Diabetes Mother         type 2     Hypertension Mother      Depression Mother      Gastrointestinal Disease Father         reflux     Cancer Paternal Grandmother         ?     Cancer Maternal Grandmother         stomach     Physical exam:        /85   Pulse 93   Resp 16   Wt 84.8 kg (187 lb)   SpO2 98%   BMI 28.43 kg/m      General    Alert, cooperative.  No acute distress  Pulmonary:   Breathing comfortably on room air. No cough, or shortness of breath  Skin:    Visible skin without lesions or obvious rash  Speech is fluent  Maintains eye contact  Musculoskeletal:    Moving extremities freely with good strength  Lower extremity strength testing intact at 5/5  Including dorsiflexion, plantarflexion, and  EHL  Negative pelvic compression test  Negative thigh thrust   Able to walk on heels /toes  Good lumbar flexion/extension   Negative straight leg raise bilaterally   Negative NKECHI bilaterally    Imaging:  XR LUMBAR SPINE FOUR OR MORE VIEWS   10/18/2021 11:22 AM      COMPARISON: None.                                                                   IMPRESSION: Minimal retrolisthesis is present at L5-S1. Posterior  alignment is otherwise normal. Vertebral body heights are maintained.  Disc spaces are relatively preserved. No intraosseous lesions noted.  Oblique views show intact pars. There is no evidence for any  spondylolysis.     TORRI RIVERA MD      Assessment:  ~Acute lumbar back strain, improved.  ~He is significantly improved w/Ibuprofen, Oral steroid, Flexeril and Tylenol w/codeine.   ~He is not having significant back pain.   ~He does not have signs of lumbar radiculopathy of radiating leg pain, numbness/tingling or weakness.   ~Physical Exam is negative for sign of herniated disc.     Plan:  ~Recommend continued non-surgical, conservative treatment and pain management   ~Anti-inflammatory medications such as Aleve, Ibuprofen (Advil) as needed  ~Back stretching, strengthening exercises  ~Patient instructed to call the clinic if develops new back pain, that is constant is not relieved by Tylenol/Ibuprofen or rest, or has new radiating pain, numbness/tingling, or weakness in the leg(s)   ~Unless the patient develops new pain or symptoms, no further imaging is required, and at this time, can be discharged from the Neurosurgery Clinic, and can return on an as-needed basis      At the end of the visit, all the patient's questions and concerns had been addressed and the patient was agreeable with the plan of care as outlined above. The patient has our office contact information at [phone], and knows to call with any questions, concerns, or changes in condition.        Christine Masters, ISABEL  Neurosurgery Nurse  Practitioner  [facility]  [phone]    Answers for HPI/ROS submitted by the patient on 11/1/2021  General Symptoms: No  Skin Symptoms: No  HENT Symptoms: No  EYE SYMPTOMS: No  HEART SYMPTOMS: No  LUNG SYMPTOMS: No  INTESTINAL SYMPTOMS: No  URINARY SYMPTOMS: No  REPRODUCTIVE SYMPTOMS: No  SKELETAL SYMPTOMS: Yes  BLOOD SYMPTOMS: No  NERVOUS SYSTEM SYMPTOMS: No  MENTAL HEALTH SYMPTOMS: No  Back pain: Yes  Muscle aches: Yes  Neck pain: Yes  Swollen joints: No  Joint pain: Yes  Bone pain: No  Muscle cramps: Yes  Muscle weakness: No  Joint stiffness: Yes  Bone fracture: No          Again, thank you for allowing me to participate in the care of your patient.      Sincerely,    ELICIA Park CNP

## 2021-11-04 NOTE — PATIENT INSTRUCTIONS
~Recommend continued non-surgical, conservative treatment and pain management   ~Anti-inflammatory medications such as Aleve, Ibuprofen (Advil) as needed  ~Back stretching, strengthening exercises  ~Patient instructed to call the clinic if develops new back pain, that is constant is not relieved by Tylenol/Ibuprofen or rest, or has new radiating pain, numbness/tingling, or weakness in the leg(s)   ~Unless the patient develops new pain or symptoms, no further imaging is required, and at this time, can be discharged from the Neurosurgery Clinic, and can return on an as-needed basis      At the end of the visit, all the patient's questions and concerns had been addressed and the patient was agreeable with the plan of care as outlined above. The patient has our office contact information at [phone], and knows to call with any questions, concerns, or changes in condition.

## 2021-11-23 ENCOUNTER — OFFICE VISIT (OUTPATIENT)
Dept: DERMATOLOGY | Facility: CLINIC | Age: 46
End: 2021-11-23
Payer: COMMERCIAL

## 2021-11-23 VITALS — DIASTOLIC BLOOD PRESSURE: 84 MMHG | SYSTOLIC BLOOD PRESSURE: 128 MMHG

## 2021-11-23 DIAGNOSIS — D48.5 NEOPLASM OF UNCERTAIN BEHAVIOR OF SKIN: Primary | ICD-10-CM

## 2021-11-23 PROCEDURE — 88304 TISSUE EXAM BY PATHOLOGIST: CPT | Mod: TC | Performed by: DERMATOLOGY

## 2021-11-23 PROCEDURE — 13132 CMPLX RPR F/C/C/M/N/AX/G/H/F: CPT | Mod: GC | Performed by: DERMATOLOGY

## 2021-11-23 PROCEDURE — 88304 TISSUE EXAM BY PATHOLOGIST: CPT | Mod: 26 | Performed by: DERMATOLOGY

## 2021-11-23 PROCEDURE — 11424 EXC H-F-NK-SP B9+MARG 3.1-4: CPT | Mod: GC | Performed by: DERMATOLOGY

## 2021-11-23 ASSESSMENT — PAIN SCALES - GENERAL: PAINLEVEL: NO PAIN (0)

## 2021-11-23 NOTE — PROGRESS NOTES
[facility] Dermatologic Surgery Excision Note    Case #: 1  Date of Service:  Nov 23, 2021  Surgery: Wide local excision  Staff Surgeon: Popeye Sanabria MD  Fellow Surgeon: Gurpreet Lao MD  Resident Surgeon: June Salcido MD  Nurse: Moon Hamlin CMA    Tumor Type: NUB (clinically suspected lipoma)  Location: Posterior neck    Skin Lesion Size:  3.5 cm x 3.5 cm  Margin: 0 mm  Excision size: 3.5 cm x 3.5 cm  Level of Defect: Fat  Repair Type: Complex linear  Repair Size: 6.0 cm  Suture Material: 4-0 Monocryl; 5-0 fast absorbing gut    REPAIR:     Due to one or more of the following factors, complex linear closure was required/indicated: Extensive undermining (equal to or greater than the maximum perpendicular width of the defect), exposure of underlying structure (bone, cartilage, tendon, named neurovascular), free margin involvement (helical rim, vermillion border, nostril rim), and/or placement of retention sutures.    After the excision of the tumor, the area was extensively and carefully undermined using tissue scissors. Hemostasis was obtained with spot electrocautery and ligation of vessels where necessary. Initial deep plication sutures of 4-0 Monocryl sutures were placed in the deep, subcutaneous, and fascial planes to appose the lateral margins. The subcutaneous and dermal layers were then closed with 4-0 Monocryl sutures. The epidermis was then carefully approximated along the length of the wound using 5-0 fast absorbing gut running sutures.     Estimated blood loss was less than 10 ml for all surgical sites. A sterile pressure dressing was applied and wound care instructions, with a written handout, were given. The patient was discharged from the Dermatologic Surgery Center alert and ambulatory.    Popeye Sanabria MD staffed the patient and was present for the key portions of the above procedure.       Popeye Sanabria MD was immediately available for the entire surgery and was physicially present for  the key portions of the procedure.    Gurpreet Lao MD  Micrographic Surgery and Dermatologic Oncology (MSDO) Fellow    Scribe Disclosure:  I, Lakeisha Alejandro, am serving as a scribe to document services personally performed by Popeye Sanabria MD based on data collection and the provider's statements to me.

## 2021-11-23 NOTE — LETTER
11/23/2021       RE: Antonio Burger  [address]  [place] [address]     Dear Colleague,    Thank you for referring your patient, Antonio Burger, to the [facility] DERMATOLOGIC SURGERY CLINIC [place] [facility]. Please see a copy of my visit note below.    [facility] Dermatologic Surgery Excision Note    Case #: 1  Date of Service:  Nov 23, 2021  Surgery: Wide local excision  Staff Surgeon: Popeye Sanabria MD  Fellow Surgeon: Gurpreet Lao MD  Resident Surgeon: June Salcido MD  Nurse: Moon Hamlin CMA    Tumor Type: NUB (clinically suspected lipoma)  Location: Posterior neck    Skin Lesion Size:  3.5 cm x 3.5 cm  Margin: 0 mm  Excision size: 3.5 cm x 3.5 cm  Level of Defect: Fat  Repair Type: Complex linear  Repair Size: 6.0 cm  Suture Material: 4-0 Monocryl; 5-0 fast absorbing gut    REPAIR:     Due to one or more of the following factors, complex linear closure was required/indicated: Extensive undermining (equal to or greater than the maximum perpendicular width of the defect), exposure of underlying structure (bone, cartilage, tendon, named neurovascular), free margin involvement (helical rim, vermillion border, nostril rim), and/or placement of retention sutures.    After the excision of the tumor, the area was extensively and carefully undermined using tissue scissors. Hemostasis was obtained with spot electrocautery and ligation of vessels where necessary. Initial deep plication sutures of 4-0 Monocryl sutures were placed in the deep, subcutaneous, and fascial planes to appose the lateral margins. The subcutaneous and dermal layers were then closed with 4-0 Monocryl sutures. The epidermis was then carefully approximated along the length of the wound using 5-0 fast absorbing gut running sutures.     Estimated blood loss was less than 10 ml for all surgical sites. A sterile pressure dressing was applied and wound care instructions,  with a written handout, were given. The patient was discharged from the Dermatologic Surgery Center alert and ambulatory.    Popeye Sanabria MD staffed the patient and was present for the key portions of the above procedure.       Popeye Sanabria MD was immediately available for the entire surgery and was physicially present for the key portions of the procedure.    Gurpreet Lao MD  Micrographic Surgery and Dermatologic Oncology (MSDO) Fellow    Scribe Disclosure:  I, Lakeisha Alejandro, am serving as a scribe to document services personally performed by Popeye Sanabria MD based on data collection and the provider's statements to me.     Attestation signed by Popeye Sanabria MD at 11/29/2021 11:33 AM:    Attending attestation:  I was present for key elements of the procedure and immediately available for all other portions of the procedure.  I have reviewed the note and edited it as necessary.    Popeye Sanabria M.D.  Professor  Director of Dermatologic Surgery  Department of Dermatology  [facility]    Dermatology Surgery Clinic  [facility]  [address] [place] [id number]

## 2021-11-29 LAB
PATH REPORT.COMMENTS IMP SPEC: NORMAL
PATH REPORT.COMMENTS IMP SPEC: NORMAL
PATH REPORT.FINAL DX SPEC: NORMAL
PATH REPORT.GROSS SPEC: NORMAL
PATH REPORT.MICROSCOPIC SPEC OTHER STN: NORMAL
PATH REPORT.RELEVANT HX SPEC: NORMAL

## 2021-12-14 ENCOUNTER — TELEPHONE (OUTPATIENT)
Dept: DERMATOLOGY | Facility: CLINIC | Age: 46
End: 2021-12-14

## 2021-12-14 NOTE — TELEPHONE ENCOUNTER
PA Initiation    Medication: Dupixent  Insurance Company: [employer] - Phone [phone] Fax [phone]  Pharmacy Filling the Rx: [facility] - [place], [place] - [facility] [address]  Filling Pharmacy Phone: [phone]  Filling Pharmacy Fax: [phone]  Start Date: 12/14/2021

## 2022-01-09 ENCOUNTER — HEALTH MAINTENANCE LETTER (OUTPATIENT)
Age: 47
End: 2022-01-09

## 2022-03-04 ENCOUNTER — MYC MEDICAL ADVICE (OUTPATIENT)
Dept: DERMATOLOGY | Facility: CLINIC | Age: 47
End: 2022-03-04
Payer: COMMERCIAL

## 2022-04-25 DIAGNOSIS — L20.84 INTRINSIC ATOPIC DERMATITIS: ICD-10-CM

## 2022-04-27 RX ORDER — DUPILUMAB 300 MG/2ML
300 INJECTION, SOLUTION SUBCUTANEOUS
Qty: 4 ML | Refills: 6 | Status: SHIPPED | OUTPATIENT
Start: 2022-04-27 | End: 2022-05-31

## 2022-04-27 NOTE — TELEPHONE ENCOUNTER
Received refill request as ELLA. Chart (including notes and pertinent labs) reviewed, refill appropriate. Attending Dr. Spivey CC'd as an FYI.      Karen Leone MD (PGY4)  Dermatology Resident

## 2022-04-27 NOTE — TELEPHONE ENCOUNTER
Dupilumab (DUPIXENT) 300 MG/2ML syringe      Last Written Prescription Date:  4-1-21  Last Fill Quantity: 4 ml,   # refills: 11  Last Office Visit : 11-23-21  Future Office visit:  5-31-22    Routing refill request to provider for review/approval because:  Med not on derm protocol

## 2022-05-24 NOTE — PROGRESS NOTES
[facility] Dermatology Note  Encounter Date: May 31, 2022  Office Visit     Dermatology Problem List:  # Nummular dermatitis / atopic dermatitis, bilateral lower extremities, upper extremities, hands. S/p bx 10/19.   - dupixent started 10/19 with near complete resolution  - triam 0.1% ointment /  clobetasol 0.05% ointment BID PRN for legs / arms  - desonide cream BID PRN for face  - previous rx: oral prednisone  # Lipoma, posterior neck, s/p excision  ____________________________________________    Assessment & Plan:    #  Nummular/atopic dermatitis. Very well controlled on dupixent with minimal side effects.   - Continue dupixent 300 mg q2 weeks; 1-year refill provided  - Can use triam 0.1% ointment BID PRN flares; refill provided.   - Diligent moisturization     Procedures Performed:   None    Follow-up: 1 year(s) virtually (telephone with photos), or earlier for new or changing lesions    Staff and Scribe:     Scribe Disclosure:  I, Melissa Hinkle, am serving as a scribe to document services personally performed by Gaston Spivey MD based on data collection and the provider's statements to me.     Provider Disclosure:   The documentation recorded by the scribe accurately reflects the services I personally performed and the decisions made by me.    Gaston Spivey MD    Department of Dermatology  [facility]: Phone: [phone], Fax:[phone]  [facility]: Phone: [phone] Fax: [phone]  ____________________________________________    CC: Derm Problem (Antonio is here for a follow-up for a rash on his hands. States it has significantly improved since last seen. )    HPI:  Mr. Antonio Burger is a(n) 47 year old male who presents today as a return patient for a follow-up on atopic dermatitis/hand eczema. The patient was last seen in dermatology by Dr. Sanabria on  11/23/2021 at which point an NUB (clinically suspected lipoma) on his posterior neck was excised.    Today, the patient reports that his hand eczema is well controlled. He still does get occasional spots on his ankle and upper extremities, but these are not bothersome and respond well to triamcinolone 0.1% ointment. He has been taking dupixent without side effects. Overall, very pleased with results.     Patient is otherwise feeling well, without additional skin concerns.    Labs Reviewed:  N/A    Physical Exam:  Vitals: There were no vitals taken for this visit.  SKIN: Focused examination of hands, feet, and upper extremities was performed.  - Pink, scaly thin plaque on the ankle. Otherwise, hands and upper extremities relatively clear.   - No other lesions of concern on areas examined.     Medications:  Current Outpatient Medications   Medication     amphetamine-dextroamphetamine (ADDERALL) 10 MG tablet     clobetasol (TEMOVATE) 0.05 % external ointment     clobetasol (TEMOVATE) 0.05 % external ointment     Dupilumab (DUPIXENT) 300 MG/2ML syringe     triamcinolone (KENALOG) 0.1 % external ointment     cyclobenzaprine (FLEXERIL) 5 MG tablet     desonide (DESOWEN) 0.05 % cream     hydrOXYzine (VISTARIL) 25 MG capsule     Methylphenidate HCl (RITALIN PO)     methylPREDNISolone (MEDROL DOSEPAK) 4 MG tablet therapy pack     sildenafil (VIAGRA) 100 MG tablet     triamcinolone (KENALOG) 0.1 % external ointment     varenicline (CHANTIX STARTING MONTH PAK) 0.5 MG X 11 & 1 MG X 42 tablet     No current facility-administered medications for this visit.      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 Started at 16 years

## 2022-05-31 ENCOUNTER — OFFICE VISIT (OUTPATIENT)
Dept: DERMATOLOGY | Facility: CLINIC | Age: 47
End: 2022-05-31
Payer: COMMERCIAL

## 2022-05-31 DIAGNOSIS — L20.84 INTRINSIC ATOPIC DERMATITIS: ICD-10-CM

## 2022-05-31 DIAGNOSIS — L30.9 DERMATITIS: ICD-10-CM

## 2022-05-31 PROCEDURE — 99213 OFFICE O/P EST LOW 20 MIN: CPT | Performed by: DERMATOLOGY

## 2022-05-31 RX ORDER — DUPILUMAB 300 MG/2ML
300 INJECTION, SOLUTION SUBCUTANEOUS
Qty: 4 ML | Refills: 11 | Status: SHIPPED | OUTPATIENT
Start: 2022-05-31 | End: 2023-06-22

## 2022-05-31 RX ORDER — TRIAMCINOLONE ACETONIDE 1 MG/G
OINTMENT TOPICAL
Qty: 80 G | Refills: 11 | Status: SHIPPED | OUTPATIENT
Start: 2022-05-31

## 2022-05-31 ASSESSMENT — PAIN SCALES - GENERAL: PAINLEVEL: NO PAIN (0)

## 2022-05-31 NOTE — NURSING NOTE
Dermatology Rooming Note    Antonio Burger's goals for this visit include:   Chief Complaint   Patient presents with     Derm Problem     Antonio is here for a follow-up for a rash on his hands. States it has significantly improved since last seen.      Raphael Michelle, EMT

## 2022-05-31 NOTE — LETTER
5/31/2022       RE: Antonio Burger  [address]  [place] [address]     Dear Colleague,    Thank you for referring your patient, Antonio Burger, to the [facility] DERMATOLOGY CLINIC [facility]. Please see a copy of my visit note below.    [facility] Dermatology Note  Encounter Date: May 31, 2022  Office Visit     Dermatology Problem List:  # Nummular dermatitis / atopic dermatitis, bilateral lower extremities, upper extremities, hands. S/p bx 10/19.   - dupixent started 10/19 with near complete resolution  - triam 0.1% ointment /  clobetasol 0.05% ointment BID PRN for legs / arms  - desonide cream BID PRN for face  - previous rx: oral prednisone  # Lipoma, posterior neck, s/p excision  ____________________________________________    Assessment & Plan:    #  Nummular/atopic dermatitis. Very well controlled on dupixent with minimal side effects.   - Continue dupixent 300 mg q2 weeks; 1-year refill provided  - Can use triam 0.1% ointment BID PRN flares; refill provided.   - Diligent moisturization     Procedures Performed:   None    Follow-up: 1 year(s) virtually (telephone with photos), or earlier for new or changing lesions    Staff and Scribe:     Scribe Disclosure:  I, Melissa Hinkle, am serving as a scribe to document services personally performed by Gaston Spivey MD based on data collection and the provider's statements to me.     Provider Disclosure:   The documentation recorded by the scribe accurately reflects the services I personally performed and the decisions made by me.    Gaston Spivey MD    Department of Dermatology  [facility]: Phone: [phone], Fax:[phone]  [facility]: Phone: [phone] Fax: [phone]  ____________________________________________    CC: Derm Problem  (Antonio is here for a follow-up for a rash on his hands. States it has significantly improved since last seen. )    HPI:  Mr. Antonio Burger is a(n) 47 year old male who presents today as a return patient for a follow-up on atopic dermatitis/hand eczema. The patient was last seen in dermatology by Dr. Sanabria on 11/23/2021 at which point an NUB (clinically suspected lipoma) on his posterior neck was excised.    Today, the patient reports that his hand eczema is well controlled. He still does get occasional spots on his ankle and upper extremities, but these are not bothersome and respond well to triamcinolone 0.1% ointment. He has been taking dupixent without side effects. Overall, very pleased with results.     Patient is otherwise feeling well, without additional skin concerns.    Labs Reviewed:  N/A    Physical Exam:  Vitals: There were no vitals taken for this visit.  SKIN: Focused examination of hands, feet, and upper extremities was performed.  - Pink, scaly thin plaque on the ankle. Otherwise, hands and upper extremities relatively clear.   - No other lesions of concern on areas examined.     Medications:  Current Outpatient Medications   Medication     amphetamine-dextroamphetamine (ADDERALL) 10 MG tablet     clobetasol (TEMOVATE) 0.05 % external ointment     clobetasol (TEMOVATE) 0.05 % external ointment     Dupilumab (DUPIXENT) 300 MG/2ML syringe     triamcinolone (KENALOG) 0.1 % external ointment     cyclobenzaprine (FLEXERIL) 5 MG tablet     desonide (DESOWEN) 0.05 % cream     hydrOXYzine (VISTARIL) 25 MG capsule     Methylphenidate HCl (RITALIN PO)     methylPREDNISolone (MEDROL DOSEPAK) 4 MG tablet therapy pack     sildenafil (VIAGRA) 100 MG tablet     triamcinolone (KENALOG) 0.1 % external ointment     varenicline (CHANTIX STARTING MONTH PAK) 0.5 MG X 11 & 1 MG X 42 tablet     No current facility-administered medications for this visit.      Past Medical History:   Patient Active Problem List    Diagnosis     CARDIOVASCULAR SCREENING; LDL GOAL LESS THAN 160     Cellulitis     Tobacco abuse     Nummular eczema     Nummular dermatitis     Past Medical History:   Diagnosis Date     Anxiety      Depressive disorder      Lipoma of other specified sites  2007     neck area- plans observation     Narcolepsy without cataplexy in conditions classified elsewhere      Tobacco use disorder quit on 12/26/11    Smokes 1 ppd. Started at 16 years

## 2022-11-21 ENCOUNTER — HEALTH MAINTENANCE LETTER (OUTPATIENT)
Age: 47
End: 2022-11-21

## 2023-02-17 ENCOUNTER — OFFICE VISIT (OUTPATIENT)
Dept: NEUROSURGERY | Facility: CLINIC | Age: 48
End: 2023-02-17
Payer: COMMERCIAL

## 2023-02-17 ENCOUNTER — TELEPHONE (OUTPATIENT)
Dept: NEUROLOGY | Facility: CLINIC | Age: 48
End: 2023-02-17

## 2023-02-17 ENCOUNTER — PRE VISIT (OUTPATIENT)
Dept: NEUROSURGERY | Facility: CLINIC | Age: 48
End: 2023-02-17

## 2023-02-17 VITALS
SYSTOLIC BLOOD PRESSURE: 149 MMHG | BODY MASS INDEX: 30.46 KG/M2 | HEART RATE: 105 BPM | DIASTOLIC BLOOD PRESSURE: 94 MMHG | WEIGHT: 200.3 LBS

## 2023-02-17 DIAGNOSIS — M54.16 ACUTE LEFT LUMBAR RADICULOPATHY: Primary | ICD-10-CM

## 2023-02-17 PROCEDURE — 99204 OFFICE O/P NEW MOD 45 MIN: CPT | Performed by: PHYSICAL MEDICINE & REHABILITATION

## 2023-02-17 RX ORDER — GABAPENTIN 300 MG/1
300 CAPSULE ORAL 3 TIMES DAILY
Qty: 90 CAPSULE | Refills: 3 | Status: ON HOLD | OUTPATIENT
Start: 2023-02-17 | End: 2023-12-12

## 2023-02-17 RX ORDER — METHYLPREDNISOLONE 4 MG
TABLET, DOSE PACK ORAL
Qty: 21 TABLET | Refills: 0 | Status: SHIPPED | OUTPATIENT
Start: 2023-02-17 | End: 2023-07-31

## 2023-02-17 ASSESSMENT — PAIN SCALES - GENERAL: PAINLEVEL: MODERATE PAIN (5)

## 2023-02-17 NOTE — PATIENT INSTRUCTIONS
Is was a pleasure meeting you in clinic today.  We discussed the following.    It seems like you have symptoms consistent with an acute left L5 radiculopathy; likely a disc is impinging on the L5 nerve contributing to your pain, numbness/tingling and weakness.    #.  Start Medrol Dosepak.  Do not take other NSAIDs such as ibuprofen, naproxen, etc. while you are taking oral steroids.  You can resume over-the-counter NSAIDs as needed after you are done with this treatment.    #.  You can also take Tylenol (acetaminophen) for pain relief. Acetaminophen 500 - 1,000mg (1-2 tablets) every 8 hours as needed for pain. Max acetaminophen 3,000mg per day (or 6 of the 500mg tablets).     #. Gabapentin (Neurontin)           AM        PM       BEDTIME    Day 0-5         -            -             300mg  Day 5-10      300       -              300mg  Day 10+       300       300         300mg    Continue to increase your dose as discussed above if you are not experiencing any side effects (in other words - if you experience side effects do not progress to the next week). If you are experiencing any side effects, return to the previous dose that was tolerable and continue until follow-up.     The most common side effect is sedation. Do not drive a motor vehicle until after you are familiar with how the medication may impact your attention and reaction.    Note that it may take several weeks to notice the benefits of this medication.   Do not abruptly stopped this medication prior to consulting with a physician.     #.  I have also ordered an MRI of your lumbar spine.  Given your claustrophobia, you can try to get have this done through a radiology service that offers an open MRI.  You can check with [facility] radiology or likely [facility] will offer this service as well.    #.  I have also placed a referral for physical therapy.  Hopefully once you are symptoms are under better control you can start physical therapy.    #.  Once  your MRI has been completed we will review your scans and discuss neck steps.  If you are having continued pain or symptoms that are not responding to the above management, please let our clinic know. MRI scheduling number for [facility]: [phone]

## 2023-02-17 NOTE — LETTER
2/17/2023         RE: Antonio Burger  [address]  [place] [address]        Dear Colleague,    Thank you for referring your patient, Antonio Burger, to the [facility]UROS[facility] [place]. Please see a copy of my visit note below.    HISTORY OF PRESENT ILLNESS:  Antonio Burger is a 48 year old male who presents with a chief complaint of left lower extremity pain.     Pain began February 12, 2023 and is not associated with any particular trauma or inciting event.  He was in his usual state of health prior.  He reports that he is an otherwise relatively healthy 48-year-old individual with history of narcolepsy that has been well managed for many years.  Per chart review and discussion with patient, he had been seen in 2021 for acute right-sided low back pain in the neurosurgery clinic which had improved/resolved with minimal treatment or management (medrol dosepak and flexeril).  He denies any major chronic spine pain.  He did see a chiropractor a few days after onset of symptoms which did not help.  His wife is an RN at [employer] and referred him to our [facility] for further evaluation.    Currently he localizes pain to the left gluteal region with radiating symptoms to the thigh that is painful, he does not have significant or consistent leg symptoms, but endorses numbness/tingling in the foot (dorsum); described as cramping and sharp in nature. Pain is reported as 8/10 at rest today and up to 9/10 at worst in the last week. No particular aggravating factors; and improved with ibuprofen/aleve (helps with back). He does report pain-related sleep disturbance - switched beds in his house with some improvement.  No bowel/bladder issues or saddle anesthesia.    Functional limitations: States that gait and ambulation have been disrupted due to pain and paresthesias.       PRIOR INJURIES/TREATMENT:   Ice/Heat: heat pad  Physical Therapy:   none      - Current Pain Medications -    OTC NSAIDs  Lidoderm patch    Prior Procedures:  Date    Procedure   Improvement (%)  none              Prior Related Surgery: none   Other (acupuncture, OMT, CMM, TENS, DME, etc.):   Chiropractor a few days after     Specialists Seen - (with most recent, available notes and clinic visits reviewed)   1. None     IMAGING - reviewed   10/18/2021 x-ray lumbar spine shows minimal retrolisthesis at L5-S1 with relatively well preserved disc space and without evidence of anterolisthesis or spondylolysis.    Review Of Systems:  I am responding to those symptoms which are directly relevant to the specific indication for my consultation. I recommend that the patient follow up with their primary or referring provider to pursue any other symptoms which may be of concern.       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     He  has a past surgical history that includes no history of surgery.    Family History  His family history includes Cancer in his maternal grandmother and paternal grandmother; Depression in his mother; Diabetes in his mother; Gastrointestinal Disease in his father; Hypertension in his mother.     Social History:  Work: Technical support. Sit/stand desk in office.   Current living situation: Lives with wife who is an RN at [employer].   He does not smoke, drink alcohol, or illicit drug use.        Current Medications:   He has a current medication list which includes the following prescription(s): amphetamine-dextroamphetamine, dupixent, clobetasol, clobetasol, cyclobenzaprine, desonide, hydroxyzine, methylphenidate hcl, methylprednisolone, sildenafil, triamcinolone, triamcinolone, and varenicline.     Allergies:    -- No Known Drug Allergies     PHYSICAL EXAMINATION:  BP (!) 149/94 (BP Location: Right arm, Patient Position:  Sitting, Cuff Size: Adult Large)   Pulse 105   Wt 90.9 kg (200 lb 4.8 oz)   BMI 30.46 kg/m     General: Pleasant, straightforward, WDWN individual.  Mental Status: Pleasant, direct, appropriate mood and affect  Resp: breathing is unlabored without audible wheeze  Vascular: Palpable pedal pulses, no cyanosis, no venous stasis changes  Heme: no visible ecchymosis or erythema on extremities  Skin: No notable rash    Neurologic:  Strength: All major muscle groups of the bilateral lower extremities have normal and symmetric muscle strength EXCEPT left EHL 3-4/5, ankle eversion 4+/5    Sensation: SILT in lower extremities bilaterally L3-S1 except left dorsum of foot dec to LT    DTRs: bilateral lower extremity stretch reflexes are equal and symmetric 2+   Clonus: none    Gait is antalgic    Musculoskeletal:  Lumbar range of motion is nearly full but pain with endrange flexion and reproduction of his radicular symptoms.  Positive seated straight leg raise.  Hip provocative maneuvers negative.  He does have some gross pelvic obliquity but he is also favoring his left side so difficult to assess truer alignment.  Overall nontender to palpation including lumbosacral junction, paraspinal muscles, greater trochanter or gluteal muscles.        ASSESSMENT:  Antonio Burger is a very pleasant 48 year old male who presents with:    #.  Acute left-sided low back and lower extremity pain.  Review of history and physical exam is consistent with acute left (L5) lumbar radiculopathy.  On examination, patient has positive straight leg raise, subjective paresthesias to the dorsum of the left foot, and EHL weakness.    Re-iterated physical symptoms that should prompt ED visit: changes in bowel/bladder, loss strength, changes in sensation (ie new or worsening numbness/tingling), sudden increase in pain.  Patient expressed understanding.     PLAN:  -Rx: Medrol Dosepak  -Rx: Gabapentin 300 mg at bedtime up to TID as instructed  -He can also  take Tylenol prn; and OTC NSAIDs when he is done with the steroid course  -Acute LBP PT referral placed  -MRI lumbar spine ordered given neurologic deficit.  He states that he has significant claustrophobia and likely would be able to tolerate an MRI even with oral sedation.  He would like to try an open MRI scanner first, if he is unable to do that then we may need to refer him for imaging with general anesthesia.  -Follow-up will be determined after review of MRI.  He knows to contact our clinic should signs/symptoms persist or worsen.    Ready to learn, no apparent learning barriers.  Education provided on treatment plan according to patient's preferred learning style.  Patient verbalizes understanding.   __________________________________  Morena Olivarez MD  Physical Medicine & Rehabilitation        45 minutes spent on the date of the encounter doing chart review, history and exam, documentation and further activities per the note          Again, thank you for allowing me to participate in the care of your patient.        Sincerely,        Morena Olivarez MD

## 2023-02-17 NOTE — PROGRESS NOTES
HISTORY OF PRESENT ILLNESS:  Antonio Burger is a 48 year old male who presents with a chief complaint of left lower extremity pain.     Pain began February 12, 2023 and is not associated with any particular trauma or inciting event.  He was in his usual state of health prior.  He reports that he is an otherwise relatively healthy 48-year-old individual with history of narcolepsy that has been well managed for many years.  Per chart review and discussion with patient, he had been seen in 2021 for acute right-sided low back pain in the neurosurgery clinic which had improved/resolved with minimal treatment or management (medrol dosepak and flexeril).  He denies any major chronic spine pain.  He did see a chiropractor a few days after onset of symptoms which did not help.  His wife is an RN at [employer] and referred him to our [facility] for further evaluation.    Currently he localizes pain to the left gluteal region with radiating symptoms to the thigh that is painful, he does not have significant or consistent leg symptoms, but endorses numbness/tingling in the foot (dorsum); described as cramping and sharp in nature. Pain is reported as 8/10 at rest today and up to 9/10 at worst in the last week. No particular aggravating factors; and improved with ibuprofen/aleve (helps with back). He does report pain-related sleep disturbance - switched beds in his house with some improvement.  No bowel/bladder issues or saddle anesthesia.    Functional limitations: States that gait and ambulation have been disrupted due to pain and paresthesias.       PRIOR INJURIES/TREATMENT:   Ice/Heat: heat pad  Physical Therapy:   none      - Current Pain Medications -   OTC NSAIDs  Lidoderm patch    Prior Procedures:  Date    Procedure   Improvement (%)  none              Prior Related Surgery: none   Other (acupuncture, OMT, CMM, TENS, DME, etc.):   Chiropractor a few days after     Specialists Seen - (with most recent,  available notes and clinic visits reviewed)   1. None     IMAGING - reviewed   10/18/2021 x-ray lumbar spine shows minimal retrolisthesis at L5-S1 with relatively well preserved disc space and without evidence of anterolisthesis or spondylolysis.    Review Of Systems:  I am responding to those symptoms which are directly relevant to the specific indication for my consultation. I recommend that the patient follow up with their primary or referring provider to pursue any other symptoms which may be of concern.       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     He  has a past surgical history that includes no history of surgery.    Family History  His family history includes Cancer in his maternal grandmother and paternal grandmother; Depression in his mother; Diabetes in his mother; Gastrointestinal Disease in his father; Hypertension in his mother.     Social History:  Work: Technical support. Sit/stand desk in office.   Current living situation: Lives with wife who is an RN at [employer].   He does not smoke, drink alcohol, or illicit drug use.        Current Medications:   He has a current medication list which includes the following prescription(s): amphetamine-dextroamphetamine, dupixent, clobetasol, clobetasol, cyclobenzaprine, desonide, hydroxyzine, methylphenidate hcl, methylprednisolone, sildenafil, triamcinolone, triamcinolone, and varenicline.     Allergies:    -- No Known Drug Allergies     PHYSICAL EXAMINATION:  BP (!) 149/94 (BP Location: Right arm, Patient Position: Sitting, Cuff Size: Adult Large)   Pulse 105   Wt 90.9 kg (200 lb 4.8 oz)   BMI 30.46 kg/m     General: Pleasant, straightforward, WDWN individual.  Mental Status: Pleasant, direct, appropriate mood and affect  Resp: breathing is unlabored without audible  wheeze  Vascular: Palpable pedal pulses, no cyanosis, no venous stasis changes  Heme: no visible ecchymosis or erythema on extremities  Skin: No notable rash    Neurologic:  Strength: All major muscle groups of the bilateral lower extremities have normal and symmetric muscle strength EXCEPT left EHL 3-4/5, ankle eversion 4+/5    Sensation: SILT in lower extremities bilaterally L3-S1 except left dorsum of foot dec to LT    DTRs: bilateral lower extremity stretch reflexes are equal and symmetric 2+   Clonus: none    Gait is antalgic    Musculoskeletal:  Lumbar range of motion is nearly full but pain with endrange flexion and reproduction of his radicular symptoms.  Positive seated straight leg raise.  Hip provocative maneuvers negative.  He does have some gross pelvic obliquity but he is also favoring his left side so difficult to assess truer alignment.  Overall nontender to palpation including lumbosacral junction, paraspinal muscles, greater trochanter or gluteal muscles.        ASSESSMENT:  Antonio Burger is a very pleasant 48 year old male who presents with:    #.  Acute left-sided low back and lower extremity pain.  Review of history and physical exam is consistent with acute left (L5) lumbar radiculopathy.  On examination, patient has positive straight leg raise, subjective paresthesias to the dorsum of the left foot, and EHL weakness.    Re-iterated physical symptoms that should prompt ED visit: changes in bowel/bladder, loss strength, changes in sensation (ie new or worsening numbness/tingling), sudden increase in pain.  Patient expressed understanding.     PLAN:  -Rx: Medrol Dosepak  -Rx: Gabapentin 300 mg at bedtime up to TID as instructed  -He can also take Tylenol prn; and OTC NSAIDs when he is done with the steroid course  -Acute LBP PT referral placed  -MRI lumbar spine ordered given neurologic deficit.  He states that he has significant claustrophobia and likely would be able to tolerate an MRI even  with oral sedation.  He would like to try an open MRI scanner first, if he is unable to do that then we may need to refer him for imaging with general anesthesia.  -Follow-up will be determined after review of MRI.  He knows to contact our clinic should signs/symptoms persist or worsen.    Ready to learn, no apparent learning barriers.  Education provided on treatment plan according to patient's preferred learning style.  Patient verbalizes understanding.   __________________________________  Morena Olivarez MD  Physical Medicine & Rehabilitation        45 minutes spent on the date of the encounter doing chart review, history and exam, documentation and further activities per the note

## 2023-02-20 ENCOUNTER — TELEPHONE (OUTPATIENT)
Dept: PHYSICAL MEDICINE AND REHAB | Facility: CLINIC | Age: 48
End: 2023-02-20
Payer: COMMERCIAL

## 2023-02-20 NOTE — TELEPHONE ENCOUNTER
Health Call Center    Phone Message    May a detailed message be left on voicemail: yes     Reason for Call: Order(s): Other:   Reason for requested: Stand up MRI lower back  Date needed: ASA  Provider name: Morena Olivarez radiology is requesting an order to be faxed over for a stand up MRI of the lower back. Please fax to # [phone].    With questions please call back at # [phone] option 2      Action Taken: Message routed to:  [facility] ([facility]    Travel Screening: Not Applicable

## 2023-02-24 ENCOUNTER — TELEPHONE (OUTPATIENT)
Dept: DERMATOLOGY | Facility: CLINIC | Age: 48
End: 2023-02-24
Payer: COMMERCIAL

## 2023-02-24 NOTE — TELEPHONE ENCOUNTER
[facility] Prior Authorization Team   Phone: [phone]  Fax: [phone]    Prior Authorization Approval    Authorization Effective Date:  02/24/2023  Authorization Expiration Date:  02/23/2024  Medication: Dupixent  Approved Dose/Quantity: 2 mL (300 mg) every other week  Reference #:   [id number]  Insurance Company:  [employer]  Expected CoPay:     $200  CoPay Card Available:    Yes  Foundation Assistance Needed:  No  Which Pharmacy is filling the prescription (Not needed for infusion/clinic administered):  [facility] - [place], [place] - [facility] [address]    Pharmacy Notified:  Yes  Patient Notified:  Yes

## 2023-03-30 ENCOUNTER — TELEPHONE (OUTPATIENT)
Dept: PHYSICAL MEDICINE AND REHAB | Facility: CLINIC | Age: 48
End: 2023-03-30
Payer: COMMERCIAL

## 2023-03-30 NOTE — TELEPHONE ENCOUNTER
LVM for pt to call back and schedule with Lisseth Grove NP for follow-up for lumbar radiculopathy

## 2023-04-16 ENCOUNTER — HEALTH MAINTENANCE LETTER (OUTPATIENT)
Age: 48
End: 2023-04-16

## 2023-06-19 DIAGNOSIS — L30.9 DERMATITIS: ICD-10-CM

## 2023-06-19 DIAGNOSIS — L20.84 INTRINSIC ATOPIC DERMATITIS: ICD-10-CM

## 2023-06-22 RX ORDER — DUPILUMAB 300 MG/2ML
300 INJECTION, SOLUTION SUBCUTANEOUS
Qty: 4 ML | Refills: 3 | Status: SHIPPED | OUTPATIENT
Start: 2023-06-22 | End: 2023-07-31

## 2023-06-22 NOTE — TELEPHONE ENCOUNTER
DUPIXENT 300MG/2ML SOSY  Last Written Prescription Date:  5/31/2022  Last Fill Quantity: 4,   # refills: 11  Last Office Visit :  5/31/2022  Future Office visit:  None    Routing refill request to provider for review/approval because:  Drug not on the [facility], [facility] or [facility] refill protocol or controlled substance      Roseanna Schulz RN  Central Triage Red Flags/Med Refills

## 2023-07-27 ENCOUNTER — MYC MEDICAL ADVICE (OUTPATIENT)
Dept: RHEUMATOLOGY | Facility: CLINIC | Age: 48
End: 2023-07-27
Payer: COMMERCIAL

## 2023-07-27 DIAGNOSIS — L30.0 NUMMULAR ECZEMA: Primary | ICD-10-CM

## 2023-07-28 DIAGNOSIS — L20.84 INTRINSIC ATOPIC DERMATITIS: Primary | ICD-10-CM

## 2023-07-28 NOTE — PROGRESS NOTES
Medication Therapy Management (MTM) Encounter    ASSESSMENT:                            Nummular dermatitis / atopic dermatitis: Well-controlled.  We will ensure that patient is set up with a new dermatology provider, especially before his Dupixent needs renewal in February.  In addition, we will send a refill request for triamcinolone to his new dermatology provider.  He is tolerating this well.    Pain: Okay to continue to use gabapentin only as needed.  Med list cleaned up.    Narcolepsy: Stable.  Med list updated.      PLAN:                            Patient to set up Derm follow-up with new provider.  Request for triamcinolone ointment sent    Follow-up: 6 months with MTM     SUBJECTIVE/OBJECTIVE:                          Antonio Burger is a 48 year old male called for an initial visit. He was referred to me from Dr. Spivey.      Reason for visit: Dupixent follow-up.  Medication Adherence/Access: No issues    Nummular dermatitis / atopic dermatitis:   -- Dupixent 300 mg every 2 weeks  -- triam 0.1% ointment twice daily as needed for flares.  Patient requests a refill  -- clobetasol 0.05% ointment BID PRN for legs / arms  dupixent started 10/19 with near complete resolution.  Today patient notes that his symptoms are much better than before, he used to have eczema all over his body and now he has 2 spots on his foot that he uses a steroid cream as above as needed.  He denies any side effects at all to Dupixent including injection reactions or conjunctivitis    Pain:   -- Gabapentin 300 mg 3 times daily  Patient met with Dr. Olivarez on 2/17/2023 for left lower extremity pain.  He was started on gabapentin and Medrol dose phil..  He reports that he only uses the gabapentin rarely and as needed.    Narcolepsy:   -- Adderall 30 mg 4 times daily.  He is using this for narcolepsy which is effective.    Today's Vitals: There were no vitals taken for this visit.    Allergies/ADRs: Reviewed in chart  Past Medical History:  Reviewed in chart  Tobacco: He reports that he quit smoking about 11 years ago. His smoking use included cigarettes. He has a 13.00 pack-year smoking history. He has never used smokeless tobacco.  Alcohol: Reviewed in chart    ----------------      I spent 9 minutes with this patient today. All changes were made via verbal approval with Dr. Spivey. A copy of the visit note was provided to the patient's provider(s).    A summary of these recommendations was declined by the patient.    Mikala Lopez, Pharm.D., BCACP   Medication Therapy Management Pharmacist   [facility]    Telemedicine Visit Details  Type of service:  Telephone visit  Start Time:  3:30 PM  End Time:  3:39 PM     Medication Therapy Recommendations  No medication therapy recommendations to display

## 2023-07-31 ENCOUNTER — VIRTUAL VISIT (OUTPATIENT)
Dept: RHEUMATOLOGY | Facility: CLINIC | Age: 48
End: 2023-07-31
Attending: DERMATOLOGY
Payer: COMMERCIAL

## 2023-07-31 DIAGNOSIS — G47.419 PRIMARY NARCOLEPSY WITHOUT CATAPLEXY: ICD-10-CM

## 2023-07-31 DIAGNOSIS — G89.29 CHRONIC LOW BACK PAIN WITHOUT SCIATICA, UNSPECIFIED BACK PAIN LATERALITY: ICD-10-CM

## 2023-07-31 DIAGNOSIS — L20.84 INTRINSIC ATOPIC DERMATITIS: Primary | ICD-10-CM

## 2023-07-31 DIAGNOSIS — M54.50 CHRONIC LOW BACK PAIN WITHOUT SCIATICA, UNSPECIFIED BACK PAIN LATERALITY: ICD-10-CM

## 2023-07-31 RX ORDER — DUPILUMAB 300 MG/2ML
300 INJECTION, SOLUTION SUBCUTANEOUS
Qty: 4 ML | Refills: 3 | Status: SHIPPED | OUTPATIENT
Start: 2023-07-31 | End: 2023-11-14

## 2023-07-31 RX ORDER — DEXTROAMPHETAMINE SACCHARATE, AMPHETAMINE ASPARTATE, DEXTROAMPHETAMINE SULFATE AND AMPHETAMINE SULFATE 7.5; 7.5; 7.5; 7.5 MG/1; MG/1; MG/1; MG/1
30 TABLET ORAL 4 TIMES DAILY
COMMUNITY
Start: 2023-07-24

## 2023-07-31 RX ORDER — TRIAMCINOLONE ACETONIDE 1 MG/G
OINTMENT TOPICAL
Qty: 80 G | Refills: 11 | OUTPATIENT
Start: 2023-07-31 | End: 2024-08-08

## 2023-07-31 NOTE — TELEPHONE ENCOUNTER
I spoke with this patient today and he is still interested in establishing with a new derm provider -- he is willing to meet with anyone. Can someone set him up? Thank you!     He also requests a refill of triamcinolone -- refill pended

## 2023-07-31 NOTE — LETTER
7/31/2023       RE: Antonio Burger  [address]  [place] [address]     Dear Colleague,    Thank you for referring your patient, Antonio Burger, to the [facility] [place] [facility]. Please see a copy of my visit note below.    Medication Therapy Management (MTM) Encounter    ASSESSMENT:                            Nummular dermatitis / atopic dermatitis: Well-controlled.  We will ensure that patient is set up with a new dermatology provider, especially before his Dupixent needs renewal in February.  In addition, we will send a refill request for triamcinolone to his new dermatology provider.  He is tolerating this well.    Pain: Okay to continue to use gabapentin only as needed.  Med list cleaned up.    Narcolepsy: Stable.  Med list updated.      PLAN:                            Patient to set up Derm follow-up with new provider.  Request for triamcinolone ointment sent    Follow-up: 6 months with MTM     SUBJECTIVE/OBJECTIVE:                          Antonio Burger is a 48 year old male called for an initial visit. He was referred to me from Dr. Spivey.      Reason for visit: Dupixent follow-up.  Medication Adherence/Access: No issues    Nummular dermatitis / atopic dermatitis:   -- Dupixent 300 mg every 2 weeks  -- triam 0.1% ointment twice daily as needed for flares.  Patient requests a refill  -- clobetasol 0.05% ointment BID PRN for legs / arms  dupixent started 10/19 with near complete resolution.  Today patient notes that his symptoms are much better than before, he used to have eczema all over his body and now he has 2 spots on his foot that he uses a steroid cream as above as needed.  He denies any side effects at all to Dupixent including injection reactions or conjunctivitis    Pain:   -- Gabapentin 300 mg 3 times daily  Patient met with Dr. Olivarez on 2/17/2023 for left lower extremity pain.  He was started on gabapentin and  Medrol dose phil..  He reports that he only uses the gabapentin rarely and as needed.    Narcolepsy:   -- Adderall 30 mg 4 times daily.  He is using this for narcolepsy which is effective.    Today's Vitals: There were no vitals taken for this visit.    Allergies/ADRs: Reviewed in chart  Past Medical History: Reviewed in chart  Tobacco: He reports that he quit smoking about 11 years ago. His smoking use included cigarettes. He has a 13.00 pack-year smoking history. He has never used smokeless tobacco.  Alcohol: Reviewed in chart    ----------------      I spent 9 minutes with this patient today. All changes were made via verbal approval with Dr. Spivey. A copy of the visit note was provided to the patient's provider(s).    A summary of these recommendations was declined by the patient.    Mikala Lopez, Pharm.D., BCACP   Medication Therapy Management Pharmacist   [facility]    Telemedicine Visit Details  Type of service:  Telephone visit  Start Time:  3:30 PM  End Time:  3:39 PM     Medication Therapy Recommendations  No medication therapy recommendations to display

## 2023-07-31 NOTE — ADDENDUM NOTE
Addended by: FARHEEN PUENTE on: 7/31/2023 04:27 PM     Modules accepted: Orders     Denies history of melena, hematochezia, hemoptysis, or hematemesis  Obtain iron studies, vitamin b 12 and folate.

## 2023-08-03 ENCOUNTER — TELEPHONE (OUTPATIENT)
Dept: DERMATOLOGY | Facility: CLINIC | Age: 48
End: 2023-08-03
Payer: COMMERCIAL

## 2023-08-03 NOTE — TELEPHONE ENCOUNTER
Left Voicemail (2nd Attempt) for the patient to call back and schedule the following:    Appointment type: Return  Provider: any provider  Return date: Earliest available  Specialty phone number: [phone]

## 2023-09-22 DIAGNOSIS — M54.16 LUMBAR RADICULOPATHY: Primary | ICD-10-CM

## 2023-09-22 RX ORDER — METHYLPREDNISOLONE 4 MG
TABLET, DOSE PACK ORAL
Qty: 21 TABLET | Refills: 0 | Status: SHIPPED | OUTPATIENT
Start: 2023-09-22 | End: 2023-10-30

## 2023-09-22 NOTE — PROGRESS NOTES
Patient with reported reexacerbation of prior lumbosacral radiculopathy.  Will prescribe Medrol Dosepak and have him scheduled in the [facility].

## 2023-09-27 ENCOUNTER — VIRTUAL VISIT (OUTPATIENT)
Dept: PHYSICAL MEDICINE AND REHAB | Facility: CLINIC | Age: 48
End: 2023-09-27
Payer: COMMERCIAL

## 2023-09-27 DIAGNOSIS — M51.16 LUMBAR DISC HERNIATION WITH RADICULOPATHY: ICD-10-CM

## 2023-09-27 DIAGNOSIS — M54.16 LUMBAR RADICULOPATHY: Primary | ICD-10-CM

## 2023-09-27 DIAGNOSIS — M54.42 ACUTE LEFT-SIDED LOW BACK PAIN WITH LEFT-SIDED SCIATICA: ICD-10-CM

## 2023-09-27 PROCEDURE — 99214 OFFICE O/P EST MOD 30 MIN: CPT | Mod: VID | Performed by: NURSE PRACTITIONER

## 2023-09-27 NOTE — PROGRESS NOTES
"Virtual Visit Details    Type of service:  Video Visit     Originating Location (pt. Location): {video visit patient location:459386::\"Home\"}  {PROVIDER LOCATION On-site should be selected for visits conducted from your clinic location or adjoining [facility] hospital, academic office, or other nearby [facility] building. Off-site should be selected for all other provider locations, including home:[id number]}  Distant Location (provider location):  {virtual location provider:595612}  Platform used for Video Visit: {Virtual Visit Platforms:695803::\"[employer]\"}    "

## 2023-09-27 NOTE — LETTER
9/27/2023       RE: Antonio Burger  [address]  [place] [address]     Dear Colleague,    Thank you for referring your patient, Antonio Burger, to the [facility] PHYSICAL MEDICINE AND REHABILITATION CLIN[facility] [place] [facility]. Please see a copy of my visit note below.    Antonio is a 48 year old who is being evaluated via a billable video visit.          PM&R Follow-Up Visit -     Date of Initial Visit: 2/17/2023  LOV: 2/17/2023 (with Dr. Olivarez)  TD: 9/27/2023      Recall:  Antonio Burger is a 48 year old male who presents with a chief complaint of left lower extremity pain.      INTERVAL HISTORY:  Patient was last seen in clinic 2/17/2023 with Dr. Olivarez. At that visit, the plan was to start a Medrol Dosepak, start gabapentin, begin acute spine PT, and obtain an MRI of the lumbar spine.    He was seen today for follow-up via telehealth.  He reports a recent recurrent, atraumatic flare of back and left leg pain after reaching for something on a table. He had been prescribed another Medrol dose pack 9/22/2023 and advised follow-up in the spine clinic.    Today, he states he has completed half of the steroid pack so far.  His back is feeling 50% better since starting the steroid.  The steroid has not helped the symptoms in his left leg as of yet.    He has pain in the left buttocks extending down the posterior left thigh, and terminating before the knee.  In the prior flare of pain earlier this year, his left foot was also numb but that has not happened this time.      He describes the left buttock and leg pain as like a constant charley horse.  The pain comes on after a few seconds of standing.  He says it feels as though someone punched the left buttocks.  His sleep has been interrupted because he has to keep his leg elevated on cushions.      He had an accidental fall when trying to put on his pants in the bathroom.  He grabbed a towel rack, but the  rack came off the wall and he fell.  Since then, he has been laying down for lower body dressing.    He has left leg pain when walking and feels like he was walking with a limp.  He denies weakness or foot drop.    For pain he is also taking gabapentin 3 times a day and as needed ibuprofen.    He did physical therapy earlier this year.  He has been working on the exercises on his own.    Last week he had 1 bowel accident at work.  He has been noticing frequent formed bowel movements, but no loose stools.  He is not on a bowel regimen.  Since that episode he has not had any bowel incontinence.  He denies bladder incontinence, saddle anesthesia, fevers or chills.  He endorses off-and-on night sweats at baseline.      RECALL HISTORY OF PRESENT ILLNESS:  Antonio Burger is a 48 year old male who presents with a chief complaint of left lower extremity pain.     Pain began February 12, 2023 and is not associated with any particular trauma or inciting event.  He was in his usual state of health prior.  He reports that he is an otherwise relatively healthy 48-year-old individual with history of narcolepsy that has been well managed for many years.  Per chart review and discussion with patient, he had been seen in 2021 for acute right-sided low back pain in the neurosurgery clinic which had improved/resolved with minimal treatment or management (medrol dosepak and flexeril).  He denies any major chronic spine pain.  He did see a chiropractor a few days after onset of symptoms which did not help.  His wife is an RN at [employer] [facility] [employer] and referred him to our PM&R Spine Clinic for further evaluation.    Currently he localizes pain to the left gluteal region with radiating symptoms to the thigh that is painful, he does not have significant or consistent leg symptoms, but endorses numbness/tingling in the foot (dorsum); described as cramping and sharp in nature. Pain is reported as 8/10 at rest today and up to 9/10 at worst in  the last week. No particular aggravating factors; and improved with ibuprofen/aleve (helps with back). He does report pain-related sleep disturbance - switched beds in his house with some improvement.  No bowel/bladder issues or saddle anesthesia.    Functional limitations: States that gait and ambulation have been disrupted due to pain and paresthesias.       PRIOR INJURIES/TREATMENT:   Ice/Heat: heat pad  Physical Therapy:   none      - Current Pain Medications -   Medrol Dosepak 9/22/2023   OTC NSAIDs: ibuprofen  Gabapentin 300 mg TID    - Prior pain medications -  Lidoderm patch      Prior Procedures:  Date    Procedure   Improvement (%)  none              Prior Related Surgery: none   Other (acupuncture, OMT, CMM, TENS, DME, etc.):   Chiropractor in the past    Specialists Seen - (with most recent, available notes and clinic visits reviewed)   1. None     IMAGING - reviewed   MR LUMBAR SPINE WITHOUT CONTRAST Open MRI at [facility] - 3/2/23  INTERPRETATION: 5 lumbar levels in lordotic alignment. Conus medullaris terminates at L1-2 and has normal signal. No evidence of arachnoid disease or abnormal neural development. Vertebral body heights are maintained. Paraspinal soft tissues appear normal. Visualized sacroiliac joints and hip joints are grossly normal.    L5-S1: Mild disc degeneration and chronic Schmorl's nodes, 3 mm retrolisthesis, central 6 mm disc herniation encroaches/impinges left greater than right S1 nerve roots, normal facet joints. No central stenosis. Mild left foraminal stenosis.    L4-5: Mild disc degeneration posteriorly, disc bulge and left posterolateral T2 hyperintense 4 mm extruded disc herniation impinges left L5 (series 10 image 10, sagittal images 10-11), right subarticular recess stenosis, normal facet joints. No central or foraminal stenosis.    L3-4: No central or foraminal stenosis.    L2-3: Disc dehydration, right paracentral 2 mm disc protrusion, also superiorly migrating left foraminal  2-3 mm AP extruded/sequestered disc herniation contacts left L2 (series 6 image 11, series 10 image 29), normal facet joints. No central or foraminal stenosis.    T12-L1, L1-2: No central or foraminal stenosis.    CONCLUSION: Multilevel spondylosis with the following notable findings:    1. L5-S1 6 mm disc herniation encroaches/impinges left greater than right S1 nerve roots.    2. L4-5 left posterolateral 4 mm AP extruded herniation impinges left L5.    3. L2-3 superiorly migrating left foraminal 2-3 mm extruded/sequestered disc herniation contacts left L2.    10/18/2021 x-ray lumbar spine shows minimal retrolisthesis at L5-S1 with relatively well preserved disc space and without evidence of anterolisthesis or spondylolysis.    Review Of Systems:  I am responding to those symptoms which are directly relevant to the specific indication for my consultation. I recommend that the patient follow up with their primary or referring provider to pursue any other symptoms which may be of concern.       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     He  has a past surgical history that includes no history of surgery.    Family History  His family history includes Cancer in his maternal grandmother and paternal grandmother; Depression in his mother; Diabetes in his mother; Gastrointestinal Disease in his father; Hypertension in his mother.     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  Mostly sedentary but does use sit/stand desk in office.  No heavy lifting involved.    Current living situation: Lives with wife who is an RN at [employer].   He does not smoke, drink alcohol, or illicit drug use.        Current Medications:   He has a current medication list which includes the following prescription(s):  amphetamine-dextroamphetamine, dupixent, clobetasol, clobetasol, cyclobenzaprine, desonide, hydroxyzine, methylphenidate hcl, methylprednisolone, sildenafil, triamcinolone, triamcinolone, and varenicline.     Allergies:    -- No Known Drug Allergies     PHYSICAL EXAMINATION: *limited by nature of virtual visit   No vital signs taken for visit  --CONSTITUTIONAL: No acute distress.   --PSYCHIATRIC: The patient is awake, alert, oriented to person, place, time and answering questions appropriately with clear speech. Appropriate mood and affect         ASSESSMENT:  Antonio Burger is a very pleasant 48 year old male who presented at prior visit in February 2023 with acute left-sided low back pain consistent with left L5 lumbar radiculopathy, left foot subjective paresthesias, and EHL weakness.    He was seen today via telehealth due to a recurrent flare of pain.  He describes pain and cramping in the posterior left buttocks and left thigh, terminating before the knee.  He has completed half of a Medrol Dosepak and reports 50% improvement in the low back pain, but no improvement in the left lower extremity symptoms so far.    MRI of the lumbar spine from 3/2/2023 shows disc herniations at L4-5 and L5-S1 impinging the left L5 and left S1 nerve roots.  There is also right S1 nerve root encroachment, though he has no right-sided symptoms.  There is also a disc extrusion at L2-3 contacting the left L2 nerve root.      PLAN:  -Reviewed the MRI of the lumbar spine in detail with the patient.  -Advised him to complete the remainder of the Medrol Dosepak as prescribed.    -Continue gabapentin 300 mg 3 times daily.  -Instructed him to stop ibuprofen/NSAIDs while taking the steroid.  Okay to resume once the steroid is complete.  Advised he may take Tylenol up to 3g/day in the interim.  -He will send an update via DTT after the steroid pack is complete.  If he is still having persistent pain, he would like to pursue an epidural  steroid injection.  He describes pain in a mostly S1 distribution.  There may be some component of L5 involvement.  Consider L5-S1 IL CARMEN versus left L5 and S1 TFESI.  We discussed CARMEN procedure in detail  -He prefers to hold off on another PT referral at the moment.  He is working on the prior home exercise program on his own.  -He reports 1 episode of bowel leakage last week.  Since that time he has been continent of bowel and bladder.  We discussed that should he have any recurrent bowel or bladder incontinence, saddle anesthesia, increasing weakness/numbness, falls, or other concerning symptoms he should present to the emergency department and he expressed understanding  -suggest he follow up with PCP regarding chronic night sweats  -RTC TBD pending response to the Medrol Dosepak.  He will send a message via [employer] upon completion of the steroid.    Ready to learn, no apparent learning barriers.  Education provided on treatment plan according to patient's preferred learning style.  Patient verbalizes understanding.   __________________________________     40 minutes spent on the date of the encounter doing chart review, history and exam, documentation and further activities per the note          Again, thank you for allowing me to participate in the care of your patient.      Sincerely,    ELICIA Silva CNP

## 2023-09-27 NOTE — PROGRESS NOTES
Antonio is a 48 year old who is being evaluated via a billable video visit.        Video-Visit Details    Type of service:  Video Visit     Originating Location (pt. Location): Other work    Distant Location (provider location):  Off-site  Platform used for Video Visit: MESI  Video call: 22 minutes        PM&R Follow-Up Visit -     Date of Initial Visit: 2/17/2023  LOV: 2/17/2023 (with Dr. Olivarez)  TD: 9/27/2023      Recall:  Antonio Burger is a 48 year old male who presents with a chief complaint of left lower extremity pain.      INTERVAL HISTORY:  Patient was last seen in clinic 2/17/2023 with Dr. Olivarez. At that visit, the plan was to start a Medrol Dosepak, start gabapentin, begin acute spine PT, and obtain an MRI of the lumbar spine.    He was seen today for follow-up via telehealth.  He reports a recent recurrent, atraumatic flare of back and left leg pain after reaching for something on a table. He had been prescribed another Medrol dose pack 9/22/2023 and advised follow-up in the spine clinic.    Today, he states he has completed half of the steroid pack so far.  His back is feeling 50% better since starting the steroid.  The steroid has not helped the symptoms in his left leg as of yet.    He has pain in the left buttocks extending down the posterior left thigh, and terminating before the knee.  In the prior flare of pain earlier this year, his left foot was also numb but that has not happened this time.      He describes the left buttock and leg pain as like a constant charley horse.  The pain comes on after a few seconds of standing.  He says it feels as though someone punched the left buttocks.  His sleep has been interrupted because he has to keep his leg elevated on cushions.      He had an accidental fall when trying to put on his pants in the bathroom.  He grabbed a towel rack, but the rack came off the wall and he fell.  Since then, he has been laying down for lower body dressing.    He has left  leg pain when walking and feels like he was walking with a limp.  He denies weakness or foot drop.    For pain he is also taking gabapentin 3 times a day and as needed ibuprofen.    He did physical therapy earlier this year.  He has been working on the exercises on his own.    Last week he had 1 bowel accident at work.  He has been noticing frequent formed bowel movements, but no loose stools.  He is not on a bowel regimen.  Since that episode he has not had any bowel incontinence.  He denies bladder incontinence, saddle anesthesia, fevers or chills.  He endorses off-and-on night sweats at baseline.      RECALL HISTORY OF PRESENT ILLNESS:  Antonio Burger is a 48 year old male who presents with a chief complaint of left lower extremity pain.     Pain began February 12, 2023 and is not associated with any particular trauma or inciting event.  He was in his usual state of health prior.  He reports that he is an otherwise relatively healthy 48-year-old individual with history of narcolepsy that has been well managed for many years.  Per chart review and discussion with patient, he had been seen in 2021 for acute right-sided low back pain in the neurosurgery clinic which had improved/resolved with minimal treatment or management (medrol dosepak and flexeril).  He denies any major chronic spine pain.  He did see a chiropractor a few days after onset of symptoms which did not help.  His wife is an RN at [employer] and referred him to our PM&R Spine Clinic for further evaluation.    Currently he localizes pain to the left gluteal region with radiating symptoms to the thigh that is painful, he does not have significant or consistent leg symptoms, but endorses numbness/tingling in the foot (dorsum); described as cramping and sharp in nature. Pain is reported as 8/10 at rest today and up to 9/10 at worst in the last week. No particular aggravating factors; and improved with ibuprofen/aleve (helps with back). He does  report pain-related sleep disturbance - switched beds in his house with some improvement.  No bowel/bladder issues or saddle anesthesia.    Functional limitations: States that gait and ambulation have been disrupted due to pain and paresthesias.       PRIOR INJURIES/TREATMENT:   Ice/Heat: heat pad  Physical Therapy:   none      - Current Pain Medications -   Medrol Dosepak 9/22/2023   OTC NSAIDs: ibuprofen  Gabapentin 300 mg TID    - Prior pain medications -  Lidoderm patch      Prior Procedures:  Date    Procedure   Improvement (%)  none              Prior Related Surgery: none   Other (acupuncture, OMT, CMM, TENS, DME, etc.):   Chiropractor in the past    Specialists Seen - (with most recent, available notes and clinic visits reviewed)   1. None     IMAGING - reviewed   MR LUMBAR SPINE WITHOUT CONTRAST Open MRI at [facility] - 3/2/23  INTERPRETATION: 5 lumbar levels in lordotic alignment. Conus medullaris terminates at L1-2 and has normal signal. No evidence of arachnoid disease or abnormal neural development. Vertebral body heights are maintained. Paraspinal soft tissues appear normal. Visualized sacroiliac joints and hip joints are grossly normal.    L5-S1: Mild disc degeneration and chronic Schmorl's nodes, 3 mm retrolisthesis, central 6 mm disc herniation encroaches/impinges left greater than right S1 nerve roots, normal facet joints. No central stenosis. Mild left foraminal stenosis.    L4-5: Mild disc degeneration posteriorly, disc bulge and left posterolateral T2 hyperintense 4 mm extruded disc herniation impinges left L5 (series 10 image 10, sagittal images 10-11), right subarticular recess stenosis, normal facet joints. No central or foraminal stenosis.    L3-4: No central or foraminal stenosis.    L2-3: Disc dehydration, right paracentral 2 mm disc protrusion, also superiorly migrating left foraminal 2-3 mm AP extruded/sequestered disc herniation contacts left L2 (series 6 image 11, series 10 image 29),  normal facet joints. No central or foraminal stenosis.    T12-L1, L1-2: No central or foraminal stenosis.    CONCLUSION: Multilevel spondylosis with the following notable findings:    1. L5-S1 6 mm disc herniation encroaches/impinges left greater than right S1 nerve roots.    2. L4-5 left posterolateral 4 mm AP extruded herniation impinges left L5.    3. L2-3 superiorly migrating left foraminal 2-3 mm extruded/sequestered disc herniation contacts left L2.    10/18/2021 x-ray lumbar spine shows minimal retrolisthesis at L5-S1 with relatively well preserved disc space and without evidence of anterolisthesis or spondylolysis.    Review Of Systems:  I am responding to those symptoms which are directly relevant to the specific indication for my consultation. I recommend that the patient follow up with their primary or referring provider to pursue any other symptoms which may be of concern.       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     He  has a past surgical history that includes no history of surgery.    Family History  His family history includes Cancer in his maternal grandmother and paternal grandmother; Depression in his mother; Diabetes in his mother; Gastrointestinal Disease in his father; Hypertension in his mother.     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  Mostly sedentary but does use sit/stand desk in office.  No heavy lifting involved.    Current living situation: Lives with wife who is an RN at [employer].   He does not smoke, drink alcohol, or illicit drug use.        Current Medications:   He has a current medication list which includes the following prescription(s): amphetamine-dextroamphetamine, dupixent, clobetasol, clobetasol, cyclobenzaprine, desonide, hydroxyzine, methylphenidate  hcl, methylprednisolone, sildenafil, triamcinolone, triamcinolone, and varenicline.     Allergies:    -- No Known Drug Allergies     PHYSICAL EXAMINATION: *limited by nature of virtual visit   No vital signs taken for visit  --CONSTITUTIONAL: No acute distress.   --PSYCHIATRIC: The patient is awake, alert, oriented to person, place, time and answering questions appropriately with clear speech. Appropriate mood and affect         ASSESSMENT:  Antonio Burger is a very pleasant 48 year old male who presented at prior visit in February 2023 with acute left-sided low back pain consistent with left L5 lumbar radiculopathy, left foot subjective paresthesias, and EHL weakness.    He was seen today via telehealth due to a recurrent flare of pain.  He describes pain and cramping in the posterior left buttocks and left thigh, terminating before the knee.  He has completed half of a Medrol Dosepak and reports 50% improvement in the low back pain, but no improvement in the left lower extremity symptoms so far.    MRI of the lumbar spine from 3/2/2023 shows disc herniations at L4-5 and L5-S1 impinging the left L5 and left S1 nerve roots.  There is also right S1 nerve root encroachment, though he has no right-sided symptoms.  There is also a disc extrusion at L2-3 contacting the left L2 nerve root.      PLAN:  -Reviewed the MRI of the lumbar spine in detail with the patient.  -Advised him to complete the remainder of the Medrol Dosepak as prescribed.    -Continue gabapentin 300 mg 3 times daily.  -Instructed him to stop ibuprofen/NSAIDs while taking the steroid.  Okay to resume once the steroid is complete.  Advised he may take Tylenol up to 3g/day in the interim.  -He will send an update via [employer] after the steroid pack is complete.  If he is still having persistent pain, he would like to pursue an epidural steroid injection.  He describes pain in a mostly S1 distribution.  There may be some component of L5 involvement.   Consider L5-S1 IL CARMEN versus left L5 and S1 TFESI.  We discussed CARMEN procedure in detail  -He prefers to hold off on another PT referral at the moment.  He is working on the prior home exercise program on his own.  -He reports 1 episode of bowel leakage last week.  Since that time he has been continent of bowel and bladder.  We discussed that should he have any recurrent bowel or bladder incontinence, saddle anesthesia, increasing weakness/numbness, falls, or other concerning symptoms he should present to the emergency department and he expressed understanding  -suggest he follow up with PCP regarding chronic night sweats  -RTC TBD pending response to the Medrol Dosepak.  He will send a message via [employer] upon completion of the steroid.    Ready to learn, no apparent learning barriers.  Education provided on treatment plan according to patient's preferred learning style.  Patient verbalizes understanding.   __________________________________  Lisseth Grove NP  Physical Medicine and Rehabilitation, Medical Spine     40 minutes spent on the date of the encounter doing chart review, history and exam, documentation and further activities per the note

## 2023-09-29 ENCOUNTER — TELEPHONE (OUTPATIENT)
Dept: PHYSICAL MEDICINE AND REHAB | Facility: CLINIC | Age: 48
End: 2023-09-29
Payer: COMMERCIAL

## 2023-09-29 DIAGNOSIS — M51.16 LUMBAR DISC HERNIATION WITH RADICULOPATHY: ICD-10-CM

## 2023-09-29 DIAGNOSIS — M54.16 LUMBAR RADICULOPATHY: Primary | ICD-10-CM

## 2023-09-29 DIAGNOSIS — M54.42 ACUTE LEFT-SIDED LOW BACK PAIN WITH LEFT-SIDED SCIATICA: ICD-10-CM

## 2023-09-29 NOTE — TELEPHONE ENCOUNTER
Patient is scheduled for surgery with Dr. Olivarez    Spoke with: patient    Date of Surgery: shellie 10/04/23    Location: [facility]    Informed patient they will need an adult  yes    Additional comments: Patient is aware of date and time of the procedure.        Mag Watters MA on 9/29/2023 at 4:30 PM

## 2023-10-03 DIAGNOSIS — M51.16 LUMBAR DISC HERNIATION WITH RADICULOPATHY: ICD-10-CM

## 2023-10-03 DIAGNOSIS — M54.42 ACUTE LEFT-SIDED LOW BACK PAIN WITH LEFT-SIDED SCIATICA: ICD-10-CM

## 2023-10-03 DIAGNOSIS — M54.16 LUMBAR RADICULOPATHY: Primary | ICD-10-CM

## 2023-10-03 RX ORDER — GABAPENTIN 600 MG/1
600 TABLET ORAL 3 TIMES DAILY
Qty: 90 TABLET | Refills: 3 | Status: ON HOLD | OUTPATIENT
Start: 2023-10-03 | End: 2023-12-13

## 2023-10-03 RX ORDER — METHYLPREDNISOLONE 4 MG
TABLET, DOSE PACK ORAL
Qty: 21 TABLET | Refills: 0 | Status: SHIPPED | OUTPATIENT
Start: 2023-10-03 | End: 2023-10-30

## 2023-10-04 ENCOUNTER — HOSPITAL ENCOUNTER (OUTPATIENT)
Facility: AMBULATORY SURGERY CENTER | Age: 48
Discharge: HOME | End: 2023-10-04
Attending: PHYSICAL MEDICINE & REHABILITATION | Admitting: PHYSICAL MEDICINE & REHABILITATION
Payer: COMMERCIAL

## 2023-10-04 ENCOUNTER — ANCILLARY PROCEDURE (OUTPATIENT)
Dept: RADIOLOGY | Facility: AMBULATORY SURGERY CENTER | Age: 48
End: 2023-10-04
Attending: PHYSICAL MEDICINE & REHABILITATION
Payer: COMMERCIAL

## 2023-10-04 ENCOUNTER — TELEPHONE (OUTPATIENT)
Dept: PHYSICAL MEDICINE AND REHAB | Facility: CLINIC | Age: 48
End: 2023-10-04

## 2023-10-04 VITALS
OXYGEN SATURATION: 99 % | RESPIRATION RATE: 16 BRPM | SYSTOLIC BLOOD PRESSURE: 163 MMHG | DIASTOLIC BLOOD PRESSURE: 94 MMHG | TEMPERATURE: 97.4 F | HEART RATE: 92 BPM

## 2023-10-04 DIAGNOSIS — M54.16 LUMBAR RADICULOPATHY: ICD-10-CM

## 2023-10-04 PROCEDURE — 64483 NJX AA&/STRD TFRM EPI L/S 1: CPT | Mod: LT

## 2023-10-04 RX ORDER — IOPAMIDOL 408 MG/ML
INJECTION, SOLUTION INTRATHECAL PRN
Status: DISCONTINUED | OUTPATIENT
Start: 2023-10-04 | End: 2023-10-04 | Stop reason: HOSPADM

## 2023-10-04 RX ORDER — LIDOCAINE HYDROCHLORIDE 10 MG/ML
INJECTION, SOLUTION EPIDURAL; INFILTRATION; INTRACAUDAL; PERINEURAL PRN
Status: DISCONTINUED | OUTPATIENT
Start: 2023-10-04 | End: 2023-10-04 | Stop reason: HOSPADM

## 2023-10-04 RX ORDER — DEXAMETHASONE SODIUM PHOSPHATE 10 MG/ML
INJECTION INTRAMUSCULAR; INTRAVENOUS PRN
Status: DISCONTINUED | OUTPATIENT
Start: 2023-10-04 | End: 2023-10-04 | Stop reason: HOSPADM

## 2023-10-04 RX ORDER — VENLAFAXINE HYDROCHLORIDE 37.5 MG/1
37.5 CAPSULE, EXTENDED RELEASE ORAL EVERY MORNING
COMMUNITY

## 2023-10-04 NOTE — TELEPHONE ENCOUNTER
Reached out to pt, had to LVM asked if he could call back and confirm his post-injection scheduled  with Lisseth Grove on 10/19 at 2:30pm (arrive 15 minutes early)

## 2023-10-04 NOTE — DISCHARGE INSTRUCTIONS

## 2023-10-04 NOTE — OP NOTE
PROCEDURE NOTE: Lumbar Transforaminal Epidural Steroid Injection Under Fluoroscopic Guidance    PROCEDURE DATE: 10/4/2023    PATIENT NAME: Antonio Burger   YOB: 1975    ATTENDING PHYSICIAN:  Morena Olivarez MD   RESIDENT/FELLOW PHYSICIAN: None    PREOPERATIVE DIAGNOSIS:   Lumbar radiculopathy   POSTOPERATIVE DIAGNOSIS: same    PROCEDURE PERFORMED: Left Lumbar Transforaminal Epidural Steroid Injection at the L5-S1 and S1-2 level(s)    FLUOROSCOPY WAS USED.    INDICATIONS FOR PROCEDURE:   Antonio Burger is a 48 year old male with a clinical picture consistent with the above-mentioned diagnosis, resulting in radicular pain to the Left lower extremity.    PROCEDURE AND FINDINGS:    He was greeted in the pre-procedure holding area. The risk, benefits and alternatives to the procedure were again reviewed with the patient and written informed consent was placed in the chart. An IV line was not placed.  A 500 mL bag of NS was not connected to the patient. He was taken to the procedure room and positioned prone on the fluoroscopy table.  Routine monitors were applied including EKG leads (with sedation only), blood pressure cuff, and pulse oximetry. Prior to the procedure a time out was completed, verifying correct patient, procedure, site, positioning, and implants and/or special equipment.     An AP fluoroscopic  film was taken to identify the L5 pedicle and the S1 superior articulating process and the S1 dorsal foramen. The skin was prepped with chlorhexidine and draped in the usual sterile fashion. The skin and subcutaneous tissue overlying the aforementioned anatomic targets were anesthetized using a 27-gauge 1-1/4 inch needle with 1% preservative-free lidocaine for a total volume of 2mls, per level.      Then a 22-gauge 3.5 inch Quincke spinal needle was advanced under fluoroscopic guidance using an oblique view just inferior to the pedicle of the L5 and S1 level(s) on the Left side(s).  Then,  utilizing AP and lateral fluoroscopic views, we confirmed the position of the needle tip to be within the neural foramen. After negative aspiration, 1 mls of Isovue-M contrast was injected under AP view at each level and confirmed adequate spread along the nerve root and in the epidural space. There was no evidence of intravascular uptake or intrathecal spread on imaging.     At this point, after negative aspiration, a total 2mL volume of treatment injectate, consisting of 0.75mL Dexamethasone (10mg/mL) (15mg total dose), and  1.25 mL of 1% Lidocaine, was injected easily, per level.  The needle was then flushed with 0.5 ml of local anesthetic and removed. The needle insertion site was dressed appropriately.     Antonio Burger was taken to the recovery room where he was monitored for a brief period of time. He tolerated the procedure well and was discharged home in stable condition with post procedural instructions.     Before the procedure, he reported a pain score of 9/10.   After the procedure, he reported a pain score of 6/10.      [facility]      COMPLICATIONS:  None    Comment(s):  None

## 2023-10-18 NOTE — PROGRESS NOTES
"PM&[facility] Follow-Up Visit -     Date of Initial Visit: 2/17/2023  LOV: 10/4/2023 procedure visit  TD: 10/19/2023     Recall: Antonio Burger is a 48 year old male who presents with a chief complaint of left lower extremity pain & weakness.       INTERVAL HISTORY:  Patient was last seen in clinic for Left L5-S1 and S1 TFESI with Dr. Olivarez on 10/4/2023.     He is seen today in the clinic for follow-up.  He says that the \"lidocaine felt great\" after the procedure.  However his pain quickly returned to baseline shortly after the procedure.  He decided to take the second Medrol Dosepak.  He finished the steroid but had no improvement in the level of the pain.    He continues to experience pain radiating from the left buttocks, to the hamstring, and left calf.  The pain is aggravated by standing still.  After 15 seconds of standing he has to move around.  Squatting helps relieve the pain.   He has to take a break while walking due to pain.  He describes the pain as throbbing like a charley horse. He does not have pain while sitting.  He has not been able to sleep in a prone position as he prefers. He has a history of narcolepsy and his sleep has not been completely interrupted.    He has been taking 2 tablets of gabapentin 1-2 times per day.  He also takes Tylenol.    He continues to notice weakness in the left leg as well.    Previously, he was worried about doing PT for fear of missing more work.  However, now he thinks he may like to do PT.     He denies bowel or bladder incontinence or saddle anesthesia.        RECALL HISTORY OF PRESENT ILLNESS:  Antonio Burger is a 48 year old male who presents with a chief complaint of left lower extremity pain.     Pain began February 12, 2023 and is not associated with any particular trauma or inciting event.  He was in his usual state of health prior.  He reports that he is an otherwise relatively healthy 48-year-old individual with history of narcolepsy that has been well managed for " many years.  Per chart review and discussion with patient, he had been seen in 2021 for acute right-sided low back pain in the neurosurgery clinic which had improved/resolved with minimal treatment or management (medrol dosepak and flexeril).  He denies any major chronic spine pain.  He did see a chiropractor a few days after onset of symptoms which did not help.  His wife is an RN at [facility] [employer] and referred him to our [facility] for further evaluation.    Currently he localizes pain to the left gluteal region with radiating symptoms to the thigh that is painful, he does not have significant or consistent leg symptoms, but endorses numbness/tingling in the foot (dorsum); described as cramping and sharp in nature. Pain is reported as 8/10 at rest today and up to 9/10 at worst in the last week. No particular aggravating factors; and improved with ibuprofen/aleve (helps with back). He does report pain-related sleep disturbance - switched beds in his house with some improvement.  No bowel/bladder issues or saddle anesthesia.    Functional limitations: States that gait and ambulation have been disrupted due to pain and paresthesias.     9/27/2023:  Patient was last seen in clinic 2/17/2023 with Dr. Olivarez. At that visit, the plan was to start a Medrol Dosepak, start gabapentin, begin acute spine PT, and obtain an MRI of the lumbar spine.    He was seen today for follow-up via telehealth.  He reports a recent recurrent, atraumatic flare of back and left leg pain after reaching for something on a table. He had been prescribed another Medrol dose pack 9/22/2023 and advised follow-up in the spine clinic.    Today, he states he has completed half of the steroid pack so far.  His back is feeling 50% better since starting the steroid.  The steroid has not helped the symptoms in his left leg as of yet.    He has pain in the left buttocks extending down the posterior left thigh, and terminating before the knee.  In  the prior flare of pain earlier this year, his left foot was also numb but that has not happened this time.      He describes the left buttock and leg pain as like a constant charley horse.  The pain comes on after a few seconds of standing.  He says it feels as though someone punched the left buttocks.  His sleep has been interrupted because he has to keep his leg elevated on cushions.      He had an accidental fall when trying to put on his pants in the bathroom.  He grabbed a towel rack, but the rack came off the wall and he fell.  Since then, he has been laying down for lower body dressing.    He has left leg pain when walking and feels like he was walking with a limp.  He denies weakness or foot drop.    For pain he is also taking gabapentin 3 times a day and as needed ibuprofen.    He did physical therapy earlier this year.  He has been working on the exercises on his own.    Last week he had 1 bowel accident at work.  He has been noticing frequent formed bowel movements, but no loose stools.  He is not on a bowel regimen.  Since that episode he has not had any bowel incontinence.  He denies bladder incontinence, saddle anesthesia, fevers or chills.  He endorses off-and-on night sweats at baseline.      PRIOR INJURIES/TREATMENT:   Ice/Heat: heat pad    Physical Therapy:   none.  Has been working on his own home exercises.     - Current Pain Medications -   Gabapentin 600 mg 1-2 x/day  Tylenol    - Prior pain medications -  Lidoderm patch  Medrol Dosepak 9/22/2023 and 10/3/2023  OTC NSAIDs: ibuprofen      Prior Procedures:  Date    Procedure   Improvement (%)  none              Prior Related Surgery: none   Other (acupuncture, OMT, CMM, TENS, DME, etc.):   Chiropractor in the past    Specialists Seen - (with most recent, available notes and clinic visits reviewed)   1. None     IMAGING - reviewed   MR LUMBAR SPINE WITHOUT CONTRAST Open MRI at [facility] - 3/2/23  INTERPRETATION: 5 lumbar levels in lordotic  alignment. Conus medullaris terminates at L1-2 and has normal signal. No evidence of arachnoid disease or abnormal neural development. Vertebral body heights are maintained. Paraspinal soft tissues appear normal. Visualized sacroiliac joints and hip joints are grossly normal.    L5-S1: Mild disc degeneration and chronic Schmorl's nodes, 3 mm retrolisthesis, central 6 mm disc herniation encroaches/impinges left greater than right S1 nerve roots, normal facet joints. No central stenosis. Mild left foraminal stenosis.    L4-5: Mild disc degeneration posteriorly, disc bulge and left posterolateral T2 hyperintense 4 mm extruded disc herniation impinges left L5 (series 10 image 10, sagittal images 10-11), right subarticular recess stenosis, normal facet joints. No central or foraminal stenosis.    L3-4: No central or foraminal stenosis.    L2-3: Disc dehydration, right paracentral 2 mm disc protrusion, also superiorly migrating left foraminal 2-3 mm AP extruded/sequestered disc herniation contacts left L2 (series 6 image 11, series 10 image 29), normal facet joints. No central or foraminal stenosis.    T12-L1, L1-2: No central or foraminal stenosis.    CONCLUSION: Multilevel spondylosis with the following notable findings:    1. L5-S1 6 mm disc herniation encroaches/impinges left greater than right S1 nerve roots.    2. L4-5 left posterolateral 4 mm AP extruded herniation impinges left L5.    3. L2-3 superiorly migrating left foraminal 2-3 mm extruded/sequestered disc herniation contacts left L2.    10/18/2021 x-ray lumbar spine shows minimal retrolisthesis at L5-S1 with relatively well preserved disc space and without evidence of anterolisthesis or spondylolysis.    Review Of Systems:  I am responding to those symptoms which are directly relevant to the specific indication for my consultation. I recommend that the patient follow up with their primary or referring provider to pursue any other symptoms which may be of  "concern.       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     He  has a past surgical history that includes no history of surgery.    Family History  His family history includes Cancer in his maternal grandmother and paternal grandmother; Depression in his mother; Diabetes in his mother; Gastrointestinal Disease in his father; Hypertension in his mother.     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  Mostly sedentary but does use sit/stand desk in office.  No heavy lifting involved.  His wife is a nurse working in the procedures area with [employer].    Current living situation: Lives with wife who is an RN at [facility] [employer].   He does not smoke, drink alcohol, or illicit drug use.        Current Medications:   He has a current medication list which includes the following prescription(s): amphetamine-dextroamphetamine, dupixent, clobetasol, clobetasol, cyclobenzaprine, desonide, hydroxyzine, methylphenidate hcl, methylprednisolone, sildenafil, triamcinolone, triamcinolone, and varenicline.     Allergies:    -- No Known Drug Allergies     PHYSICAL EXAM  BP (!) 160/102 (BP Location: Left arm, Patient Position: Sitting, Cuff Size: Adult Regular)   Pulse 106   Resp 20   Ht 1.74 m (5' 8.5\")   Wt 92.8 kg (204 lb 9.6 oz)   SpO2 99%   BMI 30.65 kg/m     --CONSTITUTIONAL: Vital signs as above. No acute distress. The patient is well nourished and well groomed.  --PSYCHIATRIC: The patient is awake, alert, oriented to person, place, time and answering questions appropriately with clear speech. Appropriate mood and affect   --SKIN:  Posterior torso is  clean, dry, intact without rashes.   --RESPIRATORY: Normal rhythm and effort. No abnormal accessory muscle breathing patterns noted.   --GROSS MOTOR: Easily arises " from a seated position.  Standing at the counter for stability, he is able to balance on his heels.  He is able to perform 5 repetitions, balancing on 1 leg, lifting up onto his toes on each side though appears to be in pain  --STANDING EXAMINATION: Antalgic gait..  Posterior pelvic tilt. no lateral shift.  --LOWER EXTREMITY MOTOR TESTING:  Plantar flexion left 5/5, right 5/5   Dorsiflexion left 4+/5,, right 5/5   Great toe MTP extension left 4+/5, right 5/5  Knee flexion left 5/5, right 5/5  Knee extension left 5/5, right 5/5   Hip flexion left 5/5, right 5/5  --MUSCULOSKELETAL: Lumbar spine inspection reveals no evidence of deformity. Range of motion is not limited in lumbar flexion, extension, lateral rotation.  Lateral rotation to the right relieves pain.  Lateral rotation to the left makes the pain worse.  Seated slump test is positive.  No tenderness to palpation lumbar spine.  Facet loading maneuvers are positive on the left.  --NEUROLOGIC: 2/4 patellar and achilles reflexes bilaterally.  Sensation to light touch is intact in the bilateral L4, L5, and S1 dermatomes. No clonus.    --VASCULAR:  Warm lower limbs bilaterally. There is no pitting edema of the bilateral lower extremities.       ASSESSMENT:  Antonio Burger is a very pleasant 48 year old male who presents with acute left lower extremity pain in an S1 pattern and some weakness suggesting L5 radiculopathy.       MRI of the lumbar spine from 3/2/2023 shows disc herniations at L4-5 and L5-S1 impinging the left L5 and left S1 nerve roots.  There is also right S1 nerve root encroachment, though he has no right-sided symptoms.  There is also a disc extrusion at L2-3 contacting the left L2 nerve root.      PLAN:  -There is 4+/5 EHL weakness and dorsiflexion weakness on exam.  Seated slump test is positive.  He has ongoing weakness and pain despite 2 courses of oral steroid and epidural steroid injection.  We discussed referral for spine surgery consultation  and he was in agreement.  -In the interim, we discussed trying a L5-S1 [place] CARMEN.  He agreed and would like to schedule at the [place] location.  The case request order was entered.   -Advised him that he may take 3 tablets of gabapentin at bedtime (900 mg) and continue with 600 mg in the morning  -Add cyclobenzaprine 5mg TID PRN.  We discussed that he should not drive or do other activities requiring concentration while taking this medication.  -He is open to try PT.  An order was entered.  -RTC for procedure with Dr. Olivarez    Ready to learn, no apparent learning barriers.  Education provided on treatment plan according to patient's preferred learning style.  Patient verbalizes understanding.   __________________________________  Lisseth Grove NP  Physical Medicine and Rehabilitation, Medical Spine     52 minutes spent on the date of the encounter doing chart review, history and exam, documentation and further activities per the note

## 2023-10-19 ENCOUNTER — OFFICE VISIT (OUTPATIENT)
Dept: PHYSICAL MEDICINE AND REHAB | Facility: CLINIC | Age: 48
End: 2023-10-19
Payer: COMMERCIAL

## 2023-10-19 VITALS
OXYGEN SATURATION: 99 % | DIASTOLIC BLOOD PRESSURE: 102 MMHG | RESPIRATION RATE: 20 BRPM | HEART RATE: 106 BPM | SYSTOLIC BLOOD PRESSURE: 160 MMHG | HEIGHT: 69 IN | WEIGHT: 204.6 LBS | BODY MASS INDEX: 30.3 KG/M2

## 2023-10-19 DIAGNOSIS — R29.898 WEAKNESS OF LEFT LOWER EXTREMITY: ICD-10-CM

## 2023-10-19 DIAGNOSIS — M54.16 LUMBAR RADICULOPATHY: Primary | ICD-10-CM

## 2023-10-19 DIAGNOSIS — M51.16 LUMBAR DISC HERNIATION WITH RADICULOPATHY: ICD-10-CM

## 2023-10-19 DIAGNOSIS — M54.42 ACUTE LEFT-SIDED LOW BACK PAIN WITH LEFT-SIDED SCIATICA: ICD-10-CM

## 2023-10-19 PROCEDURE — 99215 OFFICE O/P EST HI 40 MIN: CPT | Performed by: NURSE PRACTITIONER

## 2023-10-19 RX ORDER — CYCLOBENZAPRINE HCL 5 MG
5 TABLET ORAL 3 TIMES DAILY PRN
Qty: 30 TABLET | Refills: 0 | Status: SHIPPED | OUTPATIENT
Start: 2023-10-19 | End: 2024-02-08

## 2023-10-19 RX ORDER — GABAPENTIN 300 MG/1
CAPSULE ORAL
Qty: 150 CAPSULE | Refills: 3 | Status: ON HOLD | OUTPATIENT
Start: 2023-10-19 | End: 2023-12-12

## 2023-10-19 ASSESSMENT — PAIN SCALES - GENERAL: PAINLEVEL: MODERATE PAIN (4)

## 2023-10-19 NOTE — LETTER
"10/19/2023       RE: Antonio Burger  [address]  [place] [address]     Dear Colleague,    Thank you for referring your patient, Antonio Burger, to the [facility] PHYSICAL MEDICINE AND REHABILITATION CLINIC [place] at [facility]. Please see a copy of my visit note below.    PM&R Follow-Up Visit -     Date of Initial Visit: 2/17/2023  LOV: 10/4/2023 procedure visit  TD: 10/19/2023     Recall: Antonio Burger is a 48 year old male who presents with a chief complaint of left lower extremity pain & weakness.       INTERVAL HISTORY:  Patient was last seen in clinic for Left L5-S1 and S1 TFESI with Dr. Olivarez on 10/4/2023.     He is seen today in the clinic for follow-up.  He says that the \"lidocaine felt great\" after the procedure.  However his pain quickly returned to baseline shortly after the procedure.  He decided to take the second Medrol Dosepak.  He finished the steroid but had no improvement in the level of the pain.    He continues to experience pain radiating from the left buttocks, to the hamstring, and left calf.  The pain is aggravated by standing still.  After 15 seconds of standing he has to move around.  Squatting helps relieve the pain.   He has to take a break while walking due to pain.  He describes the pain as throbbing like a charley horse. He does not have pain while sitting.  He has not been able to sleep in a prone position as he prefers. He has a history of narcolepsy and his sleep has not been completely interrupted.    He has been taking 2 tablets of gabapentin 1-2 times per day.  He also takes Tylenol.    He continues to notice weakness in the left leg as well.    Previously, he was worried about doing PT for fear of missing more work.  However, now he thinks he may like to do PT.     He denies bowel or bladder incontinence or saddle anesthesia.        RECALL HISTORY OF PRESENT ILLNESS:  Antonio Burger is a 48 year old male who " presents with a chief complaint of left lower extremity pain.     Pain began February 12, 2023 and is not associated with any particular trauma or inciting event.  He was in his usual state of health prior.  He reports that he is an otherwise relatively healthy 48-year-old individual with history of narcolepsy that has been well managed for many years.  Per chart review and discussion with patient, he had been seen in 2021 for acute right-sided low back pain in the neurosurgery clinic which had improved/resolved with minimal treatment or management (medrol dosepak and flexeril).  He denies any major chronic spine pain.  He did see a chiropractor a few days after onset of symptoms which did not help.  His wife is an RN at [employer] and referred him to our PM&R Spine Clinic for further evaluation.    Currently he localizes pain to the left gluteal region with radiating symptoms to the thigh that is painful, he does not have significant or consistent leg symptoms, but endorses numbness/tingling in the foot (dorsum); described as cramping and sharp in nature. Pain is reported as 8/10 at rest today and up to 9/10 at worst in the last week. No particular aggravating factors; and improved with ibuprofen/aleve (helps with back). He does report pain-related sleep disturbance - switched beds in his house with some improvement.  No bowel/bladder issues or saddle anesthesia.    Functional limitations: States that gait and ambulation have been disrupted due to pain and paresthesias.     9/27/2023:  Patient was last seen in clinic 2/17/2023 with Dr. Olivarez. At that visit, the plan was to start a Medrol Dosepak, start gabapentin, begin acute spine PT, and obtain an MRI of the lumbar spine.    He was seen today for follow-up via telehealth.  He reports a recent recurrent, atraumatic flare of back and left leg pain after reaching for something on a table. He had been prescribed another Medrol dose pack 9/22/2023 and advised  follow-up in the spine clinic.    Today, he states he has completed half of the steroid pack so far.  His back is feeling 50% better since starting the steroid.  The steroid has not helped the symptoms in his left leg as of yet.    He has pain in the left buttocks extending down the posterior left thigh, and terminating before the knee.  In the prior flare of pain earlier this year, his left foot was also numb but that has not happened this time.      He describes the left buttock and leg pain as like a constant charley horse.  The pain comes on after a few seconds of standing.  He says it feels as though someone punched the left buttocks.  His sleep has been interrupted because he has to keep his leg elevated on cushions.      He had an accidental fall when trying to put on his pants in the bathroom.  He grabbed a towel rack, but the rack came off the wall and he fell.  Since then, he has been laying down for lower body dressing.    He has left leg pain when walking and feels like he was walking with a limp.  He denies weakness or foot drop.    For pain he is also taking gabapentin 3 times a day and as needed ibuprofen.    He did physical therapy earlier this year.  He has been working on the exercises on his own.    Last week he had 1 bowel accident at work.  He has been noticing frequent formed bowel movements, but no loose stools.  He is not on a bowel regimen.  Since that episode he has not had any bowel incontinence.  He denies bladder incontinence, saddle anesthesia, fevers or chills.  He endorses off-and-on night sweats at baseline.      PRIOR INJURIES/TREATMENT:   Ice/Heat: heat pad    Physical Therapy:   none.  Has been working on his own home exercises.     - Current Pain Medications -   Gabapentin 600 mg 1-2 x/day  Tylenol    - Prior pain medications -  Lidoderm patch  Medrol Dosepak 9/22/2023 and 10/3/2023  OTC NSAIDs: ibuprofen      Prior Procedures:  Date    Procedure   Improvement  (%)  none              Prior Related Surgery: none   Other (acupuncture, OMT, CMM, TENS, DME, etc.):   Chiropractor in the past    Specialists Seen - (with most recent, available notes and clinic visits reviewed)   1. None     IMAGING - reviewed   MR LUMBAR SPINE WITHOUT CONTRAST Open MRI at [facility] - 3/2/23  INTERPRETATION: 5 lumbar levels in lordotic alignment. Conus medullaris terminates at L1-2 and has normal signal. No evidence of arachnoid disease or abnormal neural development. Vertebral body heights are maintained. Paraspinal soft tissues appear normal. Visualized sacroiliac joints and hip joints are grossly normal.    L5-S1: Mild disc degeneration and chronic Schmorl's nodes, 3 mm retrolisthesis, central 6 mm disc herniation encroaches/impinges left greater than right S1 nerve roots, normal facet joints. No central stenosis. Mild left foraminal stenosis.    L4-5: Mild disc degeneration posteriorly, disc bulge and left posterolateral T2 hyperintense 4 mm extruded disc herniation impinges left L5 (series 10 image 10, sagittal images 10-11), right subarticular recess stenosis, normal facet joints. No central or foraminal stenosis.    L3-4: No central or foraminal stenosis.    L2-3: Disc dehydration, right paracentral 2 mm disc protrusion, also superiorly migrating left foraminal 2-3 mm AP extruded/sequestered disc herniation contacts left L2 (series 6 image 11, series 10 image 29), normal facet joints. No central or foraminal stenosis.    T12-L1, L1-2: No central or foraminal stenosis.    CONCLUSION: Multilevel spondylosis with the following notable findings:    1. L5-S1 6 mm disc herniation encroaches/impinges left greater than right S1 nerve roots.    2. L4-5 left posterolateral 4 mm AP extruded herniation impinges left L5.    3. L2-3 superiorly migrating left foraminal 2-3 mm extruded/sequestered disc herniation contacts left L2.    10/18/2021 x-ray lumbar spine shows minimal retrolisthesis at L5-S1 with  "relatively well preserved disc space and without evidence of anterolisthesis or spondylolysis.    Review Of Systems:  I am responding to those symptoms which are directly relevant to the specific indication for my consultation. I recommend that the patient follow up with their primary or referring provider to pursue any other symptoms which may be of concern.       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     He  has a past surgical history that includes no history of surgery.    Family History  His family history includes Cancer in his maternal grandmother and paternal grandmother; Depression in his mother; Diabetes in his mother; Gastrointestinal Disease in his father; Hypertension in his mother.     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  Mostly sedentary but does use sit/stand desk in office.  No heavy lifting involved.  His wife is a nurse working in the procedures area with [employer].    Current living situation: Lives with wife who is an RN at [employer].   He does not smoke, drink alcohol, or illicit drug use.        Current Medications:   He has a current medication list which includes the following prescription(s): amphetamine-dextroamphetamine, dupixent, clobetasol, clobetasol, cyclobenzaprine, desonide, hydroxyzine, methylphenidate hcl, methylprednisolone, sildenafil, triamcinolone, triamcinolone, and varenicline.     Allergies:    -- No Known Drug Allergies     PHYSICAL EXAM  BP (!) 160/102 (BP Location: Left arm, Patient Position: Sitting, Cuff Size: Adult Regular)   Pulse 106   Resp 20   Ht 1.74 m (5' 8.5\")   Wt 92.8 kg (204 lb 9.6 oz)   SpO2 99%   BMI 30.65 kg/m     --CONSTITUTIONAL: Vital signs as above. No acute distress. The patient is well nourished and well " groomed.  --PSYCHIATRIC: The patient is awake, alert, oriented to person, place, time and answering questions appropriately with clear speech. Appropriate mood and affect   --SKIN:  Posterior torso is  clean, dry, intact without rashes.   --RESPIRATORY: Normal rhythm and effort. No abnormal accessory muscle breathing patterns noted.   --GROSS MOTOR: Easily arises from a seated position.  Standing at the counter for stability, he is able to balance on his heels.  He is able to perform 5 repetitions, balancing on 1 leg, lifting up onto his toes on each side though appears to be in pain  --STANDING EXAMINATION: Antalgic gait..  Posterior pelvic tilt. no lateral shift.  --LOWER EXTREMITY MOTOR TESTING:  Plantar flexion left 5/5, right 5/5   Dorsiflexion left 4+/5,, right 5/5   Great toe MTP extension left 4+/5, right 5/5  Knee flexion left 5/5, right 5/5  Knee extension left 5/5, right 5/5   Hip flexion left 5/5, right 5/5  --MUSCULOSKELETAL: Lumbar spine inspection reveals no evidence of deformity. Range of motion is not limited in lumbar flexion, extension, lateral rotation.  Lateral rotation to the right relieves pain.  Lateral rotation to the left makes the pain worse.  Seated slump test is positive.  No tenderness to palpation lumbar spine.  Facet loading maneuvers are positive on the left.  --NEUROLOGIC: 2/4 patellar and achilles reflexes bilaterally.  Sensation to light touch is intact in the bilateral L4, L5, and S1 dermatomes. No clonus.    --VASCULAR:  Warm lower limbs bilaterally. There is no pitting edema of the bilateral lower extremities.       ASSESSMENT:  Antonio Burger is a very pleasant 48 year old male who presents with acute left lower extremity pain in an S1 pattern and some weakness suggesting L5 radiculopathy.       MRI of the lumbar spine from 3/2/2023 shows disc herniations at L4-5 and L5-S1 impinging the left L5 and left S1 nerve roots.  There is also right S1 nerve root encroachment, though he  has no right-sided symptoms.  There is also a disc extrusion at L2-3 contacting the left L2 nerve root.      PLAN:  -There is 4+/5 EHL weakness and dorsiflexion weakness on exam.  Seated slump test is positive.  He has ongoing weakness and pain despite 2 courses of oral steroid and epidural steroid injection.  We discussed referral for spine surgery consultation and he was in agreement.  -In the interim, we discussed trying a L5-S1 IL CARMEN.  He agreed and would like to schedule at the [place] location.  The case request order was entered.   -Advised him that he may take 3 tablets of gabapentin at bedtime (900 mg) and continue with 600 mg in the morning  -Add cyclobenzaprine 5mg TID PRN.  We discussed that he should not drive or do other activities requiring concentration while taking this medication.  -He is open to try PT.  An order was entered.  -RTC for procedure with Dr. Olivarez    Ready to learn, no apparent learning barriers.  Education provided on treatment plan according to patient's preferred learning style.  Patient verbalizes understanding.       52 minutes spent on the date of the encounter doing chart review, history and exam, documentation and further activities per the note      Again, thank you for allowing me to participate in the care of your patient.      Sincerely,    ELICIA Sivla CNP

## 2023-10-19 NOTE — PATIENT INSTRUCTIONS
It was a pleasure seeing you in the clinic today.  As discussed, we made the following updates to your plan of care:    I will add an order for L5-S1 interlaminar epidural steroid injection with Dr Olivarez at [facility] as we discussed.  You will receive a call from the schedulers to help set up the appointment.     An order for physical therapy was added today. Physical therapy schedulers should call you in the next few days to schedule an appointment. You may also call [phone] to arrange an appointment at any of the [facility] outpatient physical therapy locations.     You can take 3 tablets of gabapentin (900 mg) before bed and continue with 2 tablets in the morning (600 mg).    Cyclobenzaprine was prescribed today.  This is a muscle relaxer that may be taken for muscle tightness and pain as needed.  This drug can cause sedation / drowsiness, so when taking this medication avoid driving or other tasks that require you to be alert.     I will add a surgery referral.  You will receive a call from the schedulers to set up the appointment    Thank you,  Lisseth Grove NP  Physical Medicine and Rehabilitation, Medical Spine  PM&R clinic Phone: [phone]  PM&R clinic Fax: [phone]        Preparing for Spine Injection Therapy              Why Do I Need Spine Injection Therapy?  Your Health Care Provider is recommending spine injection therapy to  help relieve your back and neck pain. This will be in addition to other therapies such as medications and physical therapy. The purpose of these injections is to reduce the amount of inflammation (swelling, pain, heat, redness, loss of body function) around the nerves thus reducing the amount of pain.     The medications you will receive with the injections will include:   Anesthetic - medication to numb the painful area.    Steroid - medication that prevents or reduces swelling and pain (anti-inflammatory).   To reduce your discomfort during the  injection procedure, you will receive a numbing medication injection prior to the placement of the needles. You will be lying on your stomach during the injection procedure. We will use a low-dose x-ray (fluoroscopy) to help guide needle placement.  You must have a  for this procedure. We will need to reschedule your injection if you do not have a  with you.      What are the different types of injections and procedure?  Below, is a brief description of the different types of injections we use to deliver pain medication as close as possible to the nerves in the painful area:    Epidural injection: (picture on the right) Epidural injections place 2 medications in your epidural space.  This is the space alongside your spinal canal (not inside of it). Nerves from your spinal cord pass through this space. The medications will bathe those nerves.       Facet joint injection: These injections place 2 medications into the joints of your neck or spine.   SI joint injection: (picture on the left) deliver pain medications into the Sacroiliac joint that connects the hip bones.   Hip joint injection: deliver pain medication to the joint that connect your hip and femur bones (the femur is the bone in the center of the leg that extends from the knee with the hip).  You will be lying on your side with your affected side up. For example, if we are injecting your left hip, then you will be lying on your right side.   Medial Branch Block injections: This is a test to see if your pain is coming from a specific nerve. This injection is similar to a facet joint injection but contains only the numbing medication.  You will keep a pain score diary for the rest of the day and the following morning after receiving the injection.    Radiofrequency ablation: This is a procedure that uses radio waves and numbing medication to block the nerves that feel pain at the joint. The pain relief effects can last for a long time, but are not  permanent. The procedure is similar to the Medial Branch Block but requires additional testing to ensure that the needle is near the nerve before numbing and ablating it.  Doctors often order sedation for this procedure.  Please see the sections on sedation below.      What Should I Expect if I Receive Sedation? THIS IS ORDERED BY THE PHYSICIAN  This is conscious sedation.  The medications we give to you will help you relax and reduce your anxiety.  You will still be awake for the procedure so we can ask you questions and hear your answers.     What Are My Responsibilities With Sedation?  You must stop eating and drinking 8 (eight) hours before your procedure. You can have clear fluids (water, coffee/tea without milk) up to 2 hours prior to the injections. Nothing by mouth for 2 hours prior to injection.  This includes gum, mints, or chew.  Take your morning medications with a small sip of water.    You must have a  who will check-in with you, and stay in the building while the procedure is underway.  If you do not have a  your sedation will be cancelled.  We will monitor you for at least 30 minutes after the procedure before being discharged home.      What Are the Risks and Complications For This Procedure?  Risks and complication are rare, but can still occur.  You should understand, discuss, and accept these risks before agreeing to the procedure. They include, but are not limited to:  infection  nerve damage   paralysis  injection failure or a need for further injections or additional procedures  continued or worsening of symptoms/pain,   medication reaction,  dural leak (into the hole covering around the spinal cord. This may cause a a spinal headache)  leak of the medication into the spinal canal, nerves, or blood vessel.  death       What do I need to do before the procedure?   If you do not follow these instructions your procedure may be cancelled.  Tell us if you are on major blood thinners  such as Coumadin, Xarelto , Plavix, Eliquis , Pradaxa , or others.    Contact the doctor who prescribed your blood thinner to ask for permission to stop taking it before you have the injection.    Schedule your pain injection procedure after your doctor gave their permission.   We will notify you when to stop and re-start your blood thinner.  Tell us if you have any allergies to contrast dye.  If you do, we may give additional medications to take before the procedure.  Tell us if you are pregnant, or possibly pregnant.  If so, you cannot receive steroid medications or be exposed to fluoroscopic X-rays.  Tell us if you have been sick during the 10 days before the procedure. This includes:   colds   gastrointestinal illness or discomfort  dental sores,   skin infection, or any other type of infection.  Tell us if you have taken antibiotics during the 10 days prior to the procedure.  Do not drink alcohol the night before or on the day of the procedure.  You must shower the night before and on the day of your procedure.  Wear comfortable, clean clothing.  If you have an outside MRI (Magnetic Resonance Imaging photo), please bring it with you.    What Will Happen After the Procedure?  If you did not receive sedation we will monitor you for 15 minutes after the procedure. If you received sedation, we will monitor you for at least 30 minutes after the procedure     How soon can I expect pain relief?  You have received 2 types of medications with your injection:    Anesthetic - numbing medication which only acts for a few hours    Steroid which may take 3-14 days to be effective.   You can expect to feel your normal pain after the anesthetic wears off, until the steroid becomes effective.    How should I care for myself at home?  Get plenty of rest and avoid twisting, bending movements, heavy lifting, or strenuous activity for the first 24 hours. This will help the steroid be more effective. Medial Branch Block injections  will have different discharge instructions.  Those will be discussed at that injection appointment.  Resume your pain medications  Apply ice packs (on for 20 minutes at a time), every 2-3 hours to your injection area for the first 2-3 days to help with pain control.    Avoid heat (pads or water bottles), which can cause the veins to open up, making the steroid less effective. You can use heat after 48 hours.  Take showers only for the first 48 hours.  No baths, hot tubs, swimming, or soaking for 48 hours to reduce the risk of infection.     When should I call the doctor?  Call us if you have any of the following:   Fever more than 100.5 degrees Fahrenheit   Signs of infection   Severe headache   Severe back pain   Increased numbness or weakness in your legs or arms   Loss of bladder or bowel control  Nausea   Other concerns    What is the contact information?  During business hours Monday-Friday 8a-5p call [phone].   After business hours, on weekends and holidays call [phone] and ask for the PM&R doctor on call

## 2023-10-19 NOTE — NURSING NOTE
"Chief Complaint   Patient presents with    RECHECK     BP (!) 160/102 (BP Location: Left arm, Patient Position: Sitting, Cuff Size: Adult Regular)   Pulse 106   Resp 20   Ht 1.74 m (5' 8.5\")   Wt 92.8 kg (204 lb 9.6 oz)   SpO2 99%   BMI 30.65 kg/m      DARY LAWSON    "

## 2023-10-23 ENCOUNTER — OFFICE VISIT (OUTPATIENT)
Dept: NEUROSURGERY | Facility: CLINIC | Age: 48
End: 2023-10-23
Payer: COMMERCIAL

## 2023-10-23 VITALS
BODY MASS INDEX: 30.57 KG/M2 | WEIGHT: 204.06 LBS | DIASTOLIC BLOOD PRESSURE: 106 MMHG | SYSTOLIC BLOOD PRESSURE: 162 MMHG | HEART RATE: 96 BPM | OXYGEN SATURATION: 99 %

## 2023-10-23 DIAGNOSIS — M54.16 LUMBAR RADICULOPATHY: ICD-10-CM

## 2023-10-23 DIAGNOSIS — M54.42 ACUTE LEFT-SIDED LOW BACK PAIN WITH LEFT-SIDED SCIATICA: ICD-10-CM

## 2023-10-23 DIAGNOSIS — M51.16 LUMBAR DISC HERNIATION WITH RADICULOPATHY: ICD-10-CM

## 2023-10-23 DIAGNOSIS — R29.898 WEAKNESS OF LEFT LOWER EXTREMITY: ICD-10-CM

## 2023-10-23 PROCEDURE — 99214 OFFICE O/P EST MOD 30 MIN: CPT | Performed by: NEUROLOGICAL SURGERY

## 2023-10-23 ASSESSMENT — PAIN SCALES - GENERAL: PAINLEVEL: SEVERE PAIN (7)

## 2023-10-23 NOTE — PROGRESS NOTES
Neurosurgery Clinic Note    Chief Complaint: left leg pain    History of Present Illness:  It was a pleasure to evaluate Antonio Burger in clinic today at the kind referral of ELICIA Silva CNP  [address] [place] [address].    Antonio Burger is a 48 year old male presenting with pain radiating from low back to left buttock and left leg posterior calf. Pain is cramping and occasionally burning, worse with movement and good relief lasting only for one day with left L5-S1, left S1-S2 TFESI by Dr. Olivarez 10/4/23. Started about 6 months ago. No prior spine surgery. Former smoker, quit years ago.  No right leg symptoms.          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 Started at 16 years       Past Surgical History:   Procedure Laterality Date    INJECT EPIDURAL TRANSFORAMINAL Left 10/4/2023    Procedure: Left L5-S1 and S1 transforaminal epidural steroid injection;  Surgeon: Morena Olivarez MD;  Location: [facility] OR    NO HISTORY OF SURGERY         Social History     Socioeconomic History    Marital status:     Number of children: 0    Years of education: 16   Occupational History    Occupation:     Occupation: Teacher     Employer: [employer]   Tobacco Use    Smoking status: Former     Packs/day: 1.00     Years: 13.00     Additional pack years: 0.00     Total pack years: 13.00     Types: Cigarettes     Quit date: 2011     Years since quittin.8    Smokeless tobacco: Never   Substance and Sexual Activity    Alcohol use: Yes     Comment: occ    Drug use: No    Sexual activity: Yes     Partners: Female   Other Topics Concern    Parent/sibling w/ CABG, MI or angioplasty before 65F 55M? Yes     Comment:  mom passed at the age of 59 from  "heart attack       family history includes Cancer in his maternal grandmother and paternal grandmother; Depression in his mother; Diabetes in his mother; Gastrointestinal Disease in his father; Hypertension in his mother.        IMAGING per my own measurement and interpretation:  Xrays:NONE RECENT    MRI lumbar spine 3/2/23 obtained from [facility]: left L4-5, left L5-S1 disc herniations  with severe lateral recess stenosis affecting traversing L5 and S1 nerve roots        Resulted Imaging/Labs:  Bone Density:  XR Surgery PATTI L/T 5 Min Fluoro w Stills    Result Date: 10/4/2023  This exam was marked as non-reportable because it will not be read by a radiologist or a [facility] non-radiologist provider.       Vitamin D:  Vitamin D Deficiency Screening Results:  No results found for: \"VITDT\"  No results found for: \"KCA888\", \"CHQC492\", \"TFCZ28XDBPW\", \"VITD3\", \"D2VIT\", \"D3VIT\", \"DTOT\", \"[id number]\", \"[id number]\", \"[id number]\", \"[id number]\", \"[id number]\", \"[id number]\"      Nutritional Status:  Estimated body mass index is 30.65 kg/m  as calculated from the following:    Height as of 10/19/23: 1.74 m (5' 8.5\").    Weight as of 10/19/23: 92.8 kg (204 lb 9.6 oz).    No results found for: \"ALBUMIN\"    Diabetes Screening:  No results found for: \"A1C\"    Nicotine Usage:  Former but quit years ago                Physical Exam   BP (!) 162/106   Pulse 96   Wt 92.6 kg (204 lb 1 oz)   SpO2 99%   BMI 30.57 kg/m      Constitutional: Oriented to person, place, and time. Appears well-developed and well-nourished. Cooperative. No distress.     Neurological: alert and oriented to person, place, and time.   sensory deficit denies  Gait normal      Reflex Scores:        Tricep reflexes are 2+ on the right side and 2+ on the left side.       Bicep reflexes are 2+ on the right side and 2+ on the left side.       Brachioradialis reflexes are 2+ on the right side and 2+ on the left side.       Patellar reflexes are 2+ on the right side " and 2+ on the left side.       Achilles reflexes are 2+ on the right side and 0 on the left side.      STRENGTH LEFT RIGHT                                  5 5       Hip Flexion     5     5   Knee Extension 5 5   Ankle Dorsiflexion 4 5   Extensor Hallucis Longus 4 5   Plantar Flexion 4 5   Foot eversion 4 5   Foot inversion 4 5     Single-leg standing calf-raise asymmetry with weakness on left  Heel-walk difficulty with keeping left foot dorsiflexed  Toe-walk difficulty with keeping left heel elevated      Skin: Skin is warm, dry and intact.   Psychiatric: Normal mood and affect. Speech is normal and behavior is normal.        ASSESSMENT:  Antonio Burger is a 48 year old male with left L5 radiculopathy from left L4-5 disc herniation with lateral recess stenosis and left S1 radiculopathy from left L5-S1 disc  herniation with lateral recess stenosis.    PLAN:    I showed Mr. Burger his MRI and explained the disc herniations causing compression of the nerves. Because he has clinical weakness on exam a significant time after onset of symptoms, I recommended surgery. I would not  proceed with any more steroid injections given the clinical weakness and the lack of duration of effect of last injection. CARMEN could increase risk of complication after spine surgery.      The patient will require flexion-extension and standing AP/lateral xrays to evaluate spinal alignmen- these have been ordered from [facility]. Return to clinic for surgical discussion following  xrays.      Emerita Jay MD    [facility] Department of Neurosurgery  Complex Spinal Deformity, Scoliosis, and Minimally Invasive Spine Surgery Specialist  Office: [phone]    10/23/2023        I performed independent visualization of radiographic imaging and entered my own interpretation, reviewed and/or ordered tests in radiology, made the decision to obtain old records and/or history from someone other than the patient, and  Reviewed and summarized old records and/or discussed this case with another health care provider

## 2023-10-23 NOTE — LETTER
10/23/2023       RE: Antonio Burger  [address]  [place] [address]     Dear Colleague,    Thank you for referring your patient, Antonio Burger, to the  [facility]. Please see a copy of my visit note below.        Neurosurgery Clinic Note    Chief Complaint: left leg pain    History of Present Illness:  It was a pleasure to evaluate Antonio Burger in clinic today at the kind referral of ELICIA Silva [facility]  [address] [place] [address].    Antonio Burger is a 48 year old male presenting with pain radiating from low back to left buttock and left leg posterior calf. Pain is cramping and occasionally burning, worse with movement and good relief lasting only for one day with left L5-S1, left S1-S2 TFESI by Dr. Olivarez 10/4/23. Started about 6 months ago. No prior spine surgery. Former smoker, quit years ago.  No right leg symptoms.          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 Started at 16 years       Past Surgical History:   Procedure Laterality Date    INJECT EPIDURAL TRANSFORAMINAL Left 10/4/2023    Procedure: Left L5-S1 and S1 transforaminal epidural steroid injection;  Surgeon: Morena Olivarez MD;  Location: [facility] OR    NO HISTORY OF SURGERY         Social History     Socioeconomic History    Marital status:     Number of children: 0    Years of education: 16   Occupational History    Occupation:     Occupation: Teacher     Employer: [employer]   Tobacco Use    Smoking status: Former     Packs/day: 1.00     Years: 13.00     Additional pack years: 0.00     Total pack years: 13.00     Types: Cigarettes     Quit date: 2011     Years since quittin.8  "   Smokeless tobacco: Never   Substance and Sexual Activity    Alcohol use: Yes     Comment: occ    Drug use: No    Sexual activity: Yes     Partners: Female   Other Topics Concern    Parent/sibling w/ CABG, MI or angioplasty before 65F 55M? Yes     Comment:  mom passed at the age of 59 from heart attack       family history includes Cancer in his maternal grandmother and paternal grandmother; Depression in his mother; Diabetes in his mother; Gastrointestinal Disease in his father; Hypertension in his mother.        IMAGING per my own measurement and interpretation:  Xrays:NONE RECENT    MRI lumbar spine 3/2/23 obtained from [facility]: left L4-5, left L5-S1 disc herniations  with severe lateral recess stenosis affecting traversing L5 and S1 nerve roots        Resulted Imaging/Labs:  Bone Density:  XR Surgery PATTI L/T 5 Min Fluoro w Stills    Result Date: 10/4/2023  This exam was marked as non-reportable because it will not be read by a radiologist or a [place] non-radiologist provider.       Vitamin D:  Vitamin D Deficiency Screening Results:  No results found for: \"VITDT\"  No results found for: \"TCK327\", \"WWVT710\", \"PXON48YSEBY\", \"VITD3\", \"D2VIT\", \"D3VIT\", \"DTOT\", \"OA04214686\", \"IT01292837\", \"[id number]\", \"[id number]\", \"[id number]\", \"[id number]\"      Nutritional Status:  Estimated body mass index is 30.65 kg/m  as calculated from the following:    Height as of 10/19/23: 1.74 m (5' 8.5\").    Weight as of 10/19/23: 92.8 kg (204 lb 9.6 oz).    No results found for: \"ALBUMIN\"    Diabetes Screening:  No results found for: \"A1C\"    Nicotine Usage:  Former but quit years ago                Physical Exam   BP (!) 162/106   Pulse 96   Wt 92.6 kg (204 lb 1 oz)   SpO2 99%   BMI 30.57 kg/m      Constitutional: Oriented to person, place, and time. Appears well-developed and well-nourished. Cooperative. No distress.     Neurological: alert and oriented to person, place, and time.   sensory deficit denies  Gait " normal      Reflex Scores:        Tricep reflexes are 2+ on the right side and 2+ on the left side.       Bicep reflexes are 2+ on the right side and 2+ on the left side.       Brachioradialis reflexes are 2+ on the right side and 2+ on the left side.       Patellar reflexes are 2+ on the right side and 2+ on the left side.       Achilles reflexes are 2+ on the right side and 0 on the left side.      STRENGTH LEFT RIGHT                                  5 5       Hip Flexion     5     5   Knee Extension 5 5   Ankle Dorsiflexion 4 5   Extensor Hallucis Longus 4 5   Plantar Flexion 4 5   Foot eversion 4 5   Foot inversion 4 5     Single-leg standing calf-raise asymmetry with weakness on left  Heel-walk difficulty with keeping left foot dorsiflexed  Toe-walk difficulty with keeping left heel elevated      Skin: Skin is warm, dry and intact.   Psychiatric: Normal mood and affect. Speech is normal and behavior is normal.        ASSESSMENT:  Antonio Burger is a 48 year old male with left L5 radiculopathy from left L4-5 disc herniation with lateral recess stenosis and left S1 radiculopathy from left L5-S1 disc  herniation with lateral recess stenosis.    PLAN:    I showed Mr. Burger his MRI and explained the disc herniations causing compression of the nerves. Because he has clinical weakness on exam a significant time after onset of symptoms, I recommended surgery. I would not  proceed with any more steroid injections given the clinical weakness and the lack of duration of effect of last injection. CARMEN could increase risk of complication after spine surgery.      The patient will require flexion-extension and standing AP/lateral xrays to evaluate spinal alignmen- these have been ordered from [facility]. Return to clinic for surgical discussion following  xrays.      I performed independent visualization of radiographic imaging and entered my own interpretation, reviewed and/or ordered tests in radiology, made the decision to  obtain old records and/or history from someone other than the patient, and Reviewed and summarized old records and/or discussed this case with another health care provider          Again, thank you for allowing me to participate in the care of your patient.      Sincerely,    Emerita Jay MD

## 2023-10-23 NOTE — PATIENT INSTRUCTIONS
Dr. Jay ordered xrays of your spine to evaluate your spine alignment. These can be done at [facility], they will call you to schedule. Please call [phone] to schedule an appointment to see Dr. Jay at [facility] or [phone] to see Dr. Jay at the [facility] when the imaging is completed.

## 2023-10-23 NOTE — NURSING NOTE
"Antonio Burger is a 48 year old male who presents for:  Chief Complaint   Patient presents with    Consult     lumbar radiculopathy/acute L sided low back pain/lumbar disc hernaition        Initial Vitals:  BP (!) 162/106   Pulse 96   Wt 92.6 kg (204 lb 1 oz)   SpO2 99%   BMI 30.57 kg/m   Estimated body mass index is 30.57 kg/m  as calculated from the following:    Height as of 10/19/23: 1.74 m (5' 8.5\").    Weight as of this encounter: 92.6 kg (204 lb 1 oz).. Body surface area is 2.12 meters squared. BP completed using cuff size: regular  Severe Pain (7)      Iesha Reis   "

## 2023-10-26 ENCOUNTER — ANCILLARY PROCEDURE (OUTPATIENT)
Dept: GENERAL RADIOLOGY | Facility: CLINIC | Age: 48
End: 2023-10-26
Attending: NEUROLOGICAL SURGERY
Payer: COMMERCIAL

## 2023-10-26 DIAGNOSIS — M54.16 LUMBAR RADICULOPATHY: ICD-10-CM

## 2023-10-26 PROCEDURE — 72110 X-RAY EXAM L-2 SPINE 4/>VWS: CPT | Performed by: STUDENT IN AN ORGANIZED HEALTH CARE EDUCATION/TRAINING PROGRAM

## 2023-10-30 ENCOUNTER — OFFICE VISIT (OUTPATIENT)
Dept: PHYSICAL MEDICINE AND REHAB | Facility: CLINIC | Age: 48
End: 2023-10-30
Payer: COMMERCIAL

## 2023-10-30 VITALS — DIASTOLIC BLOOD PRESSURE: 105 MMHG | SYSTOLIC BLOOD PRESSURE: 152 MMHG | HEART RATE: 105 BPM | OXYGEN SATURATION: 99 %

## 2023-10-30 DIAGNOSIS — R29.898 WEAKNESS OF LEFT LOWER EXTREMITY: ICD-10-CM

## 2023-10-30 DIAGNOSIS — M54.16 LUMBAR RADICULOPATHY: Primary | ICD-10-CM

## 2023-10-30 DIAGNOSIS — M51.16 LUMBAR DISC HERNIATION WITH RADICULOPATHY: ICD-10-CM

## 2023-10-30 PROCEDURE — 99213 OFFICE O/P EST LOW 20 MIN: CPT | Performed by: PHYSICAL MEDICINE & REHABILITATION

## 2023-10-30 ASSESSMENT — PAIN SCALES - GENERAL: PAINLEVEL: SEVERE PAIN (6)

## 2023-10-30 NOTE — NURSING NOTE
Chief Complaint   Patient presents with    RECHECK     Injection follow up   BP (!) 152/105   Pulse 105   SpO2 99%     Geni Badillo CMA at 3:03 PM on 10/30/2023.

## 2023-10-30 NOTE — LETTER
10/30/2023       RE: Antonio Burger  [address]  [place] [address]     Dear Colleague,    Thank you for referring your patient, Antonio Burger, to the [facility] [place] at [facility]. Please see a copy of my visit note below.    PM&R Follow-Up Visit -     Date of Initial Visit: 2/17/2023  LOV: 10/19/23  TD: 10/30/2023      Recall: Antonio Burger is a 48 year old male who follows up for left lumbosacral radiculopathy    INTERVAL HISTORY:  Patient was last seen in the [facility] by Lisseth Grove NP.  Since last time, he was evaluated in the spine surgery clinic by Dr. Jay, 10/23/2023.  Additional imaging was ordered and completed last week.  He returns to our clinic to review some FMLA paperwork that needs to be filled out due to time off work secondary to his ongoing symptoms, appointments, procedures    RECALL HISTORY OF PRESENT ILLNESS:  Antonio Burger is a 48 year old male who presents with a chief complaint of left lower extremity pain.     Pain began February 12, 2023 and is not associated with any particular trauma or inciting event.  He was in his usual state of health prior.  He reports that he is an otherwise relatively healthy 48-year-old individual with history of narcolepsy that has been well managed for many years.  Per chart review and discussion with patient, he had been seen in 2021 for acute right-sided low back pain in the neurosurgery clinic which had improved/resolved with minimal treatment or management (medrol dosepak and flexeril).  He denies any major chronic spine pain.  He did see a chiropractor a few days after onset of symptoms which did not help.  His wife is an RN at [employer] and referred him to our [facility] Spine Clinic for further evaluation.    Currently he localizes pain to the left gluteal region with radiating symptoms to the thigh that is painful, he  does not have significant or consistent leg symptoms, but endorses numbness/tingling in the foot (dorsum); described as cramping and sharp in nature. Pain is reported as 8/10 at rest today and up to 9/10 at worst in the last week. No particular aggravating factors; and improved with ibuprofen/aleve (helps with back). He does report pain-related sleep disturbance - switched beds in his house with some improvement.  No bowel/bladder issues or saddle anesthesia.    Functional limitations: States that gait and ambulation have been disrupted due to pain and paresthesias.     9/27/2023:  Patient was last seen in clinic 2/17/2023 with Dr. Olivarez. At that visit, the plan was to start a Medrol Dosepak, start gabapentin, begin acute spine PT, and obtain an MRI of the lumbar spine.    He was seen today for follow-up via telehealth.  He reports a recent recurrent, atraumatic flare of back and left leg pain after reaching for something on a table. He had been prescribed another Medrol dose pack 9/22/2023 and advised follow-up in the spine clinic.    Today, he states he has completed half of the steroid pack so far.  His back is feeling 50% better since starting the steroid.  The steroid has not helped the symptoms in his left leg as of yet.    He has pain in the left buttocks extending down the posterior left thigh, and terminating before the knee.  In the prior flare of pain earlier this year, his left foot was also numb but that has not happened this time.      He describes the left buttock and leg pain as like a constant charley horse.  The pain comes on after a few seconds of standing.  He says it feels as though someone punched the left buttocks.  His sleep has been interrupted because he has to keep his leg elevated on cushions.      He had an accidental fall when trying to put on his pants in the bathroom.  He grabbed a towel rack, but the rack came off the wall and he fell.  Since then, he has been laying down for lower  body dressing.    He has left leg pain when walking and feels like he was walking with a limp.  He denies weakness or foot drop.    For pain he is also taking gabapentin 3 times a day and as needed ibuprofen.    He did physical therapy earlier this year.  He has been working on the exercises on his own.    Last week he had 1 bowel accident at work.  He has been noticing frequent formed bowel movements, but no loose stools.  He is not on a bowel regimen.  Since that episode he has not had any bowel incontinence.  He denies bladder incontinence, saddle anesthesia, fevers or chills.  He endorses off-and-on night sweats at baseline.      PRIOR INJURIES/TREATMENT:   Ice/Heat: heat pad    Physical Therapy:   none.  Has been working on his own home exercises.     - Current Pain Medications -   Gabapentin 600 mg 1-2 x/day  Tylenol  Flexeril prn     - Prior pain medications -  Lidoderm patch  Medrol Dosepak 9/22/2023 and 10/3/2023  OTC NSAIDs: ibuprofen      Prior Procedures:  Date    Procedure   Improvement (%)  10/04/23 Left L5 and S1 TFESI           Prior Related Surgery: none   Other (acupuncture, OMT, CMM, TENS, DME, etc.):   Chiropractor in the past    Specialists Seen - (with most recent, available notes and clinic visits reviewed)   1. Neurosurgery - Dr Jay  2. [facility] - Lisseth Grove NP      IMAGING - reviewed   MR LUMBAR SPINE WITHOUT CONTRAST Open MRI at [facility] - 3/2/23  INTERPRETATION: 5 lumbar levels in lordotic alignment. Conus medullaris terminates at L1-2 and has normal signal. No evidence of arachnoid disease or abnormal neural development. Vertebral body heights are maintained. Paraspinal soft tissues appear normal. Visualized sacroiliac joints and hip joints are grossly normal.    L5-S1: Mild disc degeneration and chronic Schmorl's nodes, 3 mm retrolisthesis, central 6 mm disc herniation encroaches/impinges left greater than right S1 nerve roots, normal facet joints. No central stenosis.  Mild left foraminal stenosis.    L4-5: Mild disc degeneration posteriorly, disc bulge and left posterolateral T2 hyperintense 4 mm extruded disc herniation impinges left L5 (series 10 image 10, sagittal images 10-11), right subarticular recess stenosis, normal facet joints. No central or foraminal stenosis.    L3-4: No central or foraminal stenosis.    L2-3: Disc dehydration, right paracentral 2 mm disc protrusion, also superiorly migrating left foraminal 2-3 mm AP extruded/sequestered disc herniation contacts left L2 (series 6 image 11, series 10 image 29), normal facet joints. No central or foraminal stenosis.    T12-L1, L1-2: No central or foraminal stenosis.    CONCLUSION: Multilevel spondylosis with the following notable findings:    1. L5-S1 6 mm disc herniation encroaches/impinges left greater than right S1 nerve roots.    2. L4-5 left posterolateral 4 mm AP extruded herniation impinges left L5.    3. L2-3 superiorly migrating left foraminal 2-3 mm extruded/sequestered disc herniation contacts left L2.    10/18/2021 x-ray lumbar spine shows minimal retrolisthesis at L5-S1 with relatively well preserved disc space and without evidence of anterolisthesis or spondylolysis.     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     He  has a past surgical history that includes no history of surgery.    Family History  His family history includes Cancer in his maternal grandmother and paternal grandmother; Depression in his mother; Diabetes in his mother; Gastrointestinal Disease in his father; Hypertension in his mother.     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  Mostly sedentary but does use sit/stand desk in office.  No heavy lifting involved.  His wife is a nurse working in the procedures area with [employer].    Current living situation: Lives with wife who is an RN at [employer].   He does not smoke, drink alcohol, or illicit drug use.        Current Medications:   He has a current medication list which includes the following prescription(s): amphetamine-dextroamphetamine, clobetasol, cyclobenzaprine, dupixent, gabapentin, gabapentin, gabapentin, triamcinolone, and venlafaxine.       Allergies:    -- No Known Drug Allergies     PHYSICAL EXAM  BP (!) 152/105   Pulse 105   SpO2 99%    General: Pleasant, straightforward, WDWN individual.  Mental Status: Pleasant, direct, appropriate mood and affect  Resp: breathing is unlabored without audible wheeze  Vascular: No visible cyanosis, no venous stasis changes  Heme: No visible ecchymosis or erythema on extremities  Skin: No notable rash    Neurologic:  Strength: All major muscle groups of the bilateral lower extremities have normal and symmetric muscle strength EXCEPT left EHL and DF weakenss    Antalgic gait    ASSESSMENT:  Antonio Burger is a very pleasant 48 year old male who presents with:    #.  Recurrence of left L5/S1 radiculopathy      PLAN:  -[place] paperwork reviewed and completed today in clinic  -X-rays completed 10/26/23.  I instructed him to schedule a follow-up visit with neurosurgery clinic to discuss next steps  -Follow up in [facility] as needed.     Ready to learn, no apparent learning barriers.  Education provided on treatment plan according to patient's preferred learning style.  Patient verbalizes understanding.   __________________________________     20 minutes spent on the date of the encounter doing chart review, history and exam, documentation and further activities per the note      Again, thank you for allowing me to participate in the care of your patient.      Sincerely,    Morena Olivarez MD

## 2023-10-30 NOTE — PROGRESS NOTES
PM&R Follow-Up Visit -     Date of Initial Visit: 2/17/2023  LOV: 10/19/23  TD: 10/30/2023      Recall: Antonio Burger is a 48 year old male who follows up for left lumbosacral radiculopathy    INTERVAL HISTORY:  Patient was last seen in the [facility] by Lisseth Grove NP.  Since last time, he was evaluated in the spine surgery clinic by Dr. Jay, 10/23/2023.  Additional imaging was ordered and completed last week.  He returns to our clinic to review some [place] paperwork that needs to be filled out due to time off work secondary to his ongoing symptoms, appointments, procedures    RECALL HISTORY OF PRESENT ILLNESS:  Antonio Burger is a 48 year old male who presents with a chief complaint of left lower extremity pain.     Pain began February 12, 2023 and is not associated with any particular trauma or inciting event.  He was in his usual state of health prior.  He reports that he is an otherwise relatively healthy 48-year-old individual with history of narcolepsy that has been well managed for many years.  Per chart review and discussion with patient, he had been seen in 2021 for acute right-sided low back pain in the neurosurgery clinic which had improved/resolved with minimal treatment or management (medrol dosepak and flexeril).  He denies any major chronic spine pain.  He did see a chiropractor a few days after onset of symptoms which did not help.  His wife is an RN at [employer] and referred him to our [facility]ine Clinic for further evaluation.    Currently he localizes pain to the left gluteal region with radiating symptoms to the thigh that is painful, he does not have significant or consistent leg symptoms, but endorses numbness/tingling in the foot (dorsum); described as cramping and sharp in nature. Pain is reported as 8/10 at rest today and up to 9/10 at worst in the last week. No particular aggravating factors; and improved with ibuprofen/aleve (helps with back). He does report  pain-related sleep disturbance - switched beds in his house with some improvement.  No bowel/bladder issues or saddle anesthesia.    Functional limitations: States that gait and ambulation have been disrupted due to pain and paresthesias.     9/27/2023:  Patient was last seen in clinic 2/17/2023 with Dr. Olivarez. At that visit, the plan was to start a Medrol Dosepak, start gabapentin, begin acute spine PT, and obtain an MRI of the lumbar spine.    He was seen today for follow-up via telehealth.  He reports a recent recurrent, atraumatic flare of back and left leg pain after reaching for something on a table. He had been prescribed another Medrol dose pack 9/22/2023 and advised follow-up in the spine clinic.    Today, he states he has completed half of the steroid pack so far.  His back is feeling 50% better since starting the steroid.  The steroid has not helped the symptoms in his left leg as of yet.    He has pain in the left buttocks extending down the posterior left thigh, and terminating before the knee.  In the prior flare of pain earlier this year, his left foot was also numb but that has not happened this time.      He describes the left buttock and leg pain as like a constant charley horse.  The pain comes on after a few seconds of standing.  He says it feels as though someone punched the left buttocks.  His sleep has been interrupted because he has to keep his leg elevated on cushions.      He had an accidental fall when trying to put on his pants in the bathroom.  He grabbed a towel rack, but the rack came off the wall and he fell.  Since then, he has been laying down for lower body dressing.    He has left leg pain when walking and feels like he was walking with a limp.  He denies weakness or foot drop.    For pain he is also taking gabapentin 3 times a day and as needed ibuprofen.    He did physical therapy earlier this year.  He has been working on the exercises on his own.    Last week he had 1 bowel  accident at work.  He has been noticing frequent formed bowel movements, but no loose stools.  He is not on a bowel regimen.  Since that episode he has not had any bowel incontinence.  He denies bladder incontinence, saddle anesthesia, fevers or chills.  He endorses off-and-on night sweats at baseline.      PRIOR INJURIES/TREATMENT:   Ice/Heat: heat pad    Physical Therapy:   none.  Has been working on his own home exercises.     - Current Pain Medications -   Gabapentin 600 mg 1-2 x/day  Tylenol  Flexeril prn     - Prior pain medications -  Lidoderm patch  Medrol Dosepak 9/22/2023 and 10/3/2023  OTC NSAIDs: ibuprofen      Prior Procedures:  Date    Procedure   Improvement (%)  10/04/23 Left L5 and S1 TFESI           Prior Related Surgery: none   Other (acupuncture, OMT, CMM, TENS, DME, etc.):   Chiropractor in the past    Specialists Seen - (with most recent, available notes and clinic visits reviewed)   1. Neurosurgery - Dr Jay  2. [facility] - Lisseth Grove NP      IMAGING - reviewed   MR LUMBAR SPINE WITHOUT CONTRAST Open MRI at [facility] - 3/2/23  INTERPRETATION: 5 lumbar levels in lordotic alignment. Conus medullaris terminates at L1-2 and has normal signal. No evidence of arachnoid disease or abnormal neural development. Vertebral body heights are maintained. Paraspinal soft tissues appear normal. Visualized sacroiliac joints and hip joints are grossly normal.    L5-S1: Mild disc degeneration and chronic Schmorl's nodes, 3 mm retrolisthesis, central 6 mm disc herniation encroaches/impinges left greater than right S1 nerve roots, normal facet joints. No central stenosis. Mild left foraminal stenosis.    L4-5: Mild disc degeneration posteriorly, disc bulge and left posterolateral T2 hyperintense 4 mm extruded disc herniation impinges left L5 (series 10 image 10, sagittal images 10-11), right subarticular recess stenosis, normal facet joints. No central or foraminal stenosis.    L3-4: No central or  foraminal stenosis.    L2-3: Disc dehydration, right paracentral 2 mm disc protrusion, also superiorly migrating left foraminal 2-3 mm AP extruded/sequestered disc herniation contacts left L2 (series 6 image 11, series 10 image 29), normal facet joints. No central or foraminal stenosis.    T12-L1, L1-2: No central or foraminal stenosis.    CONCLUSION: Multilevel spondylosis with the following notable findings:    1. L5-S1 6 mm disc herniation encroaches/impinges left greater than right S1 nerve roots.    2. L4-5 left posterolateral 4 mm AP extruded herniation impinges left L5.    3. L2-3 superiorly migrating left foraminal 2-3 mm extruded/sequestered disc herniation contacts left L2.    10/18/2021 x-ray lumbar spine shows minimal retrolisthesis at L5-S1 with relatively well preserved disc space and without evidence of anterolisthesis or spondylolysis.     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     He  has a past surgical history that includes no history of surgery.    Family History  His family history includes Cancer in his maternal grandmother and paternal grandmother; Depression in his mother; Diabetes in his mother; Gastrointestinal Disease in his father; Hypertension in his mother.     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  Mostly sedentary but does use sit/stand desk in office.  No heavy lifting involved.  His wife is a nurse working in the procedures area with  [employer].    Current living situation: Lives with wife who is an RN at [employer] [facility] [employer].   He does not smoke, drink alcohol, or illicit drug use.        Current Medications:   He has a current medication list which includes the following prescription(s): amphetamine-dextroamphetamine, clobetasol, cyclobenzaprine, dupixent, gabapentin,  gabapentin, gabapentin, triamcinolone, and venlafaxine.       Allergies:    -- No Known Drug Allergies     PHYSICAL EXAM  BP (!) 152/105   Pulse 105   SpO2 99%    General: Pleasant, straightforward, WDWN individual.  Mental Status: Pleasant, direct, appropriate mood and affect  Resp: breathing is unlabored without audible wheeze  Vascular: No visible cyanosis, no venous stasis changes  Heme: No visible ecchymosis or erythema on extremities  Skin: No notable rash    Neurologic:  Strength: All major muscle groups of the bilateral lower extremities have normal and symmetric muscle strength EXCEPT left EHL and DF weakenss    Antalgic gait    ASSESSMENT:  Antonio Burger is a very pleasant 48 year old male who presents with:    #.  Recurrence of left L5/S1 radiculopathy      PLAN:  -[place] paperwork reviewed and completed today in clinic  -X-rays completed 10/26/23.  I instructed him to schedule a follow-up visit with neurosurgery clinic to discuss next steps  -Follow up in [facility] as needed.     Ready to learn, no apparent learning barriers.  Education provided on treatment plan according to patient's preferred learning style.  Patient verbalizes understanding.   __________________________________  Morena Olivarez MD   Physical Medicine and Rehabilitation, Medical Spine     20 minutes spent on the date of the encounter doing chart review, history and exam, documentation and further activities per the note

## 2023-11-03 ENCOUNTER — TELEPHONE (OUTPATIENT)
Dept: NEUROSURGERY | Facility: CLINIC | Age: 48
End: 2023-11-03
Payer: COMMERCIAL

## 2023-11-14 DIAGNOSIS — L20.84 INTRINSIC ATOPIC DERMATITIS: ICD-10-CM

## 2023-11-20 RX ORDER — DUPILUMAB 300 MG/2ML
300 INJECTION, SOLUTION SUBCUTANEOUS
Qty: 4 ML | Refills: 11 | Status: SHIPPED | OUTPATIENT
Start: 2023-11-20 | End: 2023-12-04

## 2023-11-20 NOTE — TELEPHONE ENCOUNTER
dupilumab (DUPIXENT) 300 MG/2ML prefilled syringe   Last Written Prescription Date:  7/31/2023  Last Fill Quantity: 4,   # refills: 3  Last Office Visit : 7/31/2023  Future Office visit:  None    Routing refill request to provider for review/approval because:  Drug not on the [facility], P or [facility] refill protocol or controlled substance    Roseanna Schulz RN  Central Triage Red Flags/Med Refills

## 2023-11-29 LAB
ABO/RH(D): NORMAL
ANTIBODY SCREEN: NEGATIVE
SPECIMEN EXPIRATION DATE: NORMAL

## 2023-11-30 ENCOUNTER — OFFICE VISIT (OUTPATIENT)
Dept: NEUROSURGERY | Facility: CLINIC | Age: 48
End: 2023-11-30
Payer: COMMERCIAL

## 2023-11-30 ENCOUNTER — LAB (OUTPATIENT)
Dept: LAB | Facility: CLINIC | Age: 48
End: 2023-11-30
Payer: COMMERCIAL

## 2023-11-30 VITALS
HEIGHT: 69 IN | HEART RATE: 77 BPM | BODY MASS INDEX: 29.62 KG/M2 | SYSTOLIC BLOOD PRESSURE: 127 MMHG | OXYGEN SATURATION: 96 % | RESPIRATION RATE: 16 BRPM | DIASTOLIC BLOOD PRESSURE: 98 MMHG | WEIGHT: 200 LBS

## 2023-11-30 DIAGNOSIS — M51.16 LUMBAR DISC HERNIATION WITH RADICULOPATHY: ICD-10-CM

## 2023-11-30 DIAGNOSIS — M51.16 LUMBAR DISC HERNIATION WITH RADICULOPATHY: Primary | ICD-10-CM

## 2023-11-30 LAB
ALBUMIN SERPL BCG-MCNC: 4.3 G/DL (ref 3.5–5.2)
ANION GAP SERPL CALCULATED.3IONS-SCNC: 11 MMOL/L (ref 7–15)
APTT PPP: 27 SECONDS (ref 22–38)
BASOPHILS # BLD AUTO: 0.1 10E3/UL (ref 0–0.2)
BASOPHILS NFR BLD AUTO: 1 %
BUN SERPL-MCNC: 15.3 MG/DL (ref 6–20)
CALCIUM SERPL-MCNC: 9.6 MG/DL (ref 8.6–10)
CHLORIDE SERPL-SCNC: 105 MMOL/L (ref 98–107)
CREAT SERPL-MCNC: 1.21 MG/DL (ref 0.67–1.17)
DEPRECATED HCO3 PLAS-SCNC: 25 MMOL/L (ref 22–29)
EGFRCR SERPLBLD CKD-EPI 2021: 74 ML/MIN/1.73M2
EOSINOPHIL # BLD AUTO: 0.2 10E3/UL (ref 0–0.7)
EOSINOPHIL NFR BLD AUTO: 3 %
ERYTHROCYTE [DISTWIDTH] IN BLOOD BY AUTOMATED COUNT: 13.2 % (ref 10–15)
GLUCOSE SERPL-MCNC: 130 MG/DL (ref 70–99)
HBA1C MFR BLD: 5.5 %
HCT VFR BLD AUTO: 43.4 % (ref 40–53)
HGB BLD-MCNC: 14.8 G/DL (ref 13.3–17.7)
IMM GRANULOCYTES # BLD: 0 10E3/UL
IMM GRANULOCYTES NFR BLD: 0 %
INR PPP: 1.03 (ref 0.85–1.15)
LYMPHOCYTES # BLD AUTO: 1.9 10E3/UL (ref 0.8–5.3)
LYMPHOCYTES NFR BLD AUTO: 36 %
MCH RBC QN AUTO: 30.1 PG (ref 26.5–33)
MCHC RBC AUTO-ENTMCNC: 34.1 G/DL (ref 31.5–36.5)
MCV RBC AUTO: 88 FL (ref 78–100)
MONOCYTES # BLD AUTO: 0.4 10E3/UL (ref 0–1.3)
MONOCYTES NFR BLD AUTO: 8 %
NEUTROPHILS # BLD AUTO: 2.7 10E3/UL (ref 1.6–8.3)
NEUTROPHILS NFR BLD AUTO: 52 %
NRBC # BLD AUTO: 0 10E3/UL
NRBC BLD AUTO-RTO: 0 /100
PLATELET # BLD AUTO: 273 10E3/UL (ref 150–450)
POTASSIUM SERPL-SCNC: 3.6 MMOL/L (ref 3.4–5.3)
RBC # BLD AUTO: 4.92 10E6/UL (ref 4.4–5.9)
SODIUM SERPL-SCNC: 141 MMOL/L (ref 135–145)
WBC # BLD AUTO: 5.1 10E3/UL (ref 4–11)

## 2023-11-30 PROCEDURE — 99000 SPECIMEN HANDLING OFFICE-LAB: CPT | Performed by: PATHOLOGY

## 2023-11-30 PROCEDURE — 86901 BLOOD TYPING SEROLOGIC RH(D): CPT | Performed by: NEUROLOGICAL SURGERY

## 2023-11-30 PROCEDURE — 82306 VITAMIN D 25 HYDROXY: CPT | Performed by: NEUROLOGICAL SURGERY

## 2023-11-30 PROCEDURE — 85730 THROMBOPLASTIN TIME PARTIAL: CPT | Performed by: PATHOLOGY

## 2023-11-30 PROCEDURE — 86850 RBC ANTIBODY SCREEN: CPT | Performed by: NEUROLOGICAL SURGERY

## 2023-11-30 PROCEDURE — 85610 PROTHROMBIN TIME: CPT | Performed by: PATHOLOGY

## 2023-11-30 PROCEDURE — 36415 COLL VENOUS BLD VENIPUNCTURE: CPT | Performed by: PATHOLOGY

## 2023-11-30 PROCEDURE — 99214 OFFICE O/P EST MOD 30 MIN: CPT | Performed by: NEUROLOGICAL SURGERY

## 2023-11-30 PROCEDURE — 80048 BASIC METABOLIC PNL TOTAL CA: CPT | Performed by: PATHOLOGY

## 2023-11-30 PROCEDURE — 82040 ASSAY OF SERUM ALBUMIN: CPT | Performed by: PATHOLOGY

## 2023-11-30 PROCEDURE — 83036 HEMOGLOBIN GLYCOSYLATED A1C: CPT | Performed by: NEUROLOGICAL SURGERY

## 2023-11-30 PROCEDURE — 85025 COMPLETE CBC W/AUTO DIFF WBC: CPT | Performed by: PATHOLOGY

## 2023-11-30 NOTE — PATIENT INSTRUCTIONS
"Dr Jay has recommended surgery and you have indicated a willingness to move forward with scheduling.    A member of our scheduling team will call you soon to coordinate a date and time, and will help arrange a visit with our anesthesia doctors in \"PAC [facility]\" to screen for medical problems and to check blood tests prior to surgery.  Please be aware, we are currently scheduling about 4-6 weeks out.  Once a surgery date has been established our office will mail surgical information to your home.  If you have questions, please call Dr Pierre Jha' RN Care Coordinator at [phone]    "

## 2023-11-30 NOTE — NURSING NOTE
Chief Complaint   Patient presents with    RECHECK     Here for consult, confirmed with patient    Follow Up     .Lesly Hunt NRP

## 2023-11-30 NOTE — PROGRESS NOTES
Neurosurgery Clinic Note     Chief Complaint: left leg pain     History of Present Illness:  It was a pleasure to evaluate Antonio Burger in clinic today at the kind referral of ELICIA Silva CNP  [address] [place] [address].     Antonio Burger is a 48 year old male presenting with pain radiating from low back to left buttock and left leg posterior calf. Pain is cramping and occasionally burning, worse with movement and good relief lasting only for one day with left L5-S1, left S1-S2 TFESI by Dr. Olivarez 10/4/23. Started about 6 months ago. No prior spine surgery. Former smoker, quit years ago.  No right leg symptoms.              Past Medical History        Past Medical History:   Diagnosis Date    Anxiety      Depressive disorder      Lipoma of other specified sites        neck area- plans observation    Narcolepsy without cataplexy in conditions classified elsewhere      Tobacco use disorder quit on 12     Smokes 1 ppd. Started at 16 years            Past Surgical History         Past Surgical History:   Procedure Laterality Date    INJECT EPIDURAL TRANSFORAMINAL Left 10/4/2023     Procedure: Left L5-S1 and S1 transforaminal epidural steroid injection;  Surgeon: Morena Olivarez MD;  Location: [facility] OR    NO HISTORY OF SURGERY                Social History            Socioeconomic History    Marital status:     Number of children: 0    Years of education: 16   Occupational History    Occupation:     Occupation: Teacher       Employer: [employer]   Tobacco Use    Smoking status: Former       Packs/day: 1.00       Years: 13.00       Additional pack years: 0.00       Total pack years: 13.00       Types: Cigarettes       Quit date: 2011       Years since quittin.8    Smokeless tobacco: Never   Substance and Sexual Activity    Alcohol use: Yes       Comment: occ    Drug use: No    Sexual activity: Yes       Partners: Female   Other Topics Concern     "Parent/sibling w/ CABG, MI or angioplasty before 65F 55M? Yes       Comment:  mom passed at the age of 59 from heart attack         family history includes Cancer in his maternal grandmother and paternal grandmother; Depression in his mother; Diabetes in his mother; Gastrointestinal Disease in his father; Hypertension in his mother.         IMAGING per my own measurement and interpretation:  Xrays:NONE RECENT     MRI lumbar spine 3/2/23 obtained from [facility]: left L4-5, left L5-S1 disc herniations  with severe lateral recess stenosis affecting traversing L5 and S1 nerve roots          Resulted Imaging/Labs:  Bone Density:  XR Surgery PATTI L/T 5 Min Fluoro w Stills     Result Date: 10/4/2023  This exam was marked as non-reportable because it will not be read by a radiologist or a [place] non-radiologist provider.         Vitamin D:  Vitamin D Deficiency Screening Results:  No results found for: \"VITDT\"  No results found for: \"OWC192\", \"KJRU970\", \"OSEO68ZXRDK\", \"VITD3\", \"D2VIT\", \"D3VIT\", \"DTOT\", \"[id number]\", \"[id number]\", \"[id number]\", \"[id number]\", \"[id number]\", \"[id number]\"        Nutritional Status:  Estimated body mass index is 30.65 kg/m  as calculated from the following:    Height as of 10/19/23: 1.74 m (5' 8.5\").    Weight as of 10/19/23: 92.8 kg (204 lb 9.6 oz).     No results found for: \"ALBUMIN\"     Diabetes Screening:  No results found for: \"A1C\"     Nicotine Usage:  Former but quit years ago                     Physical Exam   BP (!) 162/106   Pulse 96   Wt 92.6 kg (204 lb 1 oz)   SpO2 99%   BMI 30.57 kg/m       Constitutional: Oriented to person, place, and time. Appears well-developed and well-nourished. Cooperative. No distress.     Neurological: alert and oriented to person, place, and time.   sensory deficit denies  Gait normal        Reflex Scores:        Tricep reflexes are 2+ on the right side and 2+ on the left side.       Bicep reflexes are 2+ on the right side and 2+ on the left " side.       Brachioradialis reflexes are 2+ on the right side and 2+ on the left side.       Patellar reflexes are 2+ on the right side and 2+ on the left side.       Achilles reflexes are 2+ on the right side and 0 on the left side.        STRENGTH LEFT RIGHT                                                    5 5         Hip Flexion       5       5   Knee Extension 5 5   Ankle Dorsiflexion 4 5   Extensor Hallucis Longus 4 5   Plantar Flexion 4 5   Foot eversion 4 5   Foot inversion 4 5     Single-leg standing calf-raise asymmetry with weakness on left  Heel-walk difficulty with keeping left foot dorsiflexed  Toe-walk difficulty with keeping left heel elevated      Skin: Skin is warm, dry and intact.   Psychiatric: Normal mood and affect. Speech is normal and behavior is normal.           ASSESSMENT:  Antonio Burger is a 48 year old male with left L5 radiculopathy from left L4-5 disc herniation with lateral recess stenosis and left S1 radiculopathy from left L5-S1 disc  herniation with lateral recess stenosis.     PLAN:     I showed Mr. Burger his MRI and explained the disc herniations causing compression of the nerves. Because he has clinical weakness on exam a significant time after onset of symptoms, I recommended surgery. I would not  proceed with any more physical therapy or steroid injections given the clinical weakness and the lack of duration of effect of last injection. CARMEN could increase risk of complication after spine surgery.     I discussed surgical risk including bleeding, infection, nerve root damage, failure to heal, CSF leak, weakness/paralysis, numbness, worsening pain or failure to improve including neuropathic pain, recurrence of problem, and potential for development of instability and need for further procedures or surgeries. I discussed the risks of general anesthesia including stroke, heart attack, pneumonia, prolonged intubation, blood clot, pulmonary embolism, blindness, pressure ulcer  or neuropathy, and urinary tract infection.     He would like to proceed with surgery: surgery order placed    Left lumbar 4-5, lumbar 5-sacral 1 hemilaminectomy and microdiskectomy, with microscope  Observation overnight due to multilevel surgery and patient has never had general anesthesia previously    Labs and PAC referral placed    Emerita Jay MD    [facility] Department of Neurosurgery  Office: [phone]    11/30/2023  8:25 AM      I spent 30  minutes in patient care with greater than 50% spent in counseling and/or coordination of care.

## 2023-11-30 NOTE — NURSING NOTE
Chief Complaint   Patient presents with    RECHECK     Here for consult, confirmed with patient     Jackie Anderson

## 2023-11-30 NOTE — LETTER
11/30/2023       RE: Antonio Bruger  [address]  [place] [address]       Dear Colleague,    Thank you for referring your patient, Antonio Burger, to the [facility] [place] [facility]. Please see a copy of my visit note below.    Neurosurgery Clinic Note     Chief Complaint: left leg pain     History of Present Illness:  It was a pleasure to evaluate Antonio Burger in clinic today at the kind referral of ELICIA Silva [facility]  [address] [place] [address].     Antonio Burger is a 48 year old male presenting with pain radiating from low back to left buttock and left leg posterior calf. Pain is cramping and occasionally burning, worse with movement and good relief lasting only for one day with left L5-S1, left S1-S2 TFESI by Dr. Olivarez 10/4/23. Started about 6 months ago. No prior spine surgery. Former smoker, quit years ago.  No right leg symptoms.         Past Medical History        Past Medical History:   Diagnosis Date    Anxiety      Depressive disorder      Lipoma of other specified sites  2007      neck area- plans observation    Narcolepsy without cataplexy in conditions classified elsewhere      Tobacco use disorder quit on 12/26/11     Smokes 1 ppd. Started at 16 years            Past Surgical History         Past Surgical History:   Procedure Laterality Date    INJECT EPIDURAL TRANSFORAMINAL Left 10/4/2023     Procedure: Left L5-S1 and S1 transforaminal epidural steroid injection;  Surgeon: Morena Olivarez MD;  Location: [facility] OR    NO HISTORY OF SURGERY                Social History            Socioeconomic History    Marital status:     Number of children: 0    Years of education: 16   Occupational History    Occupation:     Occupation: Teacher       Employer: [employer]   Tobacco Use    Smoking status: Former       Packs/day: 1.00       Years: 13.00       Additional pack years:  "0.00       Total pack years: 13.00       Types: Cigarettes       Quit date: 2011       Years since quittin.8    Smokeless tobacco: Never   Substance and Sexual Activity    Alcohol use: Yes       Comment: occ    Drug use: No    Sexual activity: Yes       Partners: Female   Other Topics Concern    Parent/sibling w/ CABG, MI or angioplasty before 65F 55M? Yes       Comment:  mom passed at the age of 59 from heart attack         family history includes Cancer in his maternal grandmother and paternal grandmother; Depression in his mother; Diabetes in his mother; Gastrointestinal Disease in his father; Hypertension in his mother.         IMAGING per my own measurement and interpretation:  Xrays:NONE RECENT     MRI lumbar spine 3/2/23 obtained from [facility]: left L4-5, left L5-S1 disc herniations  with severe lateral recess stenosis affecting traversing L5 and S1 nerve roots          Resulted Imaging/Labs:  Bone Density:  XR Surgery PATTI L/T 5 Min Fluoro w Stills     Result Date: 10/4/2023  This exam was marked as non-reportable because it will not be read by a radiologist or a [place] non-radiologist provider.         Vitamin D:  Vitamin D Deficiency Screening Results:  No results found for: \"VITDT\"  No results found for: \"WRC907\", \"AGUR926\", \"QRZO40TOUFF\", \"VITD3\", \"D2VIT\", \"D3VIT\", \"DTOT\", \"[id number]\", \"[id number]\", \"[id number]\", \"[id number]\", \"[id number]\", \"[id number]\"        Nutritional Status:  Estimated body mass index is 30.65 kg/m  as calculated from the following:    Height as of 10/19/23: 1.74 m (5' 8.5\").    Weight as of 10/19/23: 92.8 kg (204 lb 9.6 oz).     No results found for: \"ALBUMIN\"     Diabetes Screening:  No results found for: \"A1C\"     Nicotine Usage:  Former but quit years ago                     Physical Exam   BP (!) 162/106   Pulse 96   Wt 92.6 kg (204 lb 1 oz)   SpO2 99%   BMI 30.57 kg/m       Constitutional: Oriented to person, place, and time. Appears well-developed and " well-nourished. Cooperative. No distress.     Neurological: alert and oriented to person, place, and time.   sensory deficit denies  Gait normal        Reflex Scores:        Tricep reflexes are 2+ on the right side and 2+ on the left side.       Bicep reflexes are 2+ on the right side and 2+ on the left side.       Brachioradialis reflexes are 2+ on the right side and 2+ on the left side.       Patellar reflexes are 2+ on the right side and 2+ on the left side.       Achilles reflexes are 2+ on the right side and 0 on the left side.        STRENGTH LEFT RIGHT                                                    5 5         Hip Flexion       5       5   Knee Extension 5 5   Ankle Dorsiflexion 4 5   Extensor Hallucis Longus 4 5   Plantar Flexion 4 5   Foot eversion 4 5   Foot inversion 4 5     Single-leg standing calf-raise asymmetry with weakness on left  Heel-walk difficulty with keeping left foot dorsiflexed  Toe-walk difficulty with keeping left heel elevated      Skin: Skin is warm, dry and intact.   Psychiatric: Normal mood and affect. Speech is normal and behavior is normal.           ASSESSMENT:  Antonio Burger is a 48 year old male with left L5 radiculopathy from left L4-5 disc herniation with lateral recess stenosis and left S1 radiculopathy from left L5-S1 disc  herniation with lateral recess stenosis.     PLAN:     I showed Mr. Burger his MRI and explained the disc herniations causing compression of the nerves. Because he has clinical weakness on exam a significant time after onset of symptoms, I recommended surgery. I would not  proceed with any more physical therapy or steroid injections given the clinical weakness and the lack of duration of effect of last injection. CARMEN could increase risk of complication after spine surgery.     I discussed surgical risk including bleeding, infection, nerve root damage, failure to heal, CSF leak, weakness/paralysis, numbness, worsening pain or failure to improve  including neuropathic pain, recurrence of problem, and potential for development of instability and need for further procedures or surgeries. I discussed the risks of general anesthesia including stroke, heart attack, pneumonia, prolonged intubation, blood clot, pulmonary embolism, blindness, pressure ulcer or neuropathy, and urinary tract infection.     He would like to proceed with surgery: surgery order placed    Left lumbar 4-5, lumbar 5-sacral 1 hemilaminectomy and microdiskectomy, with microscope  Observation overnight due to multilevel surgery and patient has never had general anesthesia previously    Labs and PAC referral placed    11/30/2023  8:25 AM      I spent 30  minutes in patient care with greater than 50% spent in counseling and/or coordination of care.        Again, thank you for allowing me to participate in the care of your patient.      Sincerely,    Emerita Jay MD

## 2023-12-01 ENCOUNTER — TELEPHONE (OUTPATIENT)
Dept: NEUROSURGERY | Facility: CLINIC | Age: 48
End: 2023-12-01
Payer: COMMERCIAL

## 2023-12-01 NOTE — TELEPHONE ENCOUNTER
FUTURE VISIT INFORMATION      SURGERY INFORMATION:  Date: 23  Location: [facility] OR  Surgeon:  Emerita Jay MD  Anesthesia Type:  General  Procedure: Left lumbar 4-5, lumbar 5-sacral 1 hemilaminectomy and microdiskectomy, with microscope  Consult: 10/23/23    RECORDS REQUESTED FROM:       Primary Care Provider: No PCP     Pertinent Medical History: Tobacco Abuse    Most recent EKG+ Tracin16

## 2023-12-01 NOTE — TELEPHONE ENCOUNTER
Patient is scheduled for surgery with Dr. Jay    Spoke with: Patient    Date of Surgery: 12/12/23    Location: [place]    Post op: 2 weeks    Pre op with Provider: Complete    H&P: Scheduled with PAC 12/4/23    Additional imaging/appointments: N/A    Surgery packet: Mailed to patient     Additional comments: N/A        Ольга Fraser MA on 12/1/2023 at 1:27 PM

## 2023-12-04 ENCOUNTER — ANESTHESIA EVENT (OUTPATIENT)
Dept: SURGERY | Facility: CLINIC | Age: 48
End: 2023-12-04
Payer: COMMERCIAL

## 2023-12-04 ENCOUNTER — VIRTUAL VISIT (OUTPATIENT)
Dept: SURGERY | Facility: CLINIC | Age: 48
End: 2023-12-04
Payer: COMMERCIAL

## 2023-12-04 ENCOUNTER — OFFICE VISIT (OUTPATIENT)
Dept: FAMILY MEDICINE | Facility: CLINIC | Age: 48
End: 2023-12-04
Payer: COMMERCIAL

## 2023-12-04 ENCOUNTER — PRE VISIT (OUTPATIENT)
Dept: SURGERY | Facility: CLINIC | Age: 48
End: 2023-12-04

## 2023-12-04 DIAGNOSIS — L20.84 INTRINSIC ATOPIC DERMATITIS: ICD-10-CM

## 2023-12-04 DIAGNOSIS — L30.9 DERMATITIS: Primary | ICD-10-CM

## 2023-12-04 DIAGNOSIS — Z01.818 PRE-OP EVALUATION: Primary | ICD-10-CM

## 2023-12-04 LAB
DEPRECATED CALCIDIOL+CALCIFEROL SERPL-MC: <33 UG/L (ref 20–75)
VITAMIN D2 SERPL-MCNC: <5 UG/L
VITAMIN D3 SERPL-MCNC: 28 UG/L

## 2023-12-04 PROCEDURE — 99203 OFFICE O/P NEW LOW 30 MIN: CPT | Mod: VID | Performed by: PHYSICIAN ASSISTANT

## 2023-12-04 PROCEDURE — 99214 OFFICE O/P EST MOD 30 MIN: CPT | Performed by: PHYSICIAN ASSISTANT

## 2023-12-04 RX ORDER — DUPILUMAB 300 MG/2ML
300 INJECTION, SOLUTION SUBCUTANEOUS
Qty: 4 ML | Refills: 11 | Status: SHIPPED | OUTPATIENT
Start: 2023-12-04

## 2023-12-04 ASSESSMENT — PAIN SCALES - GENERAL
PAINLEVEL: NO PAIN (0)
PAINLEVEL: NO PAIN (0)

## 2023-12-04 ASSESSMENT — LIFESTYLE VARIABLES: TOBACCO_USE: 1

## 2023-12-04 ASSESSMENT — ENCOUNTER SYMPTOMS: SEIZURES: 0

## 2023-12-04 NOTE — PROGRESS NOTES
Antonio is a 48 year old who is being evaluated via a billable video visit.      How would you like to obtain your AVS? MyChart  If the video visit is dropped, the invitation should be resent by: Text to cell phone: [phone]  HPI           Review of Systems           Physical Exam

## 2023-12-04 NOTE — LETTER
12/4/2023         RE: Antonio Burger  [address] [place] [address]        Dear Colleague,    Thank you for referring your patient, Antonio Burger, to the [facility]. Please see a copy of my visit note below.    [facility] Dermatology Note  Encounter Date: Dec 4, 2023  Office Visit      Dermatology Problem List:  # Nummular dermatitis / atopic dermatitis, bilateral lower extremities, upper extremities, hands. S/p bx 10/19.   - dupixent started 10/19 with near complete resolution  - triam 0.1% ointment /  clobetasol 0.05% ointment BID PRN for legs / arms  - desonide cream BID PRN for face  - previous rx: oral prednisone  # Lipoma, posterior neck, s/p excision    FBSE 5/3/2022  ____________________________________________    Assessment & Plan:  #  Nummular/atopic dermatitis.: Very well controlled on dupixent with minimal side effects.   - Continue dupixent 300 mg q2 weeks; 1-year refill provided  - Discussed the importance of being consistent with of use of Dupixent.   - Can use triam 0.1% ointment BID PRN flares; has not needed recently  - Continue diligent moisturization     Plan for FBSE at next visit    Procedures Performed:   None    Follow-up: 1 year(s) in-person, or earlier for new or changing lesions    Staff and scribe     Scribe Disclosure:   I, MARBELLA MOORE, am serving as a scribe; to document services personally performed by Maura Gil PA-C -based on data collection and the provider's statements to me.     Provider Disclosure:  I agree with above History, Review of Systems, Physical exam and Plan.  I have reviewed the content of the documentation and have edited it as needed. I have personally performed the services documented here and the documentation accurately represents those services and the decisions I have made.      Electronically signed by:    All risks, benefits and alternatives were discussed with patient.  Patient is in agreement  and understands the assessment and plan.  All questions were answered.    Maura Gil PA-C, MPAS  [facility]: Phone: [phone], Fax: [phone]  [facility]: Phone: [phone],  Fax: [phone]  [facility]: Phone: [phone], Fax: [phone]  ____________________________________________    CC: Eczema (Follow-up/)      Reviewed patients past medical history and pertinent chart review prior to patient's visit today.     HPI:  Mr. Antonio Burger is a 48 year old male who presents today as a return patient for [facility]. Previous patient of Dr. Spivey. Reviewed last visit performed on 5/3/22 with Dr. Spivey.     Today reported that his Eczema is well controlled. Patient requested a refill on Dupixent. Uses Triamcinolone as needed. Deferred refill today.     Denied any recently diagnosed medical hx.     Patient is otherwise feeling well, without additional concerns.    Labs:  N/A    Physical Exam:  Vitals: There were no vitals taken for this visit.  SKIN: No concerns: Eczema well controlled. Sun exposed skin only - face and dorsal hands and ankles  - no active rash on exam  - No other lesions of concern on areas examined.     Medications:  Current Outpatient Medications   Medication     amphetamine-dextroamphetamine (ADDERALL) 30 MG tablet     clobetasol (TEMOVATE) 0.05 % external ointment     cyclobenzaprine (FLEXERIL) 5 MG tablet     dupilumab (DUPIXENT) 300 MG/2ML prefilled syringe     gabapentin (NEURONTIN) 300 MG capsule     gabapentin (NEURONTIN) 300 MG capsule     gabapentin (NEURONTIN) 600 MG tablet     triamcinolone (KENALOG) 0.1 % external ointment     venlafaxine (EFFEXOR XR) 37.5 MG 24 hr capsule     No current facility-administered medications for this visit.      Past Medical/Surgical History:   Patient Active Problem List   Diagnosis     CARDIOVASCULAR SCREENING; LDL GOAL LESS THAN 160      Cellulitis     Tobacco abuse     Nummular eczema     Nummular dermatitis     Lumbar radiculopathy     Acute left-sided low back pain with left-sided sciatica     Lumbar disc herniation with radiculopathy     Past Medical History:   Diagnosis Date     Anxiety      Depressive disorder      Lipoma of other specified sites  2007     neck area- plans observation     Narcolepsy without cataplexy in conditions classified elsewhere      Tobacco use disorder quit on 12/26/11    Smokes 1 ppd. Started at 16 years                        Again, thank you for allowing me to participate in the care of your patient.        Sincerely,        Maura Gil PA-C

## 2023-12-04 NOTE — PROGRESS NOTES
[facility] Dermatology Note  Encounter Date: Dec 4, 2023  Office Visit      Dermatology Problem List:  # Nummular dermatitis / atopic dermatitis, bilateral lower extremities, upper extremities, hands. S/p bx 10/19.   - dupixent started 10/19 with near complete resolution  - triam 0.1% ointment /  clobetasol 0.05% ointment BID PRN for legs / arms  - desonide cream BID PRN for face  - previous rx: oral prednisone  # Lipoma, posterior neck, s/p excision    FBSE 5/3/2022  ____________________________________________    Assessment & Plan:  #  Nummular/atopic dermatitis.: Very well controlled on dupixent with minimal side effects.   - Continue dupixent 300 mg q2 weeks; 1-year refill provided  - Discussed the importance of being consistent with of use of Dupixent.   - Can use triam 0.1% ointment BID PRN flares; has not needed recently  - Continue diligent moisturization     Plan for FBSE at next visit    Procedures Performed:   None    Follow-up: 1 year(s) in-person, or earlier for new or changing lesions    Staff and scribe     Scribe Disclosure:   I, MARBELLA MOORE, am serving as a scribe; to document services personally performed by Maura Gil PA-C -based on data collection and the provider's statements to me.     Provider Disclosure:  I agree with above History, Review of Systems, Physical exam and Plan.  I have reviewed the content of the documentation and have edited it as needed. I have personally performed the services documented here and the documentation accurately represents those services and the decisions I have made.      Electronically signed by:    All risks, benefits and alternatives were discussed with patient.  Patient is in agreement and understands the assessment and plan.  All questions were answered.    Maura Gil PA-C, MPAS  [facility]: Phone: [phone], Fax: [phone]  [facility]  [place]: Phone: [phone],  Fax: [phone]  [facility]: Phone: [phone], Fax: [phone]  ____________________________________________    CC: Eczema (Follow-up/)      Reviewed patients past medical history and pertinent chart review prior to patient's visit today.     HPI:  Mr. Antonio Burger is a 48 year old male who presents today as a return patient for [facility]. Previous patient of Dr. Spivey. Reviewed last visit performed on 5/3/22 with Dr. Spivey.     Today reported that his Eczema is well controlled. Patient requested a refill on Dupixent. Uses Triamcinolone as needed. Deferred refill today.     Denied any recently diagnosed medical hx.     Patient is otherwise feeling well, without additional concerns.    Labs:  N/A    Physical Exam:  Vitals: There were no vitals taken for this visit.  SKIN: No concerns: Eczema well controlled. Sun exposed skin only - face and dorsal hands and ankles  - no active rash on exam  - No other lesions of concern on areas examined.     Medications:  Current Outpatient Medications   Medication    amphetamine-dextroamphetamine (ADDERALL) 30 MG tablet    clobetasol (TEMOVATE) 0.05 % external ointment    cyclobenzaprine (FLEXERIL) 5 MG tablet    dupilumab (DUPIXENT) 300 MG/2ML prefilled syringe    gabapentin (NEURONTIN) 300 MG capsule    gabapentin (NEURONTIN) 300 MG capsule    gabapentin (NEURONTIN) 600 MG tablet    triamcinolone (KENALOG) 0.1 % external ointment    venlafaxine (EFFEXOR XR) 37.5 MG 24 hr capsule     No current facility-administered medications for this visit.      Past Medical/Surgical History:   Patient Active Problem List   Diagnosis    CARDIOVASCULAR SCREENING; LDL GOAL LESS THAN 160    Cellulitis    Tobacco abuse    Nummular eczema    Nummular dermatitis    Lumbar radiculopathy    Acute left-sided low back pain with left-sided sciatica    Lumbar disc herniation with radiculopathy     Past Medical History:   Diagnosis Date    Anxiety      Depressive disorder     Lipoma of other specified sites  2007     neck area- plans observation    Narcolepsy without cataplexy in conditions classified elsewhere     Tobacco use disorder quit on 12/26/11    Smokes 1 ppd. Started at 16 years

## 2023-12-04 NOTE — PATIENT INSTRUCTIONS
Preparing for Your Surgery      Name:  Antonio Burger   MRN:  [id number]   :  1975   Today's Date:  2023       Arriving for surgery:  Surgery date:  23  Arrival time:  12:40 pm  Surgery time: 2:40 pm    Please come to:     Please come to:      [facility] [address]  [address] [place]  [address]  The Green Ramp for patients and visitors is located beneath the [facility]. The parking facility entrance is at the intersection of [address] [place] [address] and [address]. Patients and visitors who self-park will receive the reduced hospital parking rate (no ticket validation needed).  [employer] parking, located at the [facility] main entrance on [address] [place] [address], is available Monday - Friday from 7 am to 3:30 pm.  Discounted parking pass options can be purchased from  attendants during business hours.  -Check in at the security desk in the [facility] ([facility]) Lobby. They will direct you to the correct elevators.  -Proceed to the 3rd floor, check in at the Adult Surgery Waiting Lounge. [phone]  If you are in need of directions, a wheelchair or escort please stop at the Information Desk in the lobby.  Inform the information person that you are here for surgery; a wheelchair and escort to [address] will be provided.   An escort to the Adult Surgery Waiting Lounge will be provided.    What can I eat or drink?  -  You may eat and drink normally up to 8 hours prior to arrival time. (Until 4:40 am)  -  You may have clear liquids until 2 hours prior to arrival time. (Until 10:40 am)    Examples of clear liquids:  Water  Clear broth  Juices (apple, white grape, white cranberry  and cider) without pulp  Noncarbonated, powder based beverages  (lemonade and Wes-Aid)  Sodas (Sprite, 7-Up, ginger ale and seltzer)  Coffee or tea (without milk or cream)  Gatorade    -   No Alcohol or cannabis products for at least 24 hours before surgery.     Which medicines can I take?    Hold Aspirin for 7 days before surgery.   Hold Multivitamins for 7 days before surgery.  Hold Supplements for 7 days before surgery.  Hold Ibuprofen (Advil, Motrin) for 1 day(s) before surgery--unless otherwise directed by surgeon.  Hold Naproxen (Aleve) for 4 days before surgery.    -  DO NOT take these medications the day of surgery:  Adderall  Ointments/lotions/gels    -  PLEASE TAKE these medications per your usual routine:  Flexeril if needed  Gabapentin  Venlafaxine (Effexor)    How do I prepare myself?  - Please take 2 showers (one the night prior to surgery and one the morning of surgery) using Scrubcare or Hibiclens soap.    Use this soap only from the neck to your toes.     Leave the soap on your skin for one minute--then rinse thoroughly.      You may use your own shampoo and conditioner. No other hair products.   - Please remove all jewelry and body piercings.  - No lotions, deodorants or fragrance.  - No makeup or fingernail polish.   - Bring your ID and insurance card.    -If you use a CPAP machine, please bring the CPAP machine, tubing, and mask to hospital.    -If you have a Deep Brain Stimulator, Spinal Cord Stimulator, or any Neuro Stimulator device---you must bring the remote control to the hospital.      ALL PATIENTS GOING HOME THE SAME DAY OF SURGERY ARE REQUIRED TO HAVE A RESPONSIBLE ADULT TO DRIVE AND BE IN ATTENDANCE WITH THEM FOR 24 HOURS FOLLOWING SURGERY.    Covid testing policy as of 12/06/2022  Your surgeon will notify and schedule you for a COVID test if one is needed before surgery--please direct any questions or COVID symptoms to your surgeon      Questions or Concerns:    - For any questions regarding the day of surgery or your hospital stay, please contact the Pre Admission Nursing Office at [phone].       - If you have health changes between today and your surgery, please  call your surgeon.       - For questions after surgery, please call your surgeons office.           Current Visitor Guidelines    You may have 2 visitors in the pre op area.    Visiting hours: 8 a.m. to 8:30 p.m.    You may have four visitors during your inpatient hospital stay.    Patients confirmed or suspected to have symptoms of COVID 19 or flu:     No visitors allowed for adult patients.   Children (under age 18) can have 1 named visitor.     People who are sick or showing symptoms of COVID 19 or flu:    Are not allowed to visit patients--we can only make exceptions in special situations.       Please follow these guidelines for your visit:          Please maintain social distance          Masking is optional--however at times you may be asked to wear a mask for the safety of yourself and others     Clean your hands with alcohol hand . Do this when you arrive at and leave the building and patient room,    And again after you touch your mask or anything in the room.     Go directly to and from the room you are visiting.     Stay in the patient s room during your visit. Limit going to other places in the hospital as much as possible     Leave bags and jackets at home or in the car.     For everyone s health, please don t come and go during your visit. That includes for smoking   during your visit.

## 2023-12-04 NOTE — H&P
Pre-Operative H & P     CC:  Preoperative exam to assess for increased cardiopulmonary risk while undergoing surgery and anesthesia.    Date of Encounter: 12/4/2023  Primary Care Physician:  Lyndsey Arias     Reason for visit:   Encounter Diagnosis   Name Primary?    Pre-op evaluation Yes       HPI  Antonio Burger is a 48 year old male who presents for pre-operative H & P in preparation for  Procedure Information       Case: [id number] Date/Time: 12/12/23 1440    Procedure: Left lumbar 4-5, lumbar 5-sacral 1 hemilaminectomy and microdiskectomy, with microscope (Left: Spine)    Anesthesia type: General    Diagnosis: Lumbar disc herniation with radiculopathy [M51.16]    Pre-op diagnosis: Lumbar disc herniation with radiculopathy [M51.16]    Location:  OR  /  OR    Providers: Emerita Jay MD            Patient is being evaluated for comorbid conditions of former tobacco use, depression, anxiety, narcolepsy    Mr. Burger has pain radiating from the low back to left buttock and left posterior calf. He was seen by Dr. Jay and is now scheduled for the above procedure.     History is obtained from the patient and chart review    Hx of abnormal bleeding or anti-platelet use: denies      Past Medical History  Past Medical History:   Diagnosis Date    Anxiety     Depressive disorder     Lipoma of other specified sites  2007     neck area- plans observation    Narcolepsy without cataplexy in conditions classified elsewhere     Tobacco use disorder quit on 12/26/11    Smokes 1 ppd. Started at 16 years       Past Surgical History  Past Surgical History:   Procedure Laterality Date    INJECT EPIDURAL TRANSFORAMINAL Left 10/4/2023    Procedure: Left L5-S1 and S1 transforaminal epidural steroid injection;  Surgeon: Morena Olivarez MD;  Location: [facility] OR    NO HISTORY OF SURGERY         Prior to Admission Medications  Current Outpatient Medications   Medication Sig Dispense Refill     amphetamine-dextroamphetamine (ADDERALL) 30 MG tablet Take 30 mg by mouth 4 times daily      clobetasol (TEMOVATE) 0.05 % external ointment Apply twice daily as needed for itchy spots on legs. 60 g 5    cyclobenzaprine (FLEXERIL) 5 MG tablet Take 1 tablet (5 mg) by mouth 3 times daily as needed for muscle spasms 30 tablet 0    dupilumab (DUPIXENT) 300 MG/2ML prefilled syringe Inject 2 mLs (300 mg) Subcutaneous every 14 days 4 mL 11    gabapentin (NEURONTIN) 600 MG tablet Take 1 tablet (600 mg) by mouth 3 times daily According to the chart provided 90 tablet 3    triamcinolone (KENALOG) 0.1 % external ointment Apply twice daily to any itchy areas on hands, arms, or legs. 80 g 11    venlafaxine (EFFEXOR XR) 37.5 MG 24 hr capsule Take 37.5 mg by mouth every morning      gabapentin (NEURONTIN) 300 MG capsule Take 2 capsules (600 mg) by mouth every morning AND 3 capsules (900 mg) at bedtime. (Patient not taking: Reported on 2023) 150 capsule 3    gabapentin (NEURONTIN) 300 MG capsule Take 1 capsule (300 mg) by mouth 3 times daily (Patient not taking: Reported on 2023) 90 capsule 3       Allergies  Allergies   Allergen Reactions    No Known Drug Allergy        Social History  Social History     Socioeconomic History    Marital status:      Spouse name: Not on file    Number of children: 0    Years of education: 16    Highest education level: Not on file   Occupational History    Occupation:     Occupation: Teacher     Employer: [employer]   Tobacco Use    Smoking status: Former     Packs/day: 1.00     Years: 13.00     Additional pack years: 0.00     Total pack years: 13.00     Types: Cigarettes     Quit date: 2011     Years since quittin.9    Smokeless tobacco: Never   Substance and Sexual Activity    Alcohol use: Not Currently     Comment: occ    Drug use: No    Sexual activity: Yes     Partners: Female   Other Topics Concern    Parent/sibling w/ CABG, MI or angioplasty  before 65F 55M? Yes     Comment:  mom passed at the age of 59 from heart attack   Social History Narrative    Not on file     Social Determinants of Health     Financial Resource Strain: Not on file   Food Insecurity: Not on file   Transportation Needs: Not on file   Physical Activity: Not on file   Stress: Not on file   Social Connections: Not on file   Interpersonal Safety: Not on file   Housing Stability: Not on file       Family History  Family History   Problem Relation Age of Onset    Diabetes Mother         type 2    Hypertension Mother     Depression Mother     Gastrointestinal Disease Father         reflux    Cancer Maternal Grandmother         stomach    Cancer Paternal Grandmother         ?    Anesthesia Reaction No family hx of     Deep Vein Thrombosis (DVT) No family hx of        Review of Systems  The complete review of systems is negative other than noted in the HPI or here.   Anesthesia Evaluation   Pt has not had prior anesthetic         ROS/MED HX  ENT/Pulmonary:     (+)     XAVIER risk factors,  hypertension,         tobacco use, Past use,                      Neurologic: Comment: Lumbar disc herniation    (-) no seizures and no CVA   Cardiovascular:     (+)  - -   -  - -                                 Previous cardiac testing   Echo: Date: Results:    Stress Test:  Date: Results:    ECG Reviewed:  Date: 2016 Results:  SR  Cath:  Date: Results:   (-) taking anticoagulants/antiplatelets   METS/Exercise Tolerance: >4 METS Comment: Limited due to back pain. Rowing machine, peleton, walking dogs   Hematologic:  - neg hematologic  ROS  (-) history of blood clots and history of blood transfusion   Musculoskeletal: Comment: Back pain       GI/Hepatic:  - neg GI/hepatic ROS  (-) GERD   Renal/Genitourinary:  - neg Renal ROS     Endo:  - neg endo ROS  (-) chronic steroid usage   Psychiatric/Substance Use:     (+) psychiatric history anxiety and depression       Infectious Disease:  - neg infectious disease  ROS     Malignancy:  - neg malignancy ROS     Other:            Virtual visit -  No vitals were obtained    Physical Exam  Constitutional: Awake, alert, cooperative, no apparent distress, and appears stated age.  HENT: Normocephalic  Respiratory: non labored breathing   Neurologic: Awake, alert, oriented to name, place and time.   Neuropsychiatric: Calm, cooperative. Normal affect.      Prior Labs/Diagnostic Studies   All labs and imaging personally reviewed     EKG/ stress test - if available please see in ROS above   No results found.       No data to display                  The patient's records and results personally reviewed by this provider.     Outside records reviewed from: Care Everywhere      Assessment    Antonio Burger is a 48 year old male seen as a PAC referral for risk assessment and optimization for anesthesia.    Plan/Recommendations  Pt will be optimized for the proposed procedure.  See below for details on the assessment, risk, and preoperative recommendations    NEUROLOGY  - No history of TIA, CVA or seizure  -lumbar disc herniation with the above procedure planned   -Post Op delirium risk factors:  No risk identified    ENT  - No current airway concerns.  Will need to be reassessed day of surgery.  Mallampati: Unable to assess  TM: Unable to assess    CARDIAC  - No history of CAD and Afib  -denies cardiac history or symptoms. Reports he was active walking dog, riding peleton, riding outdoor bike, etc. This summer. Now limited due to back pain. Denies exertional symptoms  -last few clinic visits with elevated BP. He reports his wife is a nurse and they have a BP cuff at home. He will check home BP over next few days and we will call to check in on home readings. Will set him up with PCP for hypertension management if elevated at home over next few days.   - METS (Metabolic Equivalents)  Patient performs 4 or more METS exercise without symptoms            Total Score: 0      RCRI-Very low risk:  "Class 1 0.4% complication rate            Total Score: 0        PULMONARY  XAVIER Low Risk  *This score is based on incomplete data *           Total Score: 1*    XAVIER: Male        Criteria with incomplete data:    XAVIER: Hypertension      - Denies asthma or inhaler use  - Tobacco History    History   Smoking Status    Former    Packs/day: 1.00    Years: 13.00    Types: Cigarettes    Quit date: 12/27/2011   Smokeless Tobacco    Never       GI  PONV Medium Risk  Total Score: 2           1 AN PONV: Patient is not a current smoker    1 AN PONV: Intended Post Op Opioids        /RENAL  - Baseline Creatinine 1.21 11/30/23. Will stay well hydrated and recheck later this week.      ENDOCRINE    - BMI: Estimated body mass index is 29.97 kg/m  as calculated from the following:    Height as of 11/30/23: 1.74 m (5' 8.5\").    Weight as of 11/30/23: 90.7 kg (200 lb).  Overweight (BMI 25.0-29.9)  - No history of Diabetes Mellitus    HEME  VTE Low Risk 0.5%            Total Score: 2    VTE: Male      - No history of abnormal bleeding or antiplatelet use.      Different anesthesia methods/types have been discussed with the patient, but they are aware that the final plan will be decided by the assigned anesthesia provider on the date of service.    The patient is optimized for their procedure. AVS with information on surgery time/arrival time, meds and NPO status given by nursing staff. No further diagnostic testing indicated.    Please refer to the physical examination documented by the anesthesiologist in the anesthesia record on the day of surgery.      Addendum, Dr. Bowers, 12/11/23: I was asked to review this patient's history related to elevated Cr by PAC BRINDA, prior to his surgery with Dr. Jay tomorrow. His preop Cr was 1.21, which is elevated without prior diagnosis of CKD. He was asked to hydrate, and recheck 1 week later was still 1.25. He then saw his PCP for evaluation before surgery, who noted daily ibuprofen use, untreated " HTN (-140s), and poor hydration as likely contributing factors. He was started on valsartan for BP control, instructed to hydrate, and has stopped NSAIDs prior to this surgery. He was noted to have mild proteinuria on urine studies.     I called Dr. Ag, who is AIC tomorrow, to discuss. We agree it would be reasonable to check a Cr in preop tomorrow, and proceed if it is stable (would recommend avoiding hypotension and providing fluid reuscitation, avoiding nephrotoxic agents). However, if it is significantly elevated   despite recent risk factor modifications, then there should be a clear risk/benefit discussion with patient and surgeon regarding postponing for further evaluation/optimization if the surgery is non-urgent. The final plan would be per day of surgery anesthesiologist and surgeon.     I discussed this with Antonio by phone call today as well. He has been hydrating, started ARB this past weekend, and has not been taking NSAIDs. I reviewed that he has some risk factors for worsened kidney function (acute kidney injury) perioperatively, in the setting of elevated Cr and HTN. As it stands, he understands these risks and would like to proceed (final consent per anesthesiologist on day of surgery). He is in agreement with the plan above. I asked him to be mindful about hydrating well today. He reports that his PCP has already made plans to keep in close contact/followup after surgery to monitor renal function.     I received a message from patient's new PCP- no concerns about proceeding with surgery as scheduled     Video-Visit Details    Type of service:  Video Visit    Provider received verbal consent for a Video Visit from the patient? Yes     Originating Location (pt. Location): Home    Distant Location (provider location):  Off-site  Mode of Communication:  Video Conference via Suitey  On the day of service:     Prep time: 10 minutes  Visit time: 16 minutes  Documentation time: 8  minutes  ------------------------------------------  Total time: 34 minutes      Rosina Muñiz PA-C  Preoperative Assessment Center  [facility]  [facility]  Phone: [phone]  Fax: [phone]

## 2023-12-05 ENCOUNTER — PATIENT OUTREACH (OUTPATIENT)
Dept: NEUROSURGERY | Facility: CLINIC | Age: 48
End: 2023-12-05
Payer: COMMERCIAL

## 2023-12-05 NOTE — PROGRESS NOTES
Pre-op packet sent via US Mail with instructions to call upon receipt.    Procedure: Left L4-5, L5-S1 Hemilami/Microdiscectomy    PAC: 1/4/23  DOS: 12/12/23    Provider: Pierre

## 2023-12-07 ENCOUNTER — ANCILLARY PROCEDURE (OUTPATIENT)
Dept: GENERAL RADIOLOGY | Facility: CLINIC | Age: 48
End: 2023-12-07
Attending: NEUROLOGICAL SURGERY
Payer: COMMERCIAL

## 2023-12-07 ENCOUNTER — LAB (OUTPATIENT)
Dept: LAB | Facility: CLINIC | Age: 48
End: 2023-12-07
Payer: COMMERCIAL

## 2023-12-07 DIAGNOSIS — M51.16 LUMBAR DISC HERNIATION WITH RADICULOPATHY: ICD-10-CM

## 2023-12-07 DIAGNOSIS — Z01.818 PRE-OP EVALUATION: ICD-10-CM

## 2023-12-07 LAB
CREAT SERPL-MCNC: 1.25 MG/DL (ref 0.67–1.17)
EGFRCR SERPLBLD CKD-EPI 2021: 71 ML/MIN/1.73M2

## 2023-12-07 PROCEDURE — 71046 X-RAY EXAM CHEST 2 VIEWS: CPT | Mod: GC | Performed by: RADIOLOGY

## 2023-12-07 PROCEDURE — 36415 COLL VENOUS BLD VENIPUNCTURE: CPT | Performed by: PATHOLOGY

## 2023-12-07 PROCEDURE — 82565 ASSAY OF CREATININE: CPT | Performed by: PATHOLOGY

## 2023-12-08 ENCOUNTER — OFFICE VISIT (OUTPATIENT)
Dept: FAMILY MEDICINE | Facility: CLINIC | Age: 48
End: 2023-12-08
Payer: COMMERCIAL

## 2023-12-08 VITALS
SYSTOLIC BLOOD PRESSURE: 142 MMHG | OXYGEN SATURATION: 96 % | HEART RATE: 89 BPM | TEMPERATURE: 97.8 F | WEIGHT: 200 LBS | HEIGHT: 68 IN | DIASTOLIC BLOOD PRESSURE: 92 MMHG | BODY MASS INDEX: 30.31 KG/M2

## 2023-12-08 DIAGNOSIS — I10 BENIGN ESSENTIAL HYPERTENSION: ICD-10-CM

## 2023-12-08 DIAGNOSIS — R79.89 ELEVATED SERUM CREATININE: Primary | ICD-10-CM

## 2023-12-08 LAB
ALBUMIN UR-MCNC: 10 MG/DL
APPEARANCE UR: CLEAR
BILIRUB UR QL STRIP: NEGATIVE
COLOR UR AUTO: ABNORMAL
CREAT UR-MCNC: 269 MG/DL
GLUCOSE UR STRIP-MCNC: NEGATIVE MG/DL
HGB UR QL STRIP: NEGATIVE
KETONES UR STRIP-MCNC: NEGATIVE MG/DL
LEUKOCYTE ESTERASE UR QL STRIP: NEGATIVE
MICROALBUMIN UR-MCNC: <12 MG/L
MICROALBUMIN/CREAT UR: NORMAL MG/G{CREAT}
MUCOUS THREADS #/AREA URNS LPF: PRESENT /LPF
NITRATE UR QL: NEGATIVE
PH UR STRIP: 6.5 [PH] (ref 5–7)
RBC URINE: 0 /HPF
SP GR UR STRIP: 1.02 (ref 1–1.03)
UROBILINOGEN UR STRIP-MCNC: NORMAL MG/DL
WBC URINE: <1 /HPF

## 2023-12-08 PROCEDURE — 81001 URINALYSIS AUTO W/SCOPE: CPT | Mod: ORL | Performed by: NURSE PRACTITIONER

## 2023-12-08 PROCEDURE — 82570 ASSAY OF URINE CREATININE: CPT | Mod: ORL | Performed by: NURSE PRACTITIONER

## 2023-12-08 RX ORDER — VALSARTAN 40 MG/1
40 TABLET ORAL 2 TIMES DAILY
Qty: 60 TABLET | Refills: 1 | Status: SHIPPED | OUTPATIENT
Start: 2023-12-08 | End: 2024-02-08

## 2023-12-08 ASSESSMENT — ENCOUNTER SYMPTOMS
FATIGUE: 1
SHORTNESS OF BREATH: 0

## 2023-12-08 NOTE — NURSING NOTE
"ROOM:2  TORRI GUILLEN    Preferred Name: Antonio     How did you hear about us?  Other - Call Center    48 year old  Chief Complaint   Patient presents with     labs      Has surgery Tuesday, elevated lab        Blood pressure (!) 142/92, pulse 89, temperature 97.8  F (36.6  C), temperature source Oral, height 1.73 m (5' 8.1\"), weight 90.7 kg (200 lb), SpO2 96%. Body mass index is 30.32 kg/m .  BP completed using cuff size:        Patient Active Problem List   Diagnosis     CARDIOVASCULAR SCREENING; LDL GOAL LESS THAN 160     Cellulitis     Tobacco abuse     Nummular eczema     Nummular dermatitis     Lumbar radiculopathy     Acute left-sided low back pain with left-sided sciatica     Lumbar disc herniation with radiculopathy     Dermatitis       Wt Readings from Last 2 Encounters:   23 90.7 kg (200 lb)   23 90.7 kg (200 lb)     BP Readings from Last 3 Encounters:   23 (!) 142/92   23 (!) 127/98   10/30/23 (!) 152/105       Allergies   Allergen Reactions     No Known Drug Allergy        Current Outpatient Medications   Medication     amphetamine-dextroamphetamine (ADDERALL) 30 MG tablet     clobetasol (TEMOVATE) 0.05 % external ointment     cyclobenzaprine (FLEXERIL) 5 MG tablet     dupilumab (DUPIXENT) 300 MG/2ML prefilled syringe     gabapentin (NEURONTIN) 600 MG tablet     triamcinolone (KENALOG) 0.1 % external ointment     venlafaxine (EFFEXOR XR) 37.5 MG 24 hr capsule     gabapentin (NEURONTIN) 300 MG capsule     gabapentin (NEURONTIN) 300 MG capsule     No current facility-administered medications for this visit.       Social History     Tobacco Use     Smoking status: Former     Packs/day: 1.00     Years: 13.00     Additional pack years: 0.00     Total pack years: 13.00     Types: Cigarettes     Quit date: 2011     Years since quittin.9     Smokeless tobacco: Former   Vaping Use     Vaping Use: Never used   Substance Use Topics     Alcohol use: Not Currently     Comment: " "occ     Drug use: No       Honoring Choices - Health Care Directive Guide offered to patient at time of visit.    Health Maintenance Due   Topic Date Due     YEARLY PREVENTIVE VISIT  Never done     ADVANCE CARE PLANNING  Never done     HEPATITIS B IMMUNIZATION (1 of 3 - 3-dose series) Never done     COLORECTAL CANCER SCREENING  Never done     HIV SCREENING  Never done     HEPATITIS C SCREENING  Never done     LIPID  Never done     INFLUENZA VACCINE (1) 09/01/2023     COVID-19 Vaccine (3 - 2023-24 season) 09/01/2023       Immunization History   Administered Date(s) Administered     COVID-19 MONOVALENT 12+ (Pfizer) 11/24/2021     COVID-19 Vaccine (Corinne) 04/06/2021     Influenza Vaccine >6 months,quad, PF 10/13/2014     TDAP Vaccine (Adacel) 10/13/2014       No results found for: \"PAP\"    Recent Labs   Lab Test 12/07/23  1625 11/30/23  0927   A1C  --  5.5   CR 1.25* 1.21*   GFRESTIMATED 71 74   ALBUMIN  --  4.3   POTASSIUM  --  3.6           12/4/2023     5:12 PM 10/30/2023     3:04 PM   PHQ-2 ( 1999 Pfizer)   Q1: Little interest or pleasure in doing things 0 0   Q2: Feeling down, depressed or hopeless 0 0   PHQ-2 Score 0 0            No data to display                     No data to display                     No data to display                Anurag Herbert    December 8, 2023 3:37 PM    "

## 2023-12-08 NOTE — Clinical Note
Meño Almaguer,  I met Antonio today and started him on valsartan. Please reach out with any questions.  Thanks! Doug

## 2023-12-08 NOTE — PROGRESS NOTES
Today's Date: Dec 8, 2023     Patient Antonio Burger 1975 presents to the clinic today to address   Chief Complaint   Patient presents with    labs      Has surgery Tuesday, elevated lab              SUBJECTIVE     History of Present Illness:      48-year-old male past medical history narcolepsy, eczema, lumbar disc herniation with radiculopathy,and  anxiety/depression presents to clinic discuss elevated creatinine.  Patient is scheduled for a Left Lumbar 4-5, Lumbar 5-Sacral 1 Hemilaminectomy and Microdiskectomy, with Microscope on 12/12/2023.  Preop workup demonstrated mildly elevated creatinine (most recent values 1.25 yesterday, and 1.21 on 11/30/2023) and patient was referred to primary care for evaluation.      Today, patient denies formal diagnosis of hypertension but does admit that his blood pressures run in the 130s to 140s systolic at home.  He denies history of diabetes and had a recent A1c of 5.5%.  He denies history of peripheral arterial disease.  He endorses recent weight gain considering he has been unable to exercise because of his chronic back pain.  He denies history of liver or heart failure.  He denies frequent UTIs and denies a family history of chronic kidney disease.  He endorses possibility of dehydration, he does not drink coffee but does drink Coke zero intermittently.  He denies history of HIV, hep C, or hep B infections.  He endorses daily ibuprofen use (he only recently discontinued this prior to his surgery).    Symptomatically, patient feels he is at his baseline.  He endorses chronic fatigue which he believes is secondary to his narcolepsy.  He takes Adderall 30 mg 4 times daily for this.  Otherwise, patient denies shortness of breath, chest pain, or peripheral edema. No other acute concerns/symptoms at time of exam.              Review of Systems   Constitutional: Positive for fatigue (2/2 narcolepsy).   Respiratory: Negative for shortness of breath.    Cardiovascular:  Negative for chest pain and peripheral edema.   Review of Systems   Constitutional, HEENT, cardiovascular, pulmonary, gi and gu systems are negative, except as otherwise noted.        Allergies   Allergen Reactions    No Known Drug Allergy         Current Outpatient Medications   Medication Instructions    amphetamine-dextroamphetamine (ADDERALL) 30 MG tablet 30 mg, Oral, 4 TIMES DAILY    clobetasol (TEMOVATE) 0.05 % external ointment Apply twice daily as needed for itchy spots on legs.    cyclobenzaprine (FLEXERIL) 5 mg, Oral, 3 TIMES DAILY PRN    Dupixent 300 mg, Subcutaneous, EVERY 14 DAYS    gabapentin (NEURONTIN) 300 MG capsule Take 2 capsules (600 mg) by mouth every morning AND 3 capsules (900 mg) at bedtime.    gabapentin (NEURONTIN) 300 mg, Oral, 3 TIMES DAILY    gabapentin (NEURONTIN) 600 mg, Oral, 3 TIMES DAILY, According to the chart provided    triamcinolone (KENALOG) 0.1 % external ointment Apply twice daily to any itchy areas on hands, arms, or legs.    venlafaxine (EFFEXOR XR) 37.5 mg, Oral, EVERY MORNING       Past Medical History:   Diagnosis Date    Anxiety     Depressive disorder     Eczema     Lipoma of other specified sites 01/01/2007     neck area- plans observation    Narcolepsy without cataplexy in conditions classified elsewhere     Tobacco use disorder quit on 12/26/11    Smokes 1 ppd. Started at 16 years        Family History   Problem Relation Age of Onset    Diabetes Mother         type 2    Hypertension Mother     Depression Mother     Heart Failure Mother     Gastrointestinal Disease Father         reflux    Cancer Maternal Grandmother         stomach    Cancer Paternal Grandmother         ?    Anesthesia Reaction No family hx of     Deep Vein Thrombosis (DVT) No family hx of         Social History     Tobacco Use    Smoking status: Former     Packs/day: 1.00     Years: 13.00     Additional pack years: 0.00     Total pack years: 13.00     Types: Cigarettes     Quit date: 12/27/2011      "Years since quittin.9    Smokeless tobacco: Former   Vaping Use    Vaping Use: Never used   Substance Use Topics    Alcohol use: Not Currently     Comment: occ    Drug use: No        History   Sexual Activity    Sexual activity: Yes    Partners: Female    Birth control/ protection: Pill             No data to display                 Immunization History   Administered Date(s) Administered    COVID-19 MONOVALENT 12+ (Pfizer) 2021    COVID-19 Vaccine (Corinne) 2021    Influenza Vaccine >6 months,quad, PF 10/13/2014    TDAP Vaccine (Adacel) 10/13/2014                 OBJECTIVE     BP (!) 142/92   Pulse 89   Temp 97.8  F (36.6  C) (Oral)   Ht 1.73 m (5' 8.1\")   Wt 90.7 kg (200 lb)   SpO2 96%   BMI 30.32 kg/m       Labs:  Lab Results   Component Value Date    WBC 5.1 2023    HGB 14.8 2023    HCT 43.4 2023     2023     2023    BUN 15.3 2023    CO2 25 2023    INR 1.03 2023        Physical Exam  Constitutional:       General: He is not in acute distress.     Appearance: He is not ill-appearing.   Cardiovascular:      Rate and Rhythm: Normal rate and regular rhythm.      Heart sounds: Normal heart sounds. No murmur heard.  Pulmonary:      Effort: Pulmonary effort is normal. No respiratory distress.      Breath sounds: Normal breath sounds. No wheezing or rales.   Abdominal:      General: Bowel sounds are normal. There is no abdominal bruit.      Palpations: Abdomen is soft.      Tenderness: There is no abdominal tenderness.   Musculoskeletal:      Cervical back: Neck supple.      Right lower leg: No edema.      Left lower leg: No edema.   Lymphadenopathy:      Cervical: No cervical adenopathy.   Neurological:      General: No focal deficit present.      Mental Status: He is alert.   Psychiatric:         Thought Content: Thought content normal.         Judgment: Judgment normal.               ASSESSMENT/PLAN     1. Elevated serum " creatinine  This is a very pleasant 48-year-old male with past medical history significant for narcolepsy and lumbar disc herniation who presents to discuss elevated creatinine. Elevated creatinine most likely secondary to uncontrolled BP and chronic nsaid therapy which he has recently discontinued.  The patient's blood pressure is slightly elevated, otherwise his physical exam is unremarkable.  His elevated BP is also likely instigated by his chronic stimulant therapy for narcolepsy.  Considering his upcoming surgery and the unlikelihood that he will be able to partake in cardio exercise in the near future, will start low-dose valsartan 40 mg twice daily for blood pressure control.  Will also check his UA and urine albumin.  Patient instructed to hold losartan for 12 hours prior to his procedure next week. Pt also instructed to continue monitoring blood pressure outpatient and follow-up in 1 month for repeat blood pressure evaluation and labs.    - Albumin Random Urine Quantitative with Creat Ratio  - UA with Microscopic    2. Benign essential hypertension  Start valsartan as noted above.  - valsartan (DIOVAN) 40 MG tablet; Take 1 tablet (40 mg) by mouth 2 times daily  Dispense: 60 tablet; Refill: 1  - PRIMARY CARE FOLLOW-UP SCHEDULING; Future  - Albumin Random Urine Quantitative with Creat Ratio  - UA with Microscopic          Follow-Up:  - Follow up in 1 month(s) for BP eval and repeat labs, or sooner if symptoms worsen or fail to improve.     Options for treatment and follow-up care were reviewed with the patient. Patient engaged in the decision making process and verbalized understanding of the options discussed and agreed with the final plan.  AVS printed and given to patient.    ELICIA Scott CNP    [facility]  [address] [place] [address] [id number] [phone]        Note: Chart documentation was done in part with Dragon Voice Recognition  software.  Although reviewed after completion, some word and grammatical errors may remain. Please contact author for any clarification or concerns.

## 2023-12-11 NOTE — ANESTHESIA PREPROCEDURE EVALUATION
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 Started at 16 years      Past Surgical History:   Procedure Laterality Date     INJECT EPIDURAL TRANSFORAMINAL Left 10/4/2023    Procedure: Left L5-S1 and S1 transforaminal epidural steroid injection;  Surgeon: Morena Olivarez MD;  Location: [facility] OR     NO HISTORY OF SURGERY        Allergies   Allergen Reactions     No Known Drug Allergy       Social History     Tobacco Use     Smoking status: Former     Packs/day: 1.00     Years: 13.00     Additional pack years: 0.00     Total pack years: 13.00     Types: Cigarettes     Quit date: 2011     Years since quittin.9     Smokeless tobacco: Former   Substance Use Topics     Alcohol use: Not Currently     Comment: occ      Wt Readings from Last 1 Encounters:   23 90.7 kg (200 lb)        Anesthesia Evaluation   Pt has not had prior anesthetic         ROS/MED HX  ENT/Pulmonary: Comment: Quit smoking in  - neg pulmonary ROS   (+)                tobacco use, Past use, 1 packs/day, 13  Pack-Year Hx,                     Neurologic: Comment: Left radiculopathy into left buttock and leg. Narcolepsy. Take adderral      Cardiovascular:  - neg cardiovascular ROS     METS/Exercise Tolerance:     Hematologic:  - neg hematologic  ROS     Musculoskeletal: Comment: Lumbar disc disease L4-S1 with radiculopathy on the left      GI/Hepatic:  - neg GI/hepatic ROS     Renal/Genitourinary: Comment: Increased creatinine in . This morning 23 is 1.31    "   Endo:  - neg endo ROS     Psychiatric/Substance Use:     (+) psychiatric history anxiety and depression       Infectious Disease:  - neg infectious disease ROS     Malignancy:  - neg malignancy ROS     Other:            Physical Exam    Airway        Mallampati: I   TM distance: > 3 FB   Neck ROM: full   Mouth opening: > 3 cm    Respiratory Devices and Support         Dental       (+) Modest Abnormalities - crowns, retainers, 1 or 2 missing teeth    B=Bridge, C=Chipped, L=Loose, M=Missing    Cardiovascular   cardiovascular exam normal       Rhythm and rate: regular     Pulmonary   pulmonary exam normal        breath sounds clear to auscultation       OUTSIDE LABS:  CBC:   Lab Results   Component Value Date    WBC 5.1 11/30/2023    WBC 7.4 10/14/2012    HGB 14.8 11/30/2023    HGB 15.2 10/14/2012    HCT 43.4 11/30/2023    HCT 43.4 10/14/2012     11/30/2023     10/14/2012     BMP:   Lab Results   Component Value Date     11/30/2023     10/14/2012    POTASSIUM 3.6 11/30/2023    POTASSIUM 3.7 10/14/2012    CHLORIDE 105 11/30/2023    CHLORIDE 104 10/14/2012    CO2 25 11/30/2023    CO2 26 10/14/2012    BUN 15.3 11/30/2023    BUN 13 10/14/2012    CR 1.25 (H) 12/07/2023    CR 1.21 (H) 11/30/2023     (H) 11/30/2023    GLC 85 10/14/2012     COAGS:   Lab Results   Component Value Date    PTT 27 11/30/2023    INR 1.03 11/30/2023     POC: No results found for: \"BGM\", \"HCG\", \"HCGS\"  HEPATIC:   Lab Results   Component Value Date    ALBUMIN 4.3 11/30/2023     OTHER:   Lab Results   Component Value Date    A1C 5.5 11/30/2023    SAGE 9.6 11/30/2023    SED 6 10/14/2012       Anesthesia Plan    ASA Status:  2    NPO Status:  NPO Appropriate    Anesthesia Type: General.     - Airway: ETT   Induction: Intravenous.   Maintenance: Inhalation.   Techniques and Equipment:     - Lines/Monitors: 2nd IV     Consents    Anesthesia Plan(s) and associated risks, benefits, and realistic alternatives discussed. " "Questions answered and patient/representative(s) expressed understanding.     - Discussed: Risks, Benefits and Alternatives for BOTH SEDATION and the PROCEDURE were discussed     - Discussed with:  Patient      - Extended Intubation/Ventilatory Support Discussed: No.      - Patient is DNR/DNI Status: No     Use of blood products discussed: No .     Postoperative Care    Pain management: IV analgesics, Oral pain medications.   PONV prophylaxis: Ondansetron (or other 5HT-3), Dexamethasone or Solumedrol     Comments:    Other Comments: Given the patients mild increase in creatinine in the last few months, the plan to minimize kidney injury is to keep MAPs above 65 and ensure urine output is 0.5cc/kg/hr.        H&P reviewed: Unable to attach H&P to encounter due to EHR limitations. H&P Update: appropriate H&P reviewed, patient examined. No interval changes since H&P (within 30 days).      Damaris Sheridan MD    I have reviewed the pertinent notes and labs in the chart from the past 30 days and (re)examined the patient.  Any updates or changes from those notes are reflected in this note.              # Obesity: Estimated body mass index is 30.32 kg/m  as calculated from the following:    Height as of 12/8/23: 1.73 m (5' 8.1\").    Weight as of 12/8/23: 90.7 kg (200 lb).      "

## 2023-12-12 ENCOUNTER — MYC MEDICAL ADVICE (OUTPATIENT)
Dept: NEUROSURGERY | Facility: CLINIC | Age: 48
End: 2023-12-12

## 2023-12-12 ENCOUNTER — ANESTHESIA (OUTPATIENT)
Dept: SURGERY | Facility: CLINIC | Age: 48
End: 2023-12-12
Payer: COMMERCIAL

## 2023-12-12 ENCOUNTER — APPOINTMENT (OUTPATIENT)
Dept: GENERAL RADIOLOGY | Facility: CLINIC | Age: 48
End: 2023-12-12
Attending: NEUROLOGICAL SURGERY
Payer: COMMERCIAL

## 2023-12-12 ENCOUNTER — HOSPITAL ENCOUNTER (OUTPATIENT)
Facility: CLINIC | Age: 48
Discharge: HOME | End: 2023-12-13
Attending: NEUROLOGICAL SURGERY | Admitting: ORTHOPAEDIC SURGERY
Payer: COMMERCIAL

## 2023-12-12 DIAGNOSIS — M54.16 LUMBAR RADICULOPATHY: ICD-10-CM

## 2023-12-12 DIAGNOSIS — M51.16 LUMBAR DISC HERNIATION WITH RADICULOPATHY: Primary | ICD-10-CM

## 2023-12-12 DIAGNOSIS — Z98.890 S/P LUMBAR MICRODISCECTOMY: ICD-10-CM

## 2023-12-12 DIAGNOSIS — M54.42 ACUTE LEFT-SIDED LOW BACK PAIN WITH LEFT-SIDED SCIATICA: ICD-10-CM

## 2023-12-12 PROBLEM — I10 PRIMARY HYPERTENSION: Status: ACTIVE | Noted: 2023-12-12

## 2023-12-12 PROBLEM — R79.89 ELEVATED SERUM CREATININE: Status: ACTIVE | Noted: 2023-12-12

## 2023-12-12 LAB
ABO/RH(D): NORMAL
ANTIBODY SCREEN: NEGATIVE
CREAT SERPL-MCNC: 1.32 MG/DL (ref 0.67–1.17)
EGFRCR SERPLBLD CKD-EPI 2021: 67 ML/MIN/1.73M2
GLUCOSE BLDC GLUCOMTR-MCNC: 98 MG/DL (ref 70–99)
SPECIMEN EXPIRATION DATE: NORMAL

## 2023-12-12 PROCEDURE — 250N000011 HC RX IP 250 OP 636: Performed by: ORTHOPAEDIC SURGERY

## 2023-12-12 PROCEDURE — 710N000010 HC RECOVERY PHASE 1, LEVEL 2, PER MIN: Performed by: NEUROLOGICAL SURGERY

## 2023-12-12 PROCEDURE — 999N000180 XR SURGERY CARM FLUORO LESS THAN 5 MIN: Mod: TC

## 2023-12-12 PROCEDURE — 36415 COLL VENOUS BLD VENIPUNCTURE: CPT | Performed by: NEUROLOGICAL SURGERY

## 2023-12-12 PROCEDURE — 63035 LAMOT DCMPRN NRV RT EA ADDL: CPT | Mod: LT | Performed by: NEUROLOGICAL SURGERY

## 2023-12-12 PROCEDURE — 999N000141 HC STATISTIC PRE-PROCEDURE NURSING ASSESSMENT: Performed by: NEUROLOGICAL SURGERY

## 2023-12-12 PROCEDURE — 250N000013 HC RX MED GY IP 250 OP 250 PS 637: Performed by: NEUROLOGICAL SURGERY

## 2023-12-12 PROCEDURE — 86850 RBC ANTIBODY SCREEN: CPT | Performed by: NEUROLOGICAL SURGERY

## 2023-12-12 PROCEDURE — 258N000003 HC RX IP 258 OP 636: Performed by: NURSE ANESTHETIST, CERTIFIED REGISTERED

## 2023-12-12 PROCEDURE — 250N000013 HC RX MED GY IP 250 OP 250 PS 637: Performed by: NURSE ANESTHETIST, CERTIFIED REGISTERED

## 2023-12-12 PROCEDURE — 250N000013 HC RX MED GY IP 250 OP 250 PS 637: Performed by: ORTHOPAEDIC SURGERY

## 2023-12-12 PROCEDURE — 250N000009 HC RX 250: Performed by: NEUROLOGICAL SURGERY

## 2023-12-12 PROCEDURE — 63030 LAMOT DCMPRN NRV RT 1 LMBR: CPT | Mod: LT | Performed by: NEUROLOGICAL SURGERY

## 2023-12-12 PROCEDURE — 360N000086 HC SURGERY LEVEL 6 W/ FLUORO, PER MIN: Performed by: NEUROLOGICAL SURGERY

## 2023-12-12 PROCEDURE — 250N000011 HC RX IP 250 OP 636: Performed by: NEUROLOGICAL SURGERY

## 2023-12-12 PROCEDURE — 250N000009 HC RX 250

## 2023-12-12 PROCEDURE — 272N000001 HC OR GENERAL SUPPLY STERILE: Performed by: NEUROLOGICAL SURGERY

## 2023-12-12 PROCEDURE — 250N000011 HC RX IP 250 OP 636: Performed by: ANESTHESIOLOGY

## 2023-12-12 PROCEDURE — 250N000025 HC SEVOFLURANE, PER MIN: Performed by: NEUROLOGICAL SURGERY

## 2023-12-12 PROCEDURE — 82565 ASSAY OF CREATININE: CPT | Performed by: PHYSICIAN ASSISTANT

## 2023-12-12 PROCEDURE — 86901 BLOOD TYPING SEROLOGIC RH(D): CPT | Performed by: NEUROLOGICAL SURGERY

## 2023-12-12 PROCEDURE — 370N000017 HC ANESTHESIA TECHNICAL FEE, PER MIN: Performed by: NEUROLOGICAL SURGERY

## 2023-12-12 PROCEDURE — 250N000009 HC RX 250: Performed by: NURSE ANESTHETIST, CERTIFIED REGISTERED

## 2023-12-12 PROCEDURE — 250N000011 HC RX IP 250 OP 636: Mod: JZ | Performed by: NURSE ANESTHETIST, CERTIFIED REGISTERED

## 2023-12-12 PROCEDURE — 82962 GLUCOSE BLOOD TEST: CPT

## 2023-12-12 RX ORDER — HYDROMORPHONE HYDROCHLORIDE 1 MG/ML
0.2 INJECTION, SOLUTION INTRAMUSCULAR; INTRAVENOUS; SUBCUTANEOUS
Status: DISCONTINUED | OUTPATIENT
Start: 2023-12-12 | End: 2023-12-13 | Stop reason: HOSPADM

## 2023-12-12 RX ORDER — NALOXONE HYDROCHLORIDE 0.4 MG/ML
0.2 INJECTION, SOLUTION INTRAMUSCULAR; INTRAVENOUS; SUBCUTANEOUS
Status: DISCONTINUED | OUTPATIENT
Start: 2023-12-12 | End: 2023-12-13 | Stop reason: HOSPADM

## 2023-12-12 RX ORDER — FENTANYL CITRATE 50 UG/ML
INJECTION, SOLUTION INTRAMUSCULAR; INTRAVENOUS PRN
Status: DISCONTINUED | OUTPATIENT
Start: 2023-12-12 | End: 2023-12-12

## 2023-12-12 RX ORDER — SODIUM CHLORIDE, SODIUM LACTATE, POTASSIUM CHLORIDE, CALCIUM CHLORIDE 600; 310; 30; 20 MG/100ML; MG/100ML; MG/100ML; MG/100ML
INJECTION, SOLUTION INTRAVENOUS CONTINUOUS
Status: DISCONTINUED | OUTPATIENT
Start: 2023-12-12 | End: 2023-12-12 | Stop reason: HOSPADM

## 2023-12-12 RX ORDER — GABAPENTIN 300 MG/1
300 CAPSULE ORAL
Status: COMPLETED | OUTPATIENT
Start: 2023-12-12 | End: 2023-12-12

## 2023-12-12 RX ORDER — ONDANSETRON 2 MG/ML
4 INJECTION INTRAMUSCULAR; INTRAVENOUS EVERY 6 HOURS PRN
Status: DISCONTINUED | OUTPATIENT
Start: 2023-12-12 | End: 2023-12-13 | Stop reason: HOSPADM

## 2023-12-12 RX ORDER — FENTANYL CITRATE 50 UG/ML
50 INJECTION, SOLUTION INTRAMUSCULAR; INTRAVENOUS EVERY 5 MIN PRN
Status: DISCONTINUED | OUTPATIENT
Start: 2023-12-12 | End: 2023-12-12 | Stop reason: HOSPADM

## 2023-12-12 RX ORDER — OXYCODONE HYDROCHLORIDE 5 MG/1
10 TABLET ORAL EVERY 4 HOURS PRN
Status: DISCONTINUED | OUTPATIENT
Start: 2023-12-12 | End: 2023-12-13 | Stop reason: HOSPADM

## 2023-12-12 RX ORDER — HYDROMORPHONE HYDROCHLORIDE 1 MG/ML
0.2 INJECTION, SOLUTION INTRAMUSCULAR; INTRAVENOUS; SUBCUTANEOUS EVERY 5 MIN PRN
Status: DISCONTINUED | OUTPATIENT
Start: 2023-12-12 | End: 2023-12-12 | Stop reason: HOSPADM

## 2023-12-12 RX ORDER — OXYCODONE HYDROCHLORIDE 5 MG/1
5-10 TABLET ORAL EVERY 4 HOURS PRN
Qty: 20 TABLET | Refills: 0 | Status: SHIPPED | OUTPATIENT
Start: 2023-12-12 | End: 2024-02-06

## 2023-12-12 RX ORDER — ACETAMINOPHEN 325 MG/1
975 TABLET ORAL EVERY 8 HOURS
Status: DISCONTINUED | OUTPATIENT
Start: 2023-12-12 | End: 2023-12-13 | Stop reason: HOSPADM

## 2023-12-12 RX ORDER — ONDANSETRON 4 MG/1
4 TABLET, ORALLY DISINTEGRATING ORAL EVERY 30 MIN PRN
Status: DISCONTINUED | OUTPATIENT
Start: 2023-12-12 | End: 2023-12-12 | Stop reason: HOSPADM

## 2023-12-12 RX ORDER — ACETAMINOPHEN 325 MG/1
650 TABLET ORAL EVERY 4 HOURS PRN
Status: DISCONTINUED | OUTPATIENT
Start: 2023-12-15 | End: 2023-12-13 | Stop reason: HOSPADM

## 2023-12-12 RX ORDER — AMOXICILLIN 250 MG
1 CAPSULE ORAL DAILY
Qty: 30 TABLET | Refills: 0 | Status: SHIPPED | OUTPATIENT
Start: 2023-12-12 | End: 2024-02-08

## 2023-12-12 RX ORDER — NALOXONE HYDROCHLORIDE 0.4 MG/ML
0.4 INJECTION, SOLUTION INTRAMUSCULAR; INTRAVENOUS; SUBCUTANEOUS
Status: DISCONTINUED | OUTPATIENT
Start: 2023-12-12 | End: 2023-12-13 | Stop reason: HOSPADM

## 2023-12-12 RX ORDER — MEPERIDINE HYDROCHLORIDE 25 MG/ML
12.5 INJECTION INTRAMUSCULAR; INTRAVENOUS; SUBCUTANEOUS EVERY 5 MIN PRN
Status: DISCONTINUED | OUTPATIENT
Start: 2023-12-12 | End: 2023-12-12 | Stop reason: HOSPADM

## 2023-12-12 RX ORDER — CEFAZOLIN SODIUM/WATER 2 G/20 ML
2 SYRINGE (ML) INTRAVENOUS
Status: COMPLETED | OUTPATIENT
Start: 2023-12-12 | End: 2023-12-12

## 2023-12-12 RX ORDER — FENTANYL CITRATE 50 UG/ML
25 INJECTION, SOLUTION INTRAMUSCULAR; INTRAVENOUS EVERY 5 MIN PRN
Status: DISCONTINUED | OUTPATIENT
Start: 2023-12-12 | End: 2023-12-12 | Stop reason: HOSPADM

## 2023-12-12 RX ORDER — HYDROMORPHONE HYDROCHLORIDE 1 MG/ML
0.4 INJECTION, SOLUTION INTRAMUSCULAR; INTRAVENOUS; SUBCUTANEOUS
Status: DISCONTINUED | OUTPATIENT
Start: 2023-12-12 | End: 2023-12-13 | Stop reason: HOSPADM

## 2023-12-12 RX ORDER — ONDANSETRON 2 MG/ML
INJECTION INTRAMUSCULAR; INTRAVENOUS PRN
Status: DISCONTINUED | OUTPATIENT
Start: 2023-12-12 | End: 2023-12-12

## 2023-12-12 RX ORDER — POLYETHYLENE GLYCOL 3350 17 G/17G
17 POWDER, FOR SOLUTION ORAL DAILY
Status: DISCONTINUED | OUTPATIENT
Start: 2023-12-13 | End: 2023-12-13 | Stop reason: HOSPADM

## 2023-12-12 RX ORDER — LIDOCAINE 40 MG/G
CREAM TOPICAL
Status: DISCONTINUED | OUTPATIENT
Start: 2023-12-12 | End: 2023-12-13 | Stop reason: HOSPADM

## 2023-12-12 RX ORDER — DEXAMETHASONE SODIUM PHOSPHATE 4 MG/ML
INJECTION, SOLUTION INTRA-ARTICULAR; INTRALESIONAL; INTRAMUSCULAR; INTRAVENOUS; SOFT TISSUE PRN
Status: DISCONTINUED | OUTPATIENT
Start: 2023-12-12 | End: 2023-12-12

## 2023-12-12 RX ORDER — ONDANSETRON 2 MG/ML
4 INJECTION INTRAMUSCULAR; INTRAVENOUS EVERY 30 MIN PRN
Status: DISCONTINUED | OUTPATIENT
Start: 2023-12-12 | End: 2023-12-12 | Stop reason: HOSPADM

## 2023-12-12 RX ORDER — METHOCARBAMOL 750 MG/1
750 TABLET, FILM COATED ORAL EVERY 6 HOURS PRN
Qty: 60 TABLET | Refills: 0 | Status: SHIPPED | OUTPATIENT
Start: 2023-12-12 | End: 2024-02-08

## 2023-12-12 RX ORDER — OXYCODONE HYDROCHLORIDE 5 MG/1
5 TABLET ORAL EVERY 4 HOURS PRN
Status: DISCONTINUED | OUTPATIENT
Start: 2023-12-12 | End: 2023-12-13 | Stop reason: HOSPADM

## 2023-12-12 RX ORDER — ONDANSETRON 4 MG/1
4 TABLET, ORALLY DISINTEGRATING ORAL EVERY 6 HOURS PRN
Status: DISCONTINUED | OUTPATIENT
Start: 2023-12-12 | End: 2023-12-13 | Stop reason: HOSPADM

## 2023-12-12 RX ORDER — CEFAZOLIN SODIUM/WATER 2 G/20 ML
2 SYRINGE (ML) INTRAVENOUS SEE ADMIN INSTRUCTIONS
Status: DISCONTINUED | OUTPATIENT
Start: 2023-12-12 | End: 2023-12-12 | Stop reason: HOSPADM

## 2023-12-12 RX ORDER — ACETAMINOPHEN 325 MG/1
650 TABLET ORAL EVERY 4 HOURS PRN
Qty: 50 TABLET | Refills: 0 | Status: SHIPPED | OUTPATIENT
Start: 2023-12-12 | End: 2024-02-08

## 2023-12-12 RX ORDER — PROPOFOL 10 MG/ML
INJECTION, EMULSION INTRAVENOUS PRN
Status: DISCONTINUED | OUTPATIENT
Start: 2023-12-12 | End: 2023-12-12

## 2023-12-12 RX ORDER — ALBUTEROL SULFATE 90 UG/1
AEROSOL, METERED RESPIRATORY (INHALATION) PRN
Status: DISCONTINUED | OUTPATIENT
Start: 2023-12-12 | End: 2023-12-12

## 2023-12-12 RX ORDER — LIDOCAINE HYDROCHLORIDE 20 MG/ML
INJECTION, SOLUTION INFILTRATION; PERINEURAL PRN
Status: DISCONTINUED | OUTPATIENT
Start: 2023-12-12 | End: 2023-12-12

## 2023-12-12 RX ORDER — AMOXICILLIN 250 MG
1 CAPSULE ORAL 2 TIMES DAILY
Status: DISCONTINUED | OUTPATIENT
Start: 2023-12-12 | End: 2023-12-13 | Stop reason: HOSPADM

## 2023-12-12 RX ORDER — LABETALOL HYDROCHLORIDE 5 MG/ML
10 INJECTION, SOLUTION INTRAVENOUS
Status: DISCONTINUED | OUTPATIENT
Start: 2023-12-12 | End: 2023-12-12 | Stop reason: HOSPADM

## 2023-12-12 RX ORDER — BUPIVACAINE HYDROCHLORIDE AND EPINEPHRINE 5; 5 MG/ML; UG/ML
INJECTION, SOLUTION PERINEURAL PRN
Status: DISCONTINUED | OUTPATIENT
Start: 2023-12-12 | End: 2023-12-12 | Stop reason: HOSPADM

## 2023-12-12 RX ORDER — BISACODYL 10 MG
10 SUPPOSITORY, RECTAL RECTAL DAILY PRN
Status: DISCONTINUED | OUTPATIENT
Start: 2023-12-12 | End: 2023-12-13 | Stop reason: HOSPADM

## 2023-12-12 RX ORDER — HYDROMORPHONE HYDROCHLORIDE 1 MG/ML
0.4 INJECTION, SOLUTION INTRAMUSCULAR; INTRAVENOUS; SUBCUTANEOUS EVERY 5 MIN PRN
Status: DISCONTINUED | OUTPATIENT
Start: 2023-12-12 | End: 2023-12-12 | Stop reason: HOSPADM

## 2023-12-12 RX ORDER — SODIUM CHLORIDE, SODIUM LACTATE, POTASSIUM CHLORIDE, CALCIUM CHLORIDE 600; 310; 30; 20 MG/100ML; MG/100ML; MG/100ML; MG/100ML
INJECTION, SOLUTION INTRAVENOUS CONTINUOUS PRN
Status: DISCONTINUED | OUTPATIENT
Start: 2023-12-12 | End: 2023-12-12

## 2023-12-12 RX ADMIN — DEXAMETHASONE SODIUM PHOSPHATE 10 MG: 4 INJECTION, SOLUTION INTRA-ARTICULAR; INTRALESIONAL; INTRAMUSCULAR; INTRAVENOUS; SOFT TISSUE at 08:36

## 2023-12-12 RX ADMIN — Medication 10 MG: at 09:10

## 2023-12-12 RX ADMIN — FENTANYL CITRATE 50 MCG: 50 INJECTION INTRAMUSCULAR; INTRAVENOUS at 11:11

## 2023-12-12 RX ADMIN — FENTANYL CITRATE 50 MCG: 50 INJECTION INTRAMUSCULAR; INTRAVENOUS at 11:42

## 2023-12-12 RX ADMIN — HYDROMORPHONE HYDROCHLORIDE 0.5 MG: 1 INJECTION, SOLUTION INTRAMUSCULAR; INTRAVENOUS; SUBCUTANEOUS at 10:27

## 2023-12-12 RX ADMIN — OXYCODONE HYDROCHLORIDE 10 MG: 5 TABLET ORAL at 12:16

## 2023-12-12 RX ADMIN — SUGAMMADEX 200 MG: 100 INJECTION, SOLUTION INTRAVENOUS at 10:54

## 2023-12-12 RX ADMIN — LIDOCAINE HYDROCHLORIDE 100 MG: 20 INJECTION, SOLUTION INFILTRATION; PERINEURAL at 08:06

## 2023-12-12 RX ADMIN — DOCUSATE SODIUM AND SENNOSIDES 1 TABLET: 8.6; 5 TABLET, FILM COATED ORAL at 20:19

## 2023-12-12 RX ADMIN — HYDROMORPHONE HYDROCHLORIDE 0.5 MG: 1 INJECTION, SOLUTION INTRAMUSCULAR; INTRAVENOUS; SUBCUTANEOUS at 11:04

## 2023-12-12 RX ADMIN — SODIUM CHLORIDE, POTASSIUM CHLORIDE, SODIUM LACTATE AND CALCIUM CHLORIDE: 600; 310; 30; 20 INJECTION, SOLUTION INTRAVENOUS at 08:01

## 2023-12-12 RX ADMIN — ALBUTEROL SULFATE 4 PUFF: 108 INHALANT RESPIRATORY (INHALATION) at 08:21

## 2023-12-12 RX ADMIN — PROPOFOL 200 MG: 10 INJECTION, EMULSION INTRAVENOUS at 08:06

## 2023-12-12 RX ADMIN — Medication 20 MG: at 09:18

## 2023-12-12 RX ADMIN — Medication 2 G: at 08:19

## 2023-12-12 RX ADMIN — FENTANYL CITRATE 50 MCG: 50 INJECTION INTRAMUSCULAR; INTRAVENOUS at 08:06

## 2023-12-12 RX ADMIN — FENTANYL CITRATE 50 MCG: 50 INJECTION INTRAMUSCULAR; INTRAVENOUS at 11:05

## 2023-12-12 RX ADMIN — PHENYLEPHRINE HYDROCHLORIDE 100 MCG: 10 INJECTION INTRAVENOUS at 09:18

## 2023-12-12 RX ADMIN — ACETAMINOPHEN 975 MG: 325 TABLET, FILM COATED ORAL at 12:13

## 2023-12-12 RX ADMIN — PHENYLEPHRINE HYDROCHLORIDE 100 MCG: 10 INJECTION INTRAVENOUS at 09:10

## 2023-12-12 RX ADMIN — GABAPENTIN 300 MG: 300 CAPSULE ORAL at 06:41

## 2023-12-12 RX ADMIN — OXYCODONE HYDROCHLORIDE 10 MG: 5 TABLET ORAL at 18:51

## 2023-12-12 RX ADMIN — PHENYLEPHRINE HYDROCHLORIDE 200 MCG: 10 INJECTION INTRAVENOUS at 09:21

## 2023-12-12 RX ADMIN — MIDAZOLAM 2 MG: 1 INJECTION INTRAMUSCULAR; INTRAVENOUS at 08:01

## 2023-12-12 RX ADMIN — FENTANYL CITRATE 50 MCG: 50 INJECTION INTRAMUSCULAR; INTRAVENOUS at 11:15

## 2023-12-12 RX ADMIN — SODIUM CHLORIDE, POTASSIUM CHLORIDE, SODIUM LACTATE AND CALCIUM CHLORIDE: 600; 310; 30; 20 INJECTION, SOLUTION INTRAVENOUS at 09:25

## 2023-12-12 RX ADMIN — HYDROMORPHONE HYDROCHLORIDE 0.4 MG: 1 INJECTION, SOLUTION INTRAMUSCULAR; INTRAVENOUS; SUBCUTANEOUS at 13:59

## 2023-12-12 RX ADMIN — PHENYLEPHRINE HYDROCHLORIDE 50 MCG: 10 INJECTION INTRAVENOUS at 10:12

## 2023-12-12 RX ADMIN — FENTANYL CITRATE 50 MCG: 50 INJECTION INTRAMUSCULAR; INTRAVENOUS at 11:50

## 2023-12-12 RX ADMIN — Medication 40 MG: at 08:06

## 2023-12-12 RX ADMIN — PHENYLEPHRINE HYDROCHLORIDE 0.2 MCG/KG/MIN: 10 INJECTION INTRAVENOUS at 09:23

## 2023-12-12 RX ADMIN — ONDANSETRON 4 MG: 2 INJECTION INTRAMUSCULAR; INTRAVENOUS at 10:39

## 2023-12-12 RX ADMIN — PHENYLEPHRINE HYDROCHLORIDE 100 MCG: 10 INJECTION INTRAVENOUS at 08:37

## 2023-12-12 RX ADMIN — ACETAMINOPHEN 975 MG: 325 TABLET, FILM COATED ORAL at 20:19

## 2023-12-12 RX ADMIN — ALBUTEROL SULFATE 4 PUFF: 108 INHALANT RESPIRATORY (INHALATION) at 10:51

## 2023-12-12 ASSESSMENT — ACTIVITIES OF DAILY LIVING (ADL)
ADLS_ACUITY_SCORE: 20
ADLS_ACUITY_SCORE: 35
ADLS_ACUITY_SCORE: 35
ADLS_ACUITY_SCORE: 20
ADLS_ACUITY_SCORE: 35
ADLS_ACUITY_SCORE: 20
ADLS_ACUITY_SCORE: 20

## 2023-12-12 ASSESSMENT — LIFESTYLE VARIABLES: TOBACCO_USE: 1

## 2023-12-12 NOTE — DISCHARGE SUMMARY
Date of Admission: 12/12/2023    Date of Discharge: 12/13/23    Admission/Discharge Physician: Emerita Jay MD    Indications for Admission: elective spine surgery    Admission Diagnosis/Discharge Diagnosis:  Lumbar disc herniation with radiculopathy [M51.16]     Date of Surgery: 12/12/23     Preoperative diagnosis: left L4-5, left L5-S1 disc herniations with left L5 and left S1 radiculopathy   Elevated creatinine levels with mild renal insufficiency  Obesity Body mass index is 30.64 kg/m .      Postoperative diagnosis: same   Procedure performed:  1.  Left L4-5, L5-S1 hemilaminectomy and microdiscectomy   2. Intraoperative microscope        Surgeon: Emerita Jay MD     Assistant:Evangelist Castellanos, PGY-6 spine fellow. There was no qualified resident available to assist in surgery      INDICATIONS FOR SURGERY:  Antonio Burger is a 48 year old male with severe radiculopathy referrable to disc herniations at both L4-5, and L5-S1.  The patient failed nonoperative management with severe debilitating ongoing symptoms, please refer to my clinic note for full details of presentation.      He had slightly elevated creatinine levels on preoperative labs as well as preoperative hypertension for which the PAC optimization clinic sent him to see a primary care provider to establish care.     I discussed risks versus benefits of operative intervention including:   Bleeding, infection, nerve root damage, numbness, weakness, CSF leak, failure to heal, development of facet instability with subsequent need for fusion, disc reherniation, failure to improve, and need for further surgery. The patient understood and elected to proceed.    Hospital Course:    Surgery was without complications  On POD 1, creatinine was stable.    Prior to discharge, patient was tolerating oral diet, nausea controlled, pain controlled with oral medication, voiding spontaneously, and was cleared by PT/OT for discharge to home.      Discharge  Medications:     Review of your medicines        START taking        Dose / Directions   acetaminophen 325 MG tablet  Commonly known as: TYLENOL  Used for: Lumbar disc herniation with radiculopathy, Lumbar radiculopathy      Dose: 650 mg  Take 2 tablets (650 mg) by mouth every 4 hours as needed for mild pain  Quantity: 50 tablet  Refills: 0     loratadine 10 MG tablet  Commonly known as: CLARITIN  Used for: Lumbar disc herniation with radiculopathy, S/P lumbar microdiscectomy      Dose: 10 mg  Take 1 tablet (10 mg) by mouth daily as needed for other (itching with opioid use)  Refills: 0     methocarbamol 750 MG tablet  Commonly known as: ROBAXIN  Used for: Lumbar disc herniation with radiculopathy, Lumbar radiculopathy      Dose: 750 mg  Take 1 tablet (750 mg) by mouth every 6 hours as needed for muscle spasms (muscle spasm)  Quantity: 60 tablet  Refills: 0     oxyCODONE 5 MG tablet  Commonly known as: ROXICODONE  Used for: Lumbar disc herniation with radiculopathy, Lumbar radiculopathy      Dose: 5-10 mg  Take 1-2 tablets (5-10 mg) by mouth every 4 hours as needed for moderate to severe pain  Quantity: 20 tablet  Refills: 0     senna-docusate 8.6-50 MG tablet  Commonly known as: SENOKOT-S/PERICOLACE  Used for: Lumbar disc herniation with radiculopathy, Lumbar radiculopathy      Dose: 1 tablet  Take 1 tablet by mouth daily  Quantity: 30 tablet  Refills: 0            CONTINUE these medicines which have NOT CHANGED        Dose / Directions   amphetamine-dextroamphetamine 30 MG tablet  Commonly known as: ADDERALL      Dose: 30 mg  Take 30 mg by mouth 4 times daily  Refills: 0     clobetasol 0.05 % external ointment  Commonly known as: TEMOVATE  Used for: Dermatitis      Apply twice daily as needed for itchy spots on legs.  Quantity: 60 g  Refills: 5     cyclobenzaprine 5 MG tablet  Commonly known as: FLEXERIL  Used for: Lumbar radiculopathy, Acute left-sided low back pain with left-sided sciatica, Lumbar disc  herniation with radiculopathy      Dose: 5 mg  Take 1 tablet (5 mg) by mouth 3 times daily as needed for muscle spasms  Quantity: 30 tablet  Refills: 0     Dupixent 300 MG/2ML prefilled syringe  Used for: Intrinsic atopic dermatitis  Generic drug: dupilumab      Dose: 300 mg  Inject 2 mLs (300 mg) Subcutaneous every 14 days  Quantity: 4 mL  Refills: 11     gabapentin 600 MG tablet  Commonly known as: NEURONTIN  Used for: Lumbar disc herniation with radiculopathy, Lumbar radiculopathy, Acute left-sided low back pain with left-sided sciatica      Dose: 600 mg  Take 1 tablet (600 mg) by mouth 3 times daily as needed for neuropathic pain  Refills: 0     triamcinolone 0.1 % external ointment  Commonly known as: KENALOG  Used for: Dermatitis      Apply twice daily to any itchy areas on hands, arms, or legs.  Quantity: 80 g  Refills: 11     valsartan 40 MG tablet  Commonly known as: DIOVAN  Used for: Benign essential hypertension      Dose: 40 mg  Take 1 tablet (40 mg) by mouth 2 times daily  Quantity: 60 tablet  Refills: 1     venlafaxine 37.5 MG 24 hr capsule  Commonly known as: EFFEXOR XR      Dose: 37.5 mg  Take 37.5 mg by mouth every morning  Refills: 0               Where to get your medicines        These medications were sent to [facility] [place], [place] - [address]  [address] [address]      Phone: [phone]   acetaminophen 325 MG tablet  methocarbamol 750 MG tablet  oxyCODONE 5 MG tablet  senna-docusate 8.6-50 MG tablet       Some of these will need a paper prescription and others can be bought over the counter. Ask your nurse if you have questions.    You don't need a prescription for these medications  loratadine 10 MG tablet           Discharge Instructions:    Resume your normal diet. See your primary care provider in the next 7 days for a blood pressure check.    Take anti-constipation medications as prescribed to prevent constipation while taking oral  opioid pain medications. If you go more than 48 hours without a bowel movement, increase the amount of anti-constipation medications you are taking, or call your PCP for assistance.    Keep incision covered and dry unless showering. OK to remove dressing to shower and let soapy water wash over the incision, and allow the incision to fully dry, then replace a dry bandage over incision to prevent clothing from rubbing incision.   Do not scrub or rub incision.   Do not submerge incision underwater- no tub baths, no swimming pools, no hot tubs until cleared by Neurosurgery. Do not put creams or ointments on incision.     If dressing becomes soiled or wet, change dressing immediately to dry dressing. Do not let wet dressing lay on incision. Keep incision dry at all times except while showering.    If clothing rubs against incision, cover incision with cotton dressing.  If incision ever develops consistent drainage or you develop a fever above 101.5 degrees, alert surgeon's team immediately.    No lifting above 10 pounds/no bending or twisting for 6 weeks after surgery.    No aspirin until cleared by postop followup appointment at 2 weeks.    If you did not have a fusion surgery, it is ok to take NSAIDs starting 48 hours after surgery. However, because your kidney function has been slightly impaired as  measured by Creatinine levels, we encourage you to use the pain medications that we discharge you with rather than NSAIDs. These medications include oxycodone for the first several days after surgery, Flexeril (cyclobenzaprine) as needed for muscle spasm, and Tylenol (acetaminophen). Use ice packs alongside but not directly over the top of your incision.    Follow up with Rosalba Norris PA-C, on 12/26/23 at 7am in person, [facility], [address] [place] [address], in Neurosurgery clinic on the 3rd floor. Call [phone] with appointment questions.    Call Dr. Jay' nurse coordinator Mike Jackson at  [phone] with questions about surgery.    If you are running low on discharge medications prior to a weekend or holiday, you must call [phone] to speak to Dr. Jay' nursing team Monday through Thursday 8am-3pm, refills will not be performed outside of those normal business hours. Refills will not be performed on weekends, evenings, or holidays- no exceptions.

## 2023-12-12 NOTE — PROGRESS NOTES
PACU to Inpatient Nursing Handoff    Patient Antonio Burger is a 48 year old male who speaks English.   Procedure Procedure(s):  Left Lumbar 4-5, Lumbar 5-Sacral 1 Hemilaminectomy and Microdiskectomy, with Microscope   Surgeon(s) Primary: Emerita Jay MD  Fellow - Assisting: Evangelist Castellanos MD     Allergies   Allergen Reactions    No Known Drug Allergy        Isolation  No active isolations     Past Medical History   has a past medical history of Anxiety, Depressive disorder, Eczema, Lipoma of other specified sites (01/01/2007), Narcolepsy without cataplexy in conditions classified elsewhere, and Tobacco use disorder (quit on 12/26/11).    Anesthesia General   Dermatome Level     Preop Meds gabapentin (Neurontin) - time given: 0641   Nerve block Not applicable   Intraop Meds albuterol (Proventil, Ventolin)  dexamethasone (Decadron)  fentanyl (Sublimaze): 200 mcg total  hydromorphone (Dilaudid): 0.5 mg total  ondansetron (Zofran): last given at 1039   Local Meds No   Antibiotics cefazolin (Ancef) - last given at 0800     Pain Patient Currently in Pain: yes   PACU meds  acetaminophen (Tylenol): 975 mg (total dose) last given at 1216   fentanyl (Sublimaze): 100 mcg (total dose) last given at 1150   oxycodone (Roxicodone): 10 mg (total dose) last given at 1216    PCA / epidural No   Capnography     Telemetry ECG Rhythm: Sinus rhythm;Sinus tachycardia   Inpatient Telemetry Monitor Ordered? No        Labs Glucose Lab Results   Component Value Date    GLC 98 12/12/2023    GLC 85 10/14/2012       Hgb Lab Results   Component Value Date    HGB 14.8 11/30/2023    HGB 15.2 10/14/2012       INR Lab Results   Component Value Date    INR 1.03 11/30/2023      PACU Imaging Not applicable     Wound/Incision Incision/Surgical Site 12/12/23 Lower;Midline Lumbar spine (Active)   Incision Assessment WDL except;Drainage 12/12/23 1205   Closure Liquid bandage;Sutures;Approximated 12/12/23 1104   Incision Drainage Amount  Scant 12/12/23 1205   Drainage Description Sanguinous 12/12/23 1200   Dressing Intervention Dressing marked 12/12/23 1205   Number of days: 0      CMS        Equipment Not applicable   Other LDA       IV Access Peripheral IV 12/12/23 Right;Dorsal Hand (Active)   Site Assessment WD 12/12/23 1200   Line Status Infusing 12/12/23 1200   Dressing Status clean;dry;intact 12/12/23 1200   Phlebitis Scale 0-->no symptoms 12/12/23 1200   Number of days: 0       Peripheral IV 12/12/23 Left Wrist (Active)   Site Assessment WD 12/12/23 1200   Line Status Saline locked 12/12/23 1200   Dressing Status clean;dry;intact 12/12/23 1200   Phlebitis Scale 0-->no symptoms 12/12/23 1200   Number of days: 0      Blood Products Not applicable EBL 25 mL   Intake/Output Date 12/12/23 0700 - 12/13/23 0659   Shift 3084-5081 7399-5526 1428-5328 24 Hour Total   INTAKE   P.O. 60   60   I.V. 1500   1500   Shift Total(mL/kg) 1560(17.07)   1560(17.07)   OUTPUT   Blood 25   25   Shift Total(mL/kg) 25(0.27)   25(0.27)   Weight (kg) 91.4 91.4 91.4 91.4      Drains / Singer     Time of void PreOp Time of Void Prior to Procedure: 0620 (12/12/23 0620)    PostOp      Diapered? No   Bladder Scan     PO 60 mL (12/12/23 1202)  crackers, water, and ice chips     Vitals    B/P: (!) 148/91  T: 97.9  F (36.6  C)    Temp src: Axillary  P:  Pulse: 110 (12/12/23 1215)          R: 10  O2:  SpO2: 96 %    O2 Device: Nasal cannula (12/12/23 1200)    Oxygen Delivery: 1 LPM (12/12/23 1200)         Family/support present Wife    Patient belongings     Patient transported on cart   DC meds/scripts (obs/outpt) Meds being held in discharge pharmacy   Inpatient Pain Meds Released? Yes       Special needs/considerations None   Tasks needing completion None       ANA MARIA SMITH RN  ASCOM [id number]

## 2023-12-12 NOTE — PROGRESS NOTES
Pt stated that he never uses a CPAP.RT offers hospitals CPAP if he wants to try it,but he says no.RN is aware.RT will continue to follow.

## 2023-12-12 NOTE — ANESTHESIA PROCEDURE NOTES
Airway       Patient location during procedure: OR       Procedure Start/Stop Times: 12/12/2023 8:13 AM  Staff -        CRNA: Nancy Overton APRN CRNA       Other Anesthesia Staff: Giancarlo Kang       Performed By: SRNA  Consent for Airway        Urgency: elective  Indications and Patient Condition       Indications for airway management: dipika-procedural       Induction type:intravenous       Mask difficulty assessment: 2 - vent by mask + OA or adjuvant +/- NMBA    Final Airway Details       Final airway type: endotracheal airway       Successful airway: ETT - single  Endotracheal Airway Details        ETT size (mm): 7.5       Cuffed: yes       Successful intubation technique: direct laryngoscopy       DL Blade Type: MAC 4       Grade View of Cords: 1       Adjucts: stylet       Position: Right       Measured from: gums/teeth       Secured at (cm): 22       Bite block used: None    Post intubation assessment        Placement verified by: capnometry        Number of attempts at approach: 1       Number of other approaches attempted: 0       Secured with: tape       Ease of procedure: easy       Dentition: Intact and Unchanged    Medication(s) Administered   Medication Administration Time: 12/12/2023 8:13 AM

## 2023-12-12 NOTE — TELEPHONE ENCOUNTER
PAPERWORK DOCUMENTATION    How Paperwork is received:  My Chart    Type of Paperwork: STD    Who is the paperwork for:  Patient    Received Date December 5, 2023    Who Received the paperwork:  Flaquita    Form to be Completed by:  Flaquita    Anticipated Completion Date: December 12th-14th, 2023.     Date Completed Form Faxed to Requested Entity

## 2023-12-12 NOTE — ANESTHESIA CARE TRANSFER NOTE
Patient: Antonio Burger    Procedure: Procedure(s):  Left Lumbar 4-5, Lumbar 5-Sacral 1 Hemilaminectomy and Microdiskectomy, with Microscope       Diagnosis: Lumbar disc herniation with radiculopathy [M51.16]  Diagnosis Additional Information: No value filed.    Anesthesia Type:   General     Note:    Oropharynx: oropharynx clear of all foreign objects  Level of Consciousness: awake  Oxygen Supplementation: face mask  Level of Supplemental Oxygen (L/min / FiO2): 6  Independent Airway: airway patency satisfactory and stable  Dentition: dentition unchanged  Vital Signs Stable: post-procedure vital signs reviewed and stable  Report to RN Given: handoff report given  Patient transferred to: PACU    Handoff Report: Identifed the Patient, Identified the Reponsible Provider, Reviewed the pertinent medical history, Discussed the surgical course, Reviewed Intra-OP anesthesia mangement and issues during anesthesia, Set expectations for post-procedure period and Allowed opportunity for questions and acknowledgement of understanding    Vitals:  Vitals Value Taken Time   /90 12/12/23 1108   Temp     Pulse 99 12/12/23 1112   Resp 15 12/12/23 1112   SpO2 100 % 12/12/23 1112   Vitals shown include unfiled device data.    Electronically Signed By: ELICIA Teixeira CRNA  December 12, 2023  11:14 AM

## 2023-12-12 NOTE — DISCHARGE INSTRUCTIONS
Resume your normal diet. See your primary care provider in the next 7 days for a blood pressure check.    Take anti-constipation medications as prescribed to prevent constipation while taking oral opioid pain medications. If you go more than 48 hours without a bowel movement, increase the amount of anti-constipation medications you are taking, or call your PCP for assistance.    Keep incision covered and dry unless showering. OK to remove dressing to shower and let soapy water wash over the incision, and allow the incision to fully dry, then replace a dry bandage over incision to prevent clothing from rubbing incision.   Do not scrub or rub incision.   Do not submerge incision underwater- no tub baths, no swimming pools, no hot tubs until cleared by Neurosurgery. Do not put creams or ointments on incision.     If dressing becomes soiled or wet, change dressing immediately to dry dressing. Do not let wet dressing lay on incision. Keep incision dry at all times except while showering.    If clothing rubs against incision, cover incision with cotton dressing.  If incision ever develops consistent drainage or you develop a fever above 101.5 degrees, alert surgeon's team immediately.    No lifting above 10 pounds/no bending or twisting for 6 weeks after surgery.    No aspirin until cleared by postop followup appointment at 2 weeks.    If you did not have a fusion surgery, it is ok to take NSAIDs starting 48 hours after surgery. However, because your kidney function has been slightly impaired as  measured by Creatinine levels, we encourage you to use the pain medications that we discharge you with rather than NSAIDs. These medications include oxycodone for the first several days after surgery, Flexeril (cyclobenzaprine) as needed for muscle spasm, and Tylenol (acetaminophen). Use ice packs alongside but not directly over the top of your incision.    Follow up with Rosalba Norris PA-C, on 12/26/23 at 7am in person, followup  in [facility], [address] [place] [address], in Neurosurgery clinic on the 3rd floor. Call [phone] with appointment questions.    Call Dr. Jay' nurse coordinator Mike Jackson at [phone] with questions about surgery.    If you are running low on discharge medications prior to a weekend or holiday, you must call [phone] to speak to Dr. Jay' nursing team Monday through Thursday 8am-3pm, refills will not be performed outside of those normal business hours. Refills will not be performed on weekends, evenings, or holidays- no exceptions.

## 2023-12-12 NOTE — ANESTHESIA POSTPROCEDURE EVALUATION
Patient: Antonio Burger    Procedure: Procedure(s):  Left Lumbar 4-5, Lumbar 5-Sacral 1 Hemilaminectomy and Microdiskectomy, with Microscope       Anesthesia Type:  General    Note:  Disposition: Admission   Postop Pain Control: Uneventful            Sign Out: Well controlled pain   PONV: No   Neuro/Psych: Uneventful            Sign Out: Acceptable/Baseline neuro status   Airway/Respiratory: Uneventful            Sign Out: Acceptable/Baseline resp. status   CV/Hemodynamics: Uneventful            Sign Out: Acceptable CV status; No obvious hypovolemia; No obvious fluid overload   Other NRE: NONE   DID A NON-ROUTINE EVENT OCCUR? No       Last vitals:  Vitals Value Taken Time   /91 12/12/23 1215   Temp 36.6  C (97.9  F) 12/12/23 1200   Pulse 89 12/12/23 1225   Resp 10 12/12/23 1225   SpO2 93 % 12/12/23 1225   Vitals shown include unfiled device data.    Electronically Signed By: Damaris Sheridan MD  December 12, 2023  12:27 PM

## 2023-12-12 NOTE — OP NOTE
Date of Surgery: 12/12/23    Preoperative diagnosis: left L4-5, left L5-S1 disc herniations with left L5 and left S1 radiculopathy   Elevated creatinine levels with mild renal insufficiency  Obesity Body mass index is 30.64 kg/m .     Postoperative diagnosis: same   Procedure performed:  1.  Left L4-5, L5-S1 hemilaminectomy and microdiscectomy   2. Intraoperative microscope      Surgeon: Emerita Jay MD    Assistant:Evangelist Castellanos, PGY-6 spine fellow. There was no qualified resident available to assist in surgery    Anesthesia- general endotracheal    EBL: 25 ml    INDICATIONS FOR SURGERY:  Antonio Burger is a 48 year old male with severe radiculopathy referrable to disc herniations at both L4-5, and L5-S1 on the left side. He had no right-leg symptoms.  The patient failed nonoperative management with severe debilitating ongoing symptoms, please refer to my clinic note for full details of presentation.     He had slightly elevated creatinine levels on preoperative labs as well as preoperative hypertension for which the PAC optimization clinic sent him to see a primary care provider to establish care.    I discussed risks versus benefits of operative intervention including:   Bleeding, infection, nerve root damage, numbness, weakness, CSF leak, failure to heal, development of facet instability with subsequent need for fusion, disc reherniation, failure to improve, and need for further surgery. The patient understood and elected to proceed.    OPERATIVE COURSE AND INTRAOPERATIVE FINDINGS:  The patient was identified and informed consent was verified.      The patient was taken to the operating room where general endotracheal anesthesia was induced.  Perioperative antibiotics were administered within 1 hour of skin incision.  The patient was positioned prone on the [place] frame and all extremity points were appropriately padded.  The [place] frame was elevated to create lumbar flexion to widen the lumbar intralaminar  space    The patient was prepped and draped in the standard sterile fashion.  The fluoroscopy was draped and brought into the field.  An intraoperative timeout was taken to correctly identify patient and procedure. The fluoroscope was used to localize correct level for skin incision.  The area of planned skin incision was infiltrated with local anesthetic.   A 10 blade scalpel was used to make midline skin incision.  Monopolar electrocautery was used to dissect the subcutaneous tissues and to open the lumbar fascia to the left of midline and all further dissection was carried out to the left of midline to expose the left L4-5 and left L5-S1 levels.      Self-retaining retractor was placed and fluoroscopy was used to verify correct level of operative location at left L4-5, followed by left L5-S1 using a spine time-out to verify correct level with fluoroscopy.     Beginning at L4-5, using fluoroscopic verification of level for a spine time-out, then the high-speed electric drill was used to drill a hemilaminectomy at the inferior aspect of the hemilamina and the curved curette was used to dissect the ligamentous attachment of the ligamentum flavum away from the underside of the lamina.  Kerrison #2 and #3 rongeurs were used to remove the ligamentum flavum and expose the lateral aspect of the thecal sac.   A D'Errico nerve root retractor was used to gently retract the thecal sac and traversing nerve root medially to expose the disc herniation and bipolar electrocautery was used to cauterize epidural veins that were clustered over the surface of the disc herniation to assist in hemostasis.  A 15 blade scalpel was used to incise the disc herniation making a small square window and a combination of pituitary rongeurs and down-biting Najma curettes were used to resect disc herniation. The disc herniation was resected from the lateral recess.  A blunt nerve probe was used to gently palpate ventromedially and irrigation  was used in the disc space to ensure all compressive components of disc fragments were removed. A blunt tip nerve probe was used to palpate the traversing nerve root course as well as the ventral aspect of thecal sac and no residual compression was remaining.  Hemostasis was verified.    We then turned our attention to L5-S1, the high-speed electric drill was used to drill a hemilaminectomy at the inferior aspect of the hemilamina and the curved curette was used to dissect the ligamentous attachment of the ligamentum flavum away from the underside of the lamina.  Kerrison #2 and #3 rongeurs were used to remove the ligamentum flavum and expose the lateral aspect of the thecal sac.   A ALEJANDRO'Dahlia nerve root retractor was used to gently retract the thecal sac and traversing nerve root medially to expose the disc herniation and bipolar electrocautery was used to cauterize epidural veins that were clustered over the surface of the disc herniation to assist in hemostasis.  A 15 blade scalpel was used to incise the disc herniation making a small square window and a combination of pituitary rongeurs and down-biting Najma curettes were used to resect disc herniation. The disc herniation was resected from the lateral recess and as medially as visualization was possible without removing more than 50% of the facet. A blunt nerve probe was used to gently palpate ventromedially and irrigation was used in the disc space to ensure all compressive components of disc fragments were removed. A blunt tip nerve probe was used to palpate the traversing nerve root course as well as the ventral aspect of thecal sac and no residual compression was remaining.  Hemostasis was verified.    Irrigation was again performed.     The incision was then closed using #1 Vicryl interrupted suture in the fascia followed by #1 Vicryl running suture in the fascia followed by 2-0 Vicryl inverted interrupted sutures in the subcutaneous layer followed by 2.0  Vicryl running suture in the subcutaneous layer, followed by 4-0 Monocryl subcuticular stitch on the skin, Dermabond, and a sterile dressing.    All sponge and needle counts are correct prior to proceeding with closure and again at the conclusion of closure.    There were no intraoperative complications.     I was scrubbed for the entire procedure and performed all key portions personally.    The patient was positioned supine on the transport gurney and taken to the PACU in stable condition where he will be admitted for observation of his creatinine and blood pressure overnight.     Emerita Jay MD    [facility] Department of Neurosurgery  Office: [phone]    12/12/2023  10:53 AM

## 2023-12-12 NOTE — PLAN OF CARE
"Goal Outcome Evaluation:    VSS BP (!) 145/87   Pulse 96   Temp 97.9  F (36.6  C) (Axillary)   Resp 12   Ht 1.727 m (5' 8\")   Wt 91.4 kg (201 lb 8 oz)   SpO2 99%   BMI 30.64 kg/m       O2 1 lpm NC denies SOB CP   Output Voding in bedside urinal   LBM 12/11/23 per pt report   Activity SBA, without device   Up for meal yes   Skin Visible skin intact except lumbar   Pain Yes 3/10 pt states pain was under control from tylenol given before transfer   Neuro/CMS A&OX4   Dressing CDI   Diet R-thin-whole   LDA 2 R/L piv SL   Plan Continue POC possible discharge tomorrow         "

## 2023-12-13 ENCOUNTER — APPOINTMENT (OUTPATIENT)
Dept: PHYSICAL THERAPY | Facility: CLINIC | Age: 48
End: 2023-12-13
Attending: ORTHOPAEDIC SURGERY
Payer: COMMERCIAL

## 2023-12-13 VITALS
HEART RATE: 76 BPM | WEIGHT: 201.5 LBS | DIASTOLIC BLOOD PRESSURE: 89 MMHG | BODY MASS INDEX: 30.54 KG/M2 | OXYGEN SATURATION: 96 % | RESPIRATION RATE: 18 BRPM | TEMPERATURE: 98.7 F | HEIGHT: 68 IN | SYSTOLIC BLOOD PRESSURE: 147 MMHG

## 2023-12-13 LAB
CREAT SERPL-MCNC: 1.23 MG/DL (ref 0.67–1.17)
EGFRCR SERPLBLD CKD-EPI 2021: 73 ML/MIN/1.73M2

## 2023-12-13 PROCEDURE — 97116 GAIT TRAINING THERAPY: CPT | Mod: GP

## 2023-12-13 PROCEDURE — 36415 COLL VENOUS BLD VENIPUNCTURE: CPT | Performed by: ORTHOPAEDIC SURGERY

## 2023-12-13 PROCEDURE — 97161 PT EVAL LOW COMPLEX 20 MIN: CPT | Mod: GP

## 2023-12-13 PROCEDURE — 97530 THERAPEUTIC ACTIVITIES: CPT | Mod: GP

## 2023-12-13 PROCEDURE — 250N000013 HC RX MED GY IP 250 OP 250 PS 637: Performed by: ORTHOPAEDIC SURGERY

## 2023-12-13 PROCEDURE — 82565 ASSAY OF CREATININE: CPT | Performed by: ORTHOPAEDIC SURGERY

## 2023-12-13 RX ORDER — LORATADINE 10 MG/1
10 TABLET ORAL DAILY PRN
COMMUNITY
Start: 2023-12-13 | End: 2024-02-08

## 2023-12-13 RX ORDER — GABAPENTIN 600 MG/1
600 TABLET ORAL 3 TIMES DAILY PRN
Start: 2023-12-13 | End: 2024-02-09

## 2023-12-13 RX ADMIN — POLYETHYLENE GLYCOL 3350 17 G: 17 POWDER, FOR SOLUTION ORAL at 08:34

## 2023-12-13 RX ADMIN — DOCUSATE SODIUM AND SENNOSIDES 1 TABLET: 8.6; 5 TABLET, FILM COATED ORAL at 08:34

## 2023-12-13 RX ADMIN — ACETAMINOPHEN 975 MG: 325 TABLET, FILM COATED ORAL at 12:31

## 2023-12-13 RX ADMIN — OXYCODONE HYDROCHLORIDE 10 MG: 5 TABLET ORAL at 00:09

## 2023-12-13 RX ADMIN — OXYCODONE HYDROCHLORIDE 10 MG: 5 TABLET ORAL at 04:20

## 2023-12-13 RX ADMIN — OXYCODONE HYDROCHLORIDE 10 MG: 5 TABLET ORAL at 12:31

## 2023-12-13 RX ADMIN — OXYCODONE HYDROCHLORIDE 10 MG: 5 TABLET ORAL at 08:34

## 2023-12-13 RX ADMIN — ACETAMINOPHEN 975 MG: 325 TABLET, FILM COATED ORAL at 04:19

## 2023-12-13 ASSESSMENT — ACTIVITIES OF DAILY LIVING (ADL)
ADLS_ACUITY_SCORE: 20

## 2023-12-13 NOTE — PLAN OF CARE
Goal Outcome Evaluation:       Pt left unit via w/c transport with wife at 12:45. Pt stable upon leaving unit.    Kim Melara RN on 12/13/2023 at 12:46 PM

## 2023-12-13 NOTE — PROGRESS NOTES
"Orthopedic Surgery Progress Note:     Subjective:   Doing well this AM. Tolerating PO. Reports relief of left leg pain. Does report some back pain this AM. Discussed plan to discharge pending kidney function.     Objective:   /79 (BP Location: Left arm)   Pulse 68   Temp 98.1  F (36.7  C) (Oral)   Resp 18   Ht 1.727 m (5' 8\")   Wt 91.4 kg (201 lb 8 oz)   SpO2 96%   BMI 30.64 kg/m    I/O this shift:  In: -   Out: 700 [Urine:700]  General: NAD. Resting comfortably in bed.  Respiratory: Breathing comfortably on RA.    Musculoskeletal: Dressing was clean and dry this AM.         Motor Strength Right Left   Hip flexion: L1, L2, L3 5/5 5/5        Knee flexion: S1 5/5 5/5   Knee extension: L3, L4 5/5 5/5   Ankle dosiflexion: L4, L5 5/5 5/5   EHL: L5 5/5 5/5   Ankle plantarflexion: S1 5/5 5/5     Sensation from L1-S2 is preserved.    Laboratory Data:  Lab Results   Component Value Date    WBC 5.1 11/30/2023    HGB 14.8 11/30/2023     11/30/2023    INR 1.03 11/30/2023       Images:  No new images were obtained.      Assessment & Plan:   Antonio Burger is a 48 year old male  s/p Left L4/5 and L5/S1 coreen-laminectomy and microdiscectomy on 12/12 with Dr. Jay.     Goals for today:  Ambulate  Discharge home     Spine Primary  - Activity: Up with assist until independent. No excessive bending or twisting. No lifting >10 lbs x 6 weeks. No Luis Fernando lift for transfers.   - Weightbearing Status: WBAT.  - Pain Management: Transition from IV to PO as tolerated. No NSAIDs.   - Diet: Begin with clear fluids and progress diet as tolerated.   - DVT Prophylaxis: SCDs only. No chemical DVT prophylaxis needed.  - Labs: Cr pending    - Dressings: Keep primapore C/D/I   - Physical Therapy/Occupational Therapy: Evaluation and treatment.  - Follow-up: Clinic with Dr. Jay in 6 weeks with repeat radiographs.    - Disposition: discharge home today     Orthopedic surgery staff for this patient is Dr. Jay .    Evangelist Castellanos, " MD  Spine Fellow     PLEASE PAGE ME directly with any questions/concerns during regular weekday hours before 5 pm. If there is no response, if it is a weekend, or if it is during evening hours then please page the orthopedic surgery resident on call.    FOLLOWUP:    Future Appointments   Date Time Provider Department Center   12/13/2023 10:00 AM Nata Kirkpatrick PT [facility]   12/26/2023  7:00 AM Rosalba Norris PA-C [facility]

## 2023-12-13 NOTE — PHARMACY-ADMISSION MEDICATION HISTORY
Pharmacist Admission Medication History    Admission medication history is complete. The information provided in this note is only as accurate as the sources available at the time of the update.    Information Source(s): Patient and CareEverywhere/SureScripts via in-person    Pertinent Information: He reported his valsartan came in the mail this morning, he has not yet started this medication. Since starting dupixent, he has not needed triamcinolone/clobetasol ointments in the past month. He reported that he is prescribed gabapentin 600 TID, but he doesn't find it very effective and only takes as needed. I wasn't able to find any fill history for venlafaxine, but patient was familiar with drug and indication and was confident he was still taking it.    Changes made to PTA medication list:  Added: None  Deleted:   Gabapentin (duplicate)    Changed:   Gabapentin 600mg TID -> 600mg TID prn    Medication Affordability: No issues identified        Allergies reviewed with patient and updates made in EHR: yes    Medication History Completed By: Antonio Xavier [facility] 12/12/2023 7:48 PM    PTA Med List   Medication Sig Last Dose    acetaminophen (TYLENOL) 325 MG tablet Take 2 tablets (650 mg) by mouth every 4 hours as needed for mild pain     amphetamine-dextroamphetamine (ADDERALL) 30 MG tablet Take 30 mg by mouth 4 times daily 12/11/2023    clobetasol (TEMOVATE) 0.05 % external ointment Apply twice daily as needed for itchy spots on legs. More than a month    cyclobenzaprine (FLEXERIL) 5 MG tablet Take 1 tablet (5 mg) by mouth 3 times daily as needed for muscle spasms Past Month    dupilumab (DUPIXENT) 300 MG/2ML prefilled syringe Inject 2 mLs (300 mg) Subcutaneous every 14 days Past Month    gabapentin (NEURONTIN) 600 MG tablet Take 1 tablet (600 mg) by mouth 3 times daily According to the chart provided Past Month    methocarbamol (ROBAXIN) 750 MG tablet Take 1 tablet (750 mg) by mouth every 6 hours as needed for muscle  spasms (muscle spasm)     oxyCODONE (ROXICODONE) 5 MG tablet Take 1-2 tablets (5-10 mg) by mouth every 4 hours as needed for moderate to severe pain     senna-docusate (SENOKOT-S/PERICOLACE) 8.6-50 MG tablet Take 1 tablet by mouth daily     triamcinolone (KENALOG) 0.1 % external ointment Apply twice daily to any itchy areas on hands, arms, or legs. More than a month    venlafaxine (EFFEXOR XR) 37.5 MG 24 hr capsule Take 37.5 mg by mouth every morning 12/11/2023

## 2023-12-13 NOTE — PLAN OF CARE
Goal Outcome Evaluation:       Pt is alert and oriented x4. Calm and cooperative with cares. VSS. Sating >95% on RA. On capno & continues pulse 'ox. Pt denies chest pain, SOB, N/T, and N/V. PIV x2 R&L-SL. SBA without device to independent per pt report. Pt was not out of bed this shift. Pt c/o back pain and rated 4-8/10. Pain was managed with schedule tylenol and prn oxy x2. Ice pack was applied for as cold therapy to surgical site. Voids adequately, and use bedside urinal. LBM 12/11 per pt report, passing flatus. Visible skin intact except lumbar. Incision side dressing C/D/I. On regular diet, thin liquid, takes meds whole. IS was encouraged. Sleep and rest was promoted. Bed alarm is on. Call light within reach, and able to make needs known. Plan: possible discharge today. Continue POC.

## 2023-12-13 NOTE — PROGRESS NOTES
[facility]  OUTPATIENT PHYSICAL THERAPY EVALUATION  PLAN OF TREATMENT FOR OUTPATIENT REHABILITATION  (COMPLETE FOR INITIAL CLAIMS ONLY)  Patient's Last Name, First Name, M.I.  YOB: 1975  Antonio Burger                        Provider's Name  [facility] Medical Record No.  [id number]                             Onset Date:  12/12/23   Start of Care Date:  12/13/23   Type:     _X_PT   ___OT   ___SLP Medical Diagnosis:  s/p Left L4/5 and L5/S1 coreen-laminectomy and microdiscectomy              PT Diagnosis:  Impaired functional mobility Visits from SOC:  1     See note for plan of treatment, functional goals and certification details    I CERTIFY THE NEED FOR THESE SERVICES FURNISHED UNDER        THIS PLAN OF TREATMENT AND WHILE UNDER MY CARE     (Physician co-signature of this document indicates review and certification of the therapy plan).                  12/13/23 1000   Appointment Info   Signing Clinician's Name / Credentials (PT) Nata Kirkpatrick DPT   Rehab Comments (PT) spinal prec.   Living Environment   People in Home spouse   Current Living Arrangements house   Home Accessibility stairs within home   Number of Stairs, Within Home, Primary greater than 10 stairs   Stair Railings, Within Home, Primary railings on both sides of stairs   Transportation Anticipated car, drives self;family or friend will provide   Living Environment Comments Pt lives in house with wife, no CONSTANCE + flight of stairs to 2nd level. Per pt, all needs met on main level and has plan to sleep on main level for now.   Self-Care   Usual Activity Tolerance good   Current Activity Tolerance good   Regular Exercise No   Equipment Currently Used at Home none   Fall history within last six months no   Activity/Exercise/Self-Care Comment Pt is IND for all ADLs, IADLs, and mobility at baseline. Had decreased activity tolerance over the last few months due to pain, however,  is motivated to get back to more active lifestyle. Works desk job.   General Information   Onset of Illness/Injury or Date of Surgery 12/12/23   Referring Physician Evangelist Castellanos MD   Patient/Family Therapy Goals Statement (PT) Return home with IND mobility   Pertinent History of Current Problem (include personal factors and/or comorbidities that impact the POC) Antonio Burger is a 48 year old male  s/p Left L4/5 and L5/S1 coreen-laminectomy and microdiscectomy on 12/12 with Dr. Jay.   Existing Precautions/Restrictions spinal   Weight-Bearing Status - LUE partial weight-bearing (% in comments)   Weight-Bearing Status - RUE partial weight-bearing (% in comments)   Weight-Bearing Status - LLE full weight-bearing   Weight-Bearing Status - RLE full weight-bearing   Cognition   Affect/Mental Status (Cognition) WFL   Orientation Status (Cognition) oriented x 4   Follows Commands (Cognition) WFL   Pain Assessment   Patient Currently in Pain Yes, see Vital Sign flowsheet   Integumentary/Edema   Integumentary/Edema no deficits were identifed   Posture    Posture Forward head position   Range of Motion (ROM)   Range of Motion ROM is WFL   Strength (Manual Muscle Testing)   Strength (Manual Muscle Testing) strength is WFL;Able to perform R SLR;Able to perform L SLR   Bed Mobility   Bed Mobility bed mobility (all) activities   All Activities, [place] (Bed Mobility) supervision;verbal cues   Bed Mobility Limitations decreased ability to use arms for pushing/pulling   Impairments Contributing to Impaired Bed Mobility pain   Transfers   Transfers sit-stand transfer;toilet transfer   Sit-Stand Transfer   Sit-Stand [place] (Transfers) independent   Toilet Transfer   [place] Level (Toilet Transfer) independent   Type (Toilet Transfer) sit-stand;stand-sit   Gait/Stairs (Locomotion)   [place] Level (Gait) supervision;verbal cues   Distance in Feet (Gait) 50'   Negotiation (Stairs) stairs independence;handrail  location;number of steps;ascending technique;descending technique   [place] Level (Stairs) modified independence   Handrail Location (Stairs) both sides   Number of Steps (Stairs) 3   Ascending Technique (Stairs) step-over-step   Descending Technique (Stairs) step-over-step   Balance   Balance no deficits were identified   Sensory Examination   Sensory Perception patient reports no sensory changes   Coordination   Coordination no deficits were identified   Muscle Tone   Muscle Tone no deficits were identified   Clinical Impression   Criteria for Skilled Therapeutic Intervention Yes, treatment indicated   PT Diagnosis (PT) Impaired functional mobility   Influenced by the following impairments pain, spinal prec   Functional limitations due to impairments bed mobility, transfers, gait, stairs, functional endurance   Clinical Presentation (PT Evaluation Complexity) stable   Clinical Presentation Rationale clinical judgement   Clinical Decision Making (Complexity) low complexity   Planned Therapy Interventions (PT) balance training;bed mobility training;gait training;home exercise program;patient/family education;postural re-education;ROM (range of motion);stair training;strengthening;transfer training;progressive activity/exercise;risk factor education;home program guidelines   Risk & Benefits of therapy have been explained evaluation/treatment results reviewed;care plan/treatment goals reviewed;risks/benefits reviewed;current/potential barriers reviewed;participants voiced agreement with care plan;participants included;patient   PT Total Evaluation Time   PT Eval, Low Complexity Minutes (52233) 8   Therapy Certification   Start of care date 12/13/23   Certification date from 12/13/23   Certification date to 12/27/23   Medical Diagnosis s/p Left L4/5 and L5/S1 coreen-laminectomy and microdiscectomy   Physical Therapy Goals   PT Frequency One time eval and treatment only   PT Predicted Duration/Target Date for Goal  Attainment 12/27/23   PT Goals Bed Mobility;Transfers;Gait;Stairs   PT: Bed Mobility Independent;Supine to/from sit;Rolling;Within precautions;Goal Met   PT: Transfers Independent;Sit to/from stand;Bed to/from chair;Within precautions;Goal Met   PT: Gait Independent;Within precautions;Greater than 200 feet;Goal Met   PT: Stairs Supervision/stand-by assist;1 stair;Within precautions;Rail on both sides;Goal Met  (to mimic curb with community mobility)   Therapeutic Activity   Therapeutic Activities: dynamic activities to improve functional performance Minutes (54961) 15   Symptoms Noted During/After Treatment Increased pain   Treatment Detail/Skilled Intervention Pt greeted supine in bed, noting increased pain in low back. Verbally reviewed spinal precautions and appropriate activity modifications. Instructed in bed mobility via logroll technique, pt completed supine<>sit with SBA and VCs for technique only. Demo'ing excellent adherence to precautions and tolerance for transfers. Facilitated sit<>stands x4 reps throughout session with SBA-IND, demo'ing good LE strength. Following OOR activity, pt opting to sit up in recliner. Educated on goal to remain active during recovery, pt motivated and agreeable. Left comfortable with all needs met.   Gait Training   Gait Training Minutes (00945) 12   Symptoms Noted During/After Treatment (Gait Training) none   Treatment Detail/Skilled Intervention To increase activity tolerance and functional independence with gait, pt ambulated ~200' in hallway with SBA-IND. Demo'ing good tolerance. Initially stiff through upper body, however, demo'ing more natural arm swing later in session. Able to maintain comfortable conversation throughout. Instructed in safe navigation of stairs using bilat railings, pt ascended/descended x3 steps with SBA using reciprocal pattern. Discussed safety with full flight of stairs at home should pt decide to sleep upstairs.   PT Discharge Planning   PT Plan  DC   PT Discharge Recommendation (DC Rec) home with assist   PT Rationale for DC Rec Pt is near baseline mobility, limited primarily by pain and spinal precautions. Pt able to demo safety and independence with transfers and upright mobility today. Safe to dc home with prn assist from wife once medically ready.   PT Brief overview of current status IND   Total Session Time   Timed Code Treatment Minutes 27   Total Session Time (sum of timed and untimed services) 35

## 2023-12-15 ENCOUNTER — DOCUMENTATION ONLY (OUTPATIENT)
Dept: OTHER | Facility: CLINIC | Age: 48
End: 2023-12-15
Payer: COMMERCIAL

## 2023-12-26 ENCOUNTER — OFFICE VISIT (OUTPATIENT)
Dept: NEUROSURGERY | Facility: CLINIC | Age: 48
End: 2023-12-26
Payer: COMMERCIAL

## 2023-12-26 VITALS
WEIGHT: 206.4 LBS | SYSTOLIC BLOOD PRESSURE: 149 MMHG | BODY MASS INDEX: 30.57 KG/M2 | DIASTOLIC BLOOD PRESSURE: 100 MMHG | HEIGHT: 69 IN | RESPIRATION RATE: 16 BRPM | OXYGEN SATURATION: 98 % | HEART RATE: 94 BPM

## 2023-12-26 DIAGNOSIS — M54.16 LUMBAR RADICULOPATHY: ICD-10-CM

## 2023-12-26 DIAGNOSIS — R29.898 WEAKNESS OF LEFT LOWER EXTREMITY: Primary | ICD-10-CM

## 2023-12-26 DIAGNOSIS — Z98.890 STATUS POST LUMBAR SURGERY: ICD-10-CM

## 2023-12-26 PROCEDURE — 99024 POSTOP FOLLOW-UP VISIT: CPT | Performed by: PHYSICIAN ASSISTANT

## 2023-12-26 NOTE — NURSING NOTE
Chief Complaint   Patient presents with    RECHECK     Here for a two week follow up, confirmed with patient     Jackie Anderson

## 2023-12-26 NOTE — LETTER
12/26/2023       RE: Antonio Burger  [address]  [place] [address]       Dear Colleague,    Thank you for referring your patient, Antonio Burger, to the [facility] NEUROSURGERY CLINIC [facility]. Please see a copy of my visit note below.        Neurosurgery Clinic Note    Chief Complaint: 2 week post-op visit    DATE OF VISIT: 12/26/2023      Date of Admission: 12/12/2023     Date of Discharge: 12/13/23     Admission/Discharge Physician: Emerita Jay MD     Indications for Admission: elective spine surgery     Admission Diagnosis/Discharge Diagnosis:  Lumbar disc herniation with radiculopathy [M51.16]      Date of Surgery: 12/12/23     Preoperative diagnosis: left L4-5, left L5-S1 disc herniations with left L5 and left S1 radiculopathy   Elevated creatinine levels with mild renal insufficiency  Obesity Body mass index is 30.64 kg/m .      Postoperative diagnosis: same   Procedure performed:  1.  Left L4-5, L5-S1 hemilaminectomy and microdiscectomy   2. Intraoperative microscope        Surgeon: Emerita Jay MD     Assistant:Evangelist Castellanos, PGY-6 spine fellow. There was no qualified resident available to assist in surgery      INDICATIONS FOR SURGERY:  Antonio Burger is a 48 year old male with severe radiculopathy referrable to disc herniations at both L4-5, and L5-S1.  The patient failed nonoperative management with severe debilitating ongoing symptoms, please refer to my clinic note for full details of presentation.      He had slightly elevated creatinine levels on preoperative labs as well as preoperative hypertension for which the PAC optimization clinic sent him to see a primary care provider to establish care.     I discussed risks versus benefits of operative intervention including:   Bleeding, infection, nerve root damage, numbness, weakness, CSF leak, failure to heal, development of facet instability with subsequent need  "for fusion, disc reherniation, failure to improve, and need for further surgery. The patient understood and elected to proceed.     Hospital Course:     Surgery was without complications  On POD 1, creatinine was stable.     Prior to discharge, patient was tolerating oral diet, nausea controlled, pain controlled with oral medication, voiding spontaneously, and was cleared by PT/OT for discharge to home.    HPI: Antonio Burger is a pleasant 48 year old male who is s/p L4-S1 hemilam, microdisc by Dr. Jay on 12/12/23.  Prior to surgery patient was experiencing left foot weakness, single-leg calf-raise asymmetry and difficulty with toe & heel walking.     Currently, patient is 2 weeks out from surgery.  He is doing well with resolution of the radicular pain, but does have some residual soreness in his left buttock and mild incisional pain.  He is not needing any narcotic pain medication, just taking Tylenol as needed.  No b/b issues.  He is working on increasing ambulation and does about 6200 steps per day.  He is on leave from work for about 6 weeks.  He is an .  He is working with PCP on HTN and has not yet started his medication, but intends to do so this week.  He denies issues with his incision, but does have a suture hanging out at the lower part of his incision.       Physical Exam   BP (!) 149/100 (BP Location: Right arm, Patient Position: Sitting, Cuff Size: Adult Large)   Pulse 94   Resp 16   Ht 1.76 m (5' 9.29\")   Wt 93.6 kg (206 lb 6.4 oz)   SpO2 98%   BMI 30.22 kg/m      Constitutional: Alert, no acute distress.    [place]:  ambulates without assistance  Trouble with toe and heel walking, able to do single leg calf raises grossly equal    Neurological:    Strength (L/R)  5/5 Hip Flexion  4+/5 Knee Extension (trouble with extending left leg w/o using muscle recruitement)  4/5 Ankle Dorsiflexion  4/5 Extensor Hallucis Longus  5/5 Plantar Flexion  5/5 Foot Eversion  4/5 Foot " Inversion    Sensation: SILT    Incision:  Clean, dry and intact.  No erythema, induration or fluctuance.  No drainage noted.  Monocyrl, dermabond present    IMAGING:  No new imaging.    ASSESSMENT:  Antonio Burger is a 48 year old male who is s/p L4-S1 hemilam, microdisc by Dr. Jay on 12/12/23.  Prior to surgery patient was experiencing left foot weakness, single-leg calf-raise asymmetry and difficulty with toe & heel walking.    Patient is about 2 weeks out from surgery and is doing well in his recovery.  Resolution of radicular pain.  Continues to have left foot and quad weakness.      PLAN:    You may now use NSAIDs, in additional to Tylenol, muscle relaxer, ice/heat for pain.      Okay to shower and get incision wet.  No submerging/soaking it until it is fully healed (around 4 weeks post-op).   Cover portions of incision with a breathable dressing that may get irritated by clothing rubbing on it.  Continue to increase time ambulating, avoid bending, twisting, strenuous exercise, or heavy lifting (greater than 10 pounds).   No driving while using narcotics or other sedating medications, such as sleep aids, muscle relaxants, etc.    HTN per PCP.  Okay to start meds for this.     Follow up in 4 weeks without imaging with me.    Referral placed for PT.      Patient is in agreement with this plan and states no further questions.            Again, thank you for allowing me to participate in the care of your patient.      Sincerely,    Rosalba Norris PA-C

## 2023-12-26 NOTE — PROGRESS NOTES
Neurosurgery Clinic Note    Chief Complaint: 2 week post-op visit    DATE OF VISIT: 12/26/2023      Date of Admission: 12/12/2023     Date of Discharge: 12/13/23     Admission/Discharge Physician: Emerita Jay MD     Indications for Admission: elective spine surgery     Admission Diagnosis/Discharge Diagnosis:  Lumbar disc herniation with radiculopathy [M51.16]      Date of Surgery: 12/12/23     Preoperative diagnosis: left L4-5, left L5-S1 disc herniations with left L5 and left S1 radiculopathy   Elevated creatinine levels with mild renal insufficiency  Obesity Body mass index is 30.64 kg/m .      Postoperative diagnosis: same   Procedure performed:  1.  Left L4-5, L5-S1 hemilaminectomy and microdiscectomy   2. Intraoperative microscope        Surgeon: Emerita Jay MD     Assistant:Evangelist Castellanos, PGY-6 spine fellow. There was no qualified resident available to assist in surgery      INDICATIONS FOR SURGERY:  Antonio Burger is a 48 year old male with severe radiculopathy referrable to disc herniations at both L4-5, and L5-S1.  The patient failed nonoperative management with severe debilitating ongoing symptoms, please refer to my clinic note for full details of presentation.      He had slightly elevated creatinine levels on preoperative labs as well as preoperative hypertension for which the PAC optimization clinic sent him to see a primary care provider to establish care.     I discussed risks versus benefits of operative intervention including:   Bleeding, infection, nerve root damage, numbness, weakness, CSF leak, failure to heal, development of facet instability with subsequent need for fusion, disc reherniation, failure to improve, and need for further surgery. The patient understood and elected to proceed.     Hospital Course:     Surgery was without complications  On POD 1, creatinine was stable.     Prior to discharge, patient was tolerating oral diet, nausea controlled, pain controlled  "with oral medication, voiding spontaneously, and was cleared by PT/OT for discharge to home.    HPI: Antonio Burger is a pleasant 48 year old male who is s/p L4-S1 hemilam, microdisc by Dr. Jay on 12/12/23.  Prior to surgery patient was experiencing left foot weakness, single-leg calf-raise asymmetry and difficulty with toe & heel walking.     Currently, patient is 2 weeks out from surgery.  He is doing well with resolution of the radicular pain, but does have some residual soreness in his left buttock and mild incisional pain.  He is not needing any narcotic pain medication, just taking Tylenol as needed.  No b/b issues.  He is working on increasing ambulation and does about 6200 steps per day.  He is on leave from work for about 6 weeks.  He is an .  He is working with PCP on HTN and has not yet started his medication, but intends to do so this week.  He denies issues with his incision, but does have a suture hanging out at the lower part of his incision.       Physical Exam   BP (!) 149/100 (BP Location: Right arm, Patient Position: Sitting, Cuff Size: Adult Large)   Pulse 94   Resp 16   Ht 1.76 m (5' 9.29\")   Wt 93.6 kg (206 lb 6.4 oz)   SpO2 98%   BMI 30.22 kg/m      Constitutional: Alert, no acute distress.    [place]:  ambulates without assistance  Trouble with toe and heel walking, able to do single leg calf raises grossly equal    Neurological:    Strength (L/R)  5/5 Hip Flexion  4+/5 Knee Extension (trouble with extending left leg w/o using muscle recruitement)  4/5 Ankle Dorsiflexion  4/5 Extensor Hallucis Longus  5/5 Plantar Flexion  5/5 Foot Eversion  4/5 Foot Inversion    Sensation: SILT    Incision:  Clean, dry and intact.  No erythema, induration or fluctuance.  No drainage noted.  Monocyrl, dermabond present    IMAGING:  No new imaging.    ASSESSMENT:  Antonio Burger is a 48 year old male who is s/p L4-S1 hemilam, microdisc by Dr. Jay on 12/12/23.  Prior to surgery patient was " experiencing left foot weakness, single-leg calf-raise asymmetry and difficulty with toe & heel walking.    Patient is about 2 weeks out from surgery and is doing well in his recovery.  Resolution of radicular pain.  Continues to have left foot and quad weakness.      PLAN:    You may now use NSAIDs, in additional to Tylenol, muscle relaxer, ice/heat for pain.      Okay to shower and get incision wet.  No submerging/soaking it until it is fully healed (around 4 weeks post-op).   Cover portions of incision with a breathable dressing that may get irritated by clothing rubbing on it.  Continue to increase time ambulating, avoid bending, twisting, strenuous exercise, or heavy lifting (greater than 10 pounds).   No driving while using narcotics or other sedating medications, such as sleep aids, muscle relaxants, etc.    HTN per PCP.  Okay to start meds for this.     Follow up in 4 weeks without imaging with me.    Referral placed for PT.      Patient is in agreement with this plan and states no further questions.      Rosalba Norris PA-C  Department of Neurosurgery  Office: [phone]

## 2024-02-05 ENCOUNTER — TELEPHONE (OUTPATIENT)
Dept: DERMATOLOGY | Facility: CLINIC | Age: 49
End: 2024-02-05
Payer: COMMERCIAL

## 2024-02-05 NOTE — TELEPHONE ENCOUNTER
PA Needed    Medication: DUPIXENT  QTY/DS:   NEW INS: [employer]  Insurance Company:  [employer]  Pharmacy Filling the Rx:  [facility]  PA :  YES  Date of last fill: 24

## 2024-02-06 ENCOUNTER — OFFICE VISIT (OUTPATIENT)
Dept: NEUROSURGERY | Facility: CLINIC | Age: 49
End: 2024-02-06
Attending: PHYSICIAN ASSISTANT
Payer: COMMERCIAL

## 2024-02-06 VITALS
HEART RATE: 88 BPM | HEIGHT: 69 IN | SYSTOLIC BLOOD PRESSURE: 150 MMHG | WEIGHT: 200 LBS | RESPIRATION RATE: 16 BRPM | DIASTOLIC BLOOD PRESSURE: 93 MMHG | BODY MASS INDEX: 29.62 KG/M2 | OXYGEN SATURATION: 98 %

## 2024-02-06 DIAGNOSIS — Z98.890 STATUS POST LUMBAR SURGERY: Primary | ICD-10-CM

## 2024-02-06 DIAGNOSIS — R29.898 WEAKNESS OF LEFT LOWER EXTREMITY: ICD-10-CM

## 2024-02-06 PROCEDURE — 99024 POSTOP FOLLOW-UP VISIT: CPT | Performed by: PHYSICIAN ASSISTANT

## 2024-02-06 ASSESSMENT — PAIN SCALES - GENERAL: PAINLEVEL: NO PAIN (0)

## 2024-02-06 NOTE — LETTER
2/6/2024       RE: Antonio Burger  [address]  [place] [address]       Dear Colleague,    Thank you for referring your patient, Antonio Burger, to the [facility] NEUROSURGERY CLINIC [facility]. Please see a copy of my visit note below.        Neurosurgery Clinic Note    Chief Complaint: 6 week post-op visit    DATE OF VISIT: 2/6/24      Date of Admission: 12/12/2023     Date of Discharge: 12/13/23     Admission/Discharge Physician: Emerita Jay MD     Indications for Admission: elective spine surgery     Admission Diagnosis/Discharge Diagnosis:  Lumbar disc herniation with radiculopathy [M51.16]      Date of Surgery: 12/12/23     Preoperative diagnosis: left L4-5, left L5-S1 disc herniations with left L5 and left S1 radiculopathy   Elevated creatinine levels with mild renal insufficiency  Obesity Body mass index is 30.64 kg/m .      Postoperative diagnosis: same   Procedure performed:  1.  Left L4-5, L5-S1 hemilaminectomy and microdiscectomy   2. Intraoperative microscope        Surgeon: Emerita Jay MD     Assistant:Evangelist Castellanos, PGY-6 spine fellow. There was no qualified resident available to assist in surgery      INDICATIONS FOR SURGERY:  Antonio Burger is a 48 year old male with severe radiculopathy referrable to disc herniations at both L4-5, and L5-S1.  The patient failed nonoperative management with severe debilitating ongoing symptoms, please refer to my clinic note for full details of presentation.      He had slightly elevated creatinine levels on preoperative labs as well as preoperative hypertension for which the PAC optimization clinic sent him to see a primary care provider to establish care.     I discussed risks versus benefits of operative intervention including:   Bleeding, infection, nerve root damage, numbness, weakness, CSF leak, failure to heal, development of facet instability with subsequent need for  fusion, disc reherniation, failure to improve, and need for further surgery. The patient understood and elected to proceed.     Hospital Course:     Surgery was without complications  On POD 1, creatinine was stable.     Prior to discharge, patient was tolerating oral diet, nausea controlled, pain controlled with oral medication, voiding spontaneously, and was cleared by PT/OT for discharge to home.    HPI: Antonio Burger is a pleasant 48 year old male who is s/p L4-S1 hemilam, microdisc by Dr. Jay on 12/12/23.  Prior to surgery patient was experiencing left foot weakness, single-leg calf-raise asymmetry and difficulty with toe & heel walking.     12/26/2023 Visit - 2 weeks out from surgery.  He is doing well with resolution of the radicular pain, but does have some residual soreness in his left buttock and mild incisional pain.  He is not needing any narcotic pain medication, just taking Tylenol as needed.  No b/b issues.  He is working on increasing ambulation and does about 6200 steps per day.  He is on leave from work for about 6 weeks.  He is an .  He is working with PCP on HTN and has not yet started his medication, but intends to do so this week.  He denies issues with his incision, but does have a suture hanging out at the lower part of his incision.      2/6/24 Visit - 6 weeks p/o.  He feels really good with the results of the surgery.  Still catching his left leg sometimes when he is walking.  Has been working online with some friends who are in therapy and doing some home exercises and walking 2-3 miles per day on a regular basis.  There is an area near the bottom of his incision that  bleeds a little when he gets down with a shower.  This was were the incision was sticking out previously.  He denies fevers, night sweats, chills or significant pain in this area.       Physical Exam   BP (!) 150/93 (BP Location: Right arm, Patient Position: Sitting)   Pulse 88   Resp 16   Ht 1.753 m (5'  "9\")   Wt 90.7 kg (200 lb)   SpO2 98%   BMI 29.53 kg/m      Constitutional: Alert, no acute distress.    [place]:  ambulates without assistance  Unable to heel walk w/o left foot dropping down, slight issues with toe walking on left only.  Performs single leg calf raises equally well.     Neurological:    Strength (L/R)  5/5 Hip Flexion  5-(very slight)/5 Knee Extension   4/5 Ankle Dorsiflexion  4+/5 Extensor Hallucis Longus  5/5 Plantar Flexion  5/5 Foot Eversion  5/5 Foot Inversion    Sensation: SILT    Incision:  Healing well, small pinpoint area in the lower aspect of incision with small amount of bloody drainage when pressed.  No purulency or significant pain.  No redness or swelling.      IMAGING:  No new imaging.    ASSESSMENT:  Antonio Burger is a 48 year old male who is s/p L4-S1 hemilam, microdisc by Dr. Jay on 12/12/23.  Prior to surgery patient was experiencing left foot weakness, single-leg calf-raise asymmetry and difficulty with toe & heel walking.    Patient is about 6 weeks out from surgery.  He has improved with strength in his left leg, but still having some issues with heal walking and noticing this when he is walking.  Small pinpoint area in incision that has not yet healed.  Not concerned for infection.      PLAN:    May lift up to 20 pounds for the next month and then increase gradually in 5 pound increments as tolerated up to 50 pounds.       Monitor incision and clean daily.  Advised not to use anything but soap and water. Cover lightly if there is leakage.      Hypertension management - per PCP, has started meds.      Follow up in 6 weeks without imaging with Dr. Jay.      Advised to contact PT for formal assessment and learn exercises to promote strengthening of left foot.      Patient is in agreement with this plan and states no further questions.          Again, thank you for allowing me to participate in the care of your patient.      Sincerely,    Rosalba Norris PA-C    "

## 2024-02-06 NOTE — PROGRESS NOTES
Neurosurgery Clinic Note    Chief Complaint: 6 week post-op visit    DATE OF VISIT: 2/6/24      Date of Admission: 12/12/2023     Date of Discharge: 12/13/23     Admission/Discharge Physician: Emerita Jay MD     Indications for Admission: elective spine surgery     Admission Diagnosis/Discharge Diagnosis:  Lumbar disc herniation with radiculopathy [M51.16]      Date of Surgery: 12/12/23     Preoperative diagnosis: left L4-5, left L5-S1 disc herniations with left L5 and left S1 radiculopathy   Elevated creatinine levels with mild renal insufficiency  Obesity Body mass index is 30.64 kg/m .      Postoperative diagnosis: same   Procedure performed:  1.  Left L4-5, L5-S1 hemilaminectomy and microdiscectomy   2. Intraoperative microscope        Surgeon: Emerita Jay MD     Assistant:Evangelist Castellanos, PGY-6 spine fellow. There was no qualified resident available to assist in surgery      INDICATIONS FOR SURGERY:  Antonio Burger is a 48 year old male with severe radiculopathy referrable to disc herniations at both L4-5, and L5-S1.  The patient failed nonoperative management with severe debilitating ongoing symptoms, please refer to my clinic note for full details of presentation.      He had slightly elevated creatinine levels on preoperative labs as well as preoperative hypertension for which the PAC optimization clinic sent him to see a primary care provider to establish care.     I discussed risks versus benefits of operative intervention including:   Bleeding, infection, nerve root damage, numbness, weakness, CSF leak, failure to heal, development of facet instability with subsequent need for fusion, disc reherniation, failure to improve, and need for further surgery. The patient understood and elected to proceed.     Hospital Course:     Surgery was without complications  On POD 1, creatinine was stable.     Prior to discharge, patient was tolerating oral diet, nausea controlled, pain controlled with  "oral medication, voiding spontaneously, and was cleared by PT/OT for discharge to home.    HPI: Antonio Burger is a pleasant 48 year old male who is s/p L4-S1 hemilam, microdisc by Dr. Jay on 12/12/23.  Prior to surgery patient was experiencing left foot weakness, single-leg calf-raise asymmetry and difficulty with toe & heel walking.     12/26/2023 Visit - 2 weeks out from surgery.  He is doing well with resolution of the radicular pain, but does have some residual soreness in his left buttock and mild incisional pain.  He is not needing any narcotic pain medication, just taking Tylenol as needed.  No b/b issues.  He is working on increasing ambulation and does about 6200 steps per day.  He is on leave from work for about 6 weeks.  He is an .  He is working with PCP on HTN and has not yet started his medication, but intends to do so this week.  He denies issues with his incision, but does have a suture hanging out at the lower part of his incision.      2/6/24 Visit - 6 weeks p/o.  He feels really good with the results of the surgery.  Still catching his left leg sometimes when he is walking.  Has been working online with some friends who are in therapy and doing some home exercises and walking 2-3 miles per day on a regular basis.  There is an area near the bottom of his incision that  bleeds a little when he gets down with a shower.  This was were the incision was sticking out previously.  He denies fevers, night sweats, chills or significant pain in this area.       Physical Exam   BP (!) 150/93 (BP Location: Right arm, Patient Position: Sitting)   Pulse 88   Resp 16   Ht 1.753 m (5' 9\")   Wt 90.7 kg (200 lb)   SpO2 98%   BMI 29.53 kg/m      Constitutional: Alert, no acute distress.    [place]:  ambulates without assistance  Unable to heel walk w/o left foot dropping down, slight issues with toe walking on left only.  Performs single leg calf raises equally well.     Neurological:    Strength " (L/R)  5/5 Hip Flexion  5-(very slight)/5 Knee Extension   4/5 Ankle Dorsiflexion  4+/5 Extensor Hallucis Longus  5/5 Plantar Flexion  5/5 Foot Eversion  5/5 Foot Inversion    Sensation: SILT    Incision:  Healing well, small pinpoint area in the lower aspect of incision with small amount of bloody drainage when pressed.  No purulency or significant pain.  No redness or swelling.      IMAGING:  No new imaging.    ASSESSMENT:  Antonio Burger is a 48 year old male who is s/p L4-S1 hemilam, microdisc by Dr. Jay on 12/12/23.  Prior to surgery patient was experiencing left foot weakness, single-leg calf-raise asymmetry and difficulty with toe & heel walking.    Patient is about 6 weeks out from surgery.  He has improved with strength in his left leg, but still having some issues with heal walking and noticing this when he is walking.  Small pinpoint area in incision that has not yet healed.  Not concerned for infection.      PLAN:    May lift up to 20 pounds for the next month and then increase gradually in 5 pound increments as tolerated up to 50 pounds.       Monitor incision and clean daily.  Advised not to use anything but soap and water. Cover lightly if there is leakage.      Hypertension management - per PCP, has started meds.      Follow up in 6 weeks without imaging with Dr. Jay.      Advised to contact PT for formal assessment and learn exercises to promote strengthening of left foot.      Patient is in agreement with this plan and states no further questions.      Rosalba Norris PA-C  Department of Neurosurgery  Office: [phone]

## 2024-02-06 NOTE — PATIENT INSTRUCTIONS
Follow up in 6 weeks with Dr. Jay.    Okay to lift <20 pounds, gradually increase as able up to 50 pounds.    RTW slip provided to patient with restrictions.    Recommend formal PT to work on foot strengthening.      Monitor incision and clean daily.  If concerning changes, please contact us immediately.

## 2024-02-07 ENCOUNTER — TELEPHONE (OUTPATIENT)
Dept: FAMILY MEDICINE | Facility: CLINIC | Age: 49
End: 2024-02-07

## 2024-02-07 NOTE — TELEPHONE ENCOUNTER
Called patient and left him a voicemail letting him know that provider wanted us to reach out to schedule a BP follow up. Let the patient know he could self schedule via [place] or call the clinic directly at [phone].    Zeinab Alvarez, [facility]

## 2024-02-07 NOTE — TELEPHONE ENCOUNTER
I tried to call Antonio to schedule a blood pressure follow up appointment, but he did not answer. I left him a voicemail encouraging him to call back so we get him scheduled soon.

## 2024-02-08 ENCOUNTER — MYC REFILL (OUTPATIENT)
Dept: FAMILY MEDICINE | Facility: CLINIC | Age: 49
End: 2024-02-08

## 2024-02-08 ENCOUNTER — TELEPHONE (OUTPATIENT)
Dept: PHARMACY | Facility: CLINIC | Age: 49
End: 2024-02-08
Payer: COMMERCIAL

## 2024-02-08 DIAGNOSIS — I10 BENIGN ESSENTIAL HYPERTENSION: ICD-10-CM

## 2024-02-08 RX ORDER — VALSARTAN 40 MG/1
40 TABLET ORAL 2 TIMES DAILY
Qty: 60 TABLET | Refills: 0 | Status: SHIPPED | OUTPATIENT
Start: 2024-02-08 | End: 2024-02-09

## 2024-02-09 ENCOUNTER — OFFICE VISIT (OUTPATIENT)
Dept: FAMILY MEDICINE | Facility: CLINIC | Age: 49
End: 2024-02-09
Payer: COMMERCIAL

## 2024-02-09 VITALS
RESPIRATION RATE: 16 BRPM | HEIGHT: 68 IN | SYSTOLIC BLOOD PRESSURE: 132 MMHG | OXYGEN SATURATION: 95 % | WEIGHT: 202.3 LBS | HEART RATE: 97 BPM | BODY MASS INDEX: 30.66 KG/M2 | TEMPERATURE: 98.4 F | DIASTOLIC BLOOD PRESSURE: 88 MMHG

## 2024-02-09 DIAGNOSIS — Z13.220 SCREENING FOR LIPID DISORDERS: ICD-10-CM

## 2024-02-09 DIAGNOSIS — I10 BENIGN ESSENTIAL HYPERTENSION: Primary | ICD-10-CM

## 2024-02-09 RX ORDER — VALSARTAN 40 MG/1
40 TABLET ORAL 2 TIMES DAILY
Qty: 180 TABLET | Refills: 3 | Status: SHIPPED | OUTPATIENT
Start: 2024-02-09 | End: 2025-02-03

## 2024-02-09 ASSESSMENT — PAIN SCALES - GENERAL: PAINLEVEL: NO PAIN (0)

## 2024-02-09 NOTE — NURSING NOTE
"ROOM:1  TORRI GUILLEN    Preferred Name: Antonio     How did you hear about us?  Current Patient     Services Provided? No    48 year old  Chief Complaint   Patient presents with     Clinic Care Coordination - Follow-up     Follow-up appointment for high blood pressure.        Blood pressure 132/88, pulse 97, temperature 98.4  F (36.9  C), temperature source Oral, resp. rate 16, height 1.722 m (5' 7.8\"), weight 91.8 kg (202 lb 4.8 oz), SpO2 95%. Body mass index is 30.94 kg/m .  BP completed using cuff size:        Patient Active Problem List   Diagnosis     CARDIOVASCULAR SCREENING; LDL GOAL LESS THAN 160     Cellulitis     Tobacco abuse     Nummular eczema     Nummular dermatitis     Lumbar radiculopathy     Acute left-sided low back pain with left-sided sciatica     Lumbar disc herniation with radiculopathy     Dermatitis     Primary hypertension     Elevated serum creatinine     S/P lumbar microdiscectomy       Wt Readings from Last 2 Encounters:   24 91.8 kg (202 lb 4.8 oz)   24 90.7 kg (200 lb)     BP Readings from Last 3 Encounters:   24 132/88   24 (!) 150/93   23 (!) 149/100       Allergies   Allergen Reactions     No Known Drug Allergy        Current Outpatient Medications   Medication     amphetamine-dextroamphetamine (ADDERALL) 30 MG tablet     clobetasol (TEMOVATE) 0.05 % external ointment     dupilumab (DUPIXENT) 300 MG/2ML prefilled syringe     triamcinolone (KENALOG) 0.1 % external ointment     valsartan (DIOVAN) 40 MG tablet     venlafaxine (EFFEXOR XR) 37.5 MG 24 hr capsule     gabapentin (NEURONTIN) 600 MG tablet     No current facility-administered medications for this visit.       Social History     Tobacco Use     Smoking status: Former     Packs/day: 1.00     Years: 13.00     Additional pack years: 0.00     Total pack years: 13.00     Types: Cigarettes     Quit date: 2011     Years since quittin.1     Smokeless tobacco: Former   Vaping Use " "    Vaping Use: Never used   Substance Use Topics     Alcohol use: Not Currently     Comment: occ     Drug use: No       Honoring Choices - Health Care Directive Guide offered to patient at time of visit.    Health Maintenance Due   Topic Date Due     YEARLY PREVENTIVE VISIT  Never done     HEPATITIS B IMMUNIZATION (1 of 3 - 3-dose series) Never done     Pneumococcal Vaccine: Pediatrics (0 to 5 Years) and At-Risk Patients (6 to 64 Years) (1 of 2 - PCV) Never done     COLORECTAL CANCER SCREENING  Never done     HIV SCREENING  Never done     HEPATITIS C SCREENING  Never done     LIPID  Never done     INFLUENZA VACCINE (1) 09/01/2023     COVID-19 Vaccine (3 - 2023-24 season) 09/01/2023       Immunization History   Administered Date(s) Administered     COVID-19 MONOVALENT 12+ (Pfizer) 11/24/2021     COVID-19 Vaccine (Corinne) 04/06/2021     Influenza Vaccine >6 months,quad, PF 10/13/2014     TDAP Vaccine (Adacel) 10/13/2014       No results found for: \"PAP\"    Recent Labs   Lab Test 12/13/23  0604 12/12/23  0712 12/07/23  1625 11/30/23  0927   A1C  --   --   --  5.5   CR 1.23* 1.32*   < > 1.21*   GFRESTIMATED 73 67   < > 74   ALBUMIN  --   --   --  4.3   POTASSIUM  --   --   --  3.6    < > = values in this interval not displayed.           2/6/2024     9:41 AM 12/4/2023     5:12 PM   PHQ-2 ( 1999 Pfizer)   Q1: Little interest or pleasure in doing things 1 0   Q2: Feeling down, depressed or hopeless 0 0   PHQ-2 Score 1 0            No data to display                     No data to display                     No data to display                Laura Badillo    February 9, 2024 3:03 PM    "

## 2024-02-09 NOTE — PATIENT INSTRUCTIONS
Please monitor your blood pressure at home and let me know if your top number is consistently above 130.

## 2024-02-09 NOTE — PROGRESS NOTES
Today's Date: Feb 9, 2024     Patient Antonio Burger 1975 presents to the clinic today to address   Chief Complaint   Patient presents with    Clinic Care Coordination - Follow-up     Follow-up appointment for high blood pressure.              SUBJECTIVE     History of Present Illness:      48-year-old male past medical history significant for hypertension, narcolepsy, anxiety/MDD presents to clinic for blood pressure follow-up.  Patient was started on valsartan 40 mg twice daily on 12/8/2023 due to elevated creatinine and hypertension prior to his Left Lumbar 4-5, Lumbar 5-Sacral 1 Hemilaminectomy and Microdiskectomy, with Microscope on 12/12/2023.  Patient reports that his procedure went very well.  He is able to exercise more and is currently walking 2 miles per day.  He reports he also purchased home exercise equipment.  Unfortunately, patient has been off his valsartan for few days as he reports that his pharmacy ran out of the medication.  He restarted the medication this morning. He has not been checking his blood pressure at home as he has misplaced his blood pressure cuff. He denies side effects with the medications.  Overall, patient denies acute concerns or symptoms at time of exam.      Review of Systems   Constitutional, HEENT, cardiovascular, pulmonary, gi and gu systems are negative, except as otherwise noted.        Allergies   Allergen Reactions    No Known Drug Allergy         Current Outpatient Medications   Medication Instructions    amphetamine-dextroamphetamine (ADDERALL) 30 MG tablet 30 mg, Oral, 4 TIMES DAILY    clobetasol (TEMOVATE) 0.05 % external ointment Apply twice daily as needed for itchy spots on legs.    Dupixent 300 mg, Subcutaneous, EVERY 14 DAYS    gabapentin (NEURONTIN) 600 mg, Oral, 3 TIMES DAILY PRN    triamcinolone (KENALOG) 0.1 % external ointment Apply twice daily to any itchy areas on hands, arms, or legs.    valsartan (DIOVAN) 40 mg, Oral, 2 TIMES DAILY    venlafaxine  "(EFFEXOR XR) 37.5 mg, Oral, EVERY MORNING       Past Medical History:   Diagnosis Date    Anxiety     Depressive disorder     Eczema     Lipoma of other specified sites 2007     neck area- plans observation    Narcolepsy without cataplexy in conditions classified elsewhere     Tobacco use disorder quit on 12    Smokes 1 ppd. Started at 16 years        Family History   Problem Relation Age of Onset    Diabetes Mother         type 2    Hypertension Mother     Depression Mother     Heart Failure Mother     Gastrointestinal Disease Father         reflux    Cancer Maternal Grandmother         stomach    Cancer Paternal Grandmother         ?    Anesthesia Reaction No family hx of     Deep Vein Thrombosis (DVT) No family hx of         Social History     Tobacco Use    Smoking status: Former     Packs/day: 1.00     Years: 13.00     Additional pack years: 0.00     Total pack years: 13.00     Types: Cigarettes     Quit date: 2011     Years since quittin.1    Smokeless tobacco: Former   Vaping Use    Vaping Use: Never used   Substance Use Topics    Alcohol use: Not Currently     Comment: occ    Drug use: No        History   Sexual Activity    Sexual activity: Yes    Partners: Female    Birth control/ protection: Pill             No data to display                 Immunization History   Administered Date(s) Administered    COVID-19 MONOVALENT 12+ (Pfizer) 2021    COVID-19 Vaccine (MindJolt) 2021    Influenza Vaccine >6 months,quad, PF 10/13/2014    TDAP Vaccine (Adacel) 10/13/2014                 OBJECTIVE     /88 (BP Location: Left arm, Patient Position: Sitting, Cuff Size: Adult Large)   Pulse 97   Temp 98.4  F (36.9  C) (Oral)   Resp 16   Ht 1.722 m (5' 7.8\")   Wt 91.8 kg (202 lb 4.8 oz)   SpO2 95%   BMI 30.94 kg/m       Labs:  Lab Results   Component Value Date    WBC 5.1 2023    HGB 14.8 2023    HCT 43.4 2023     2023     2023    BUN " 15.3 11/30/2023    CO2 25 11/30/2023    INR 1.03 11/30/2023        Physical Exam  Constitutional:       General: He is not in acute distress.     Appearance: He is not ill-appearing.   Cardiovascular:      Rate and Rhythm: Normal rate and regular rhythm.      Heart sounds: Normal heart sounds. No murmur heard.  Pulmonary:      Effort: Pulmonary effort is normal. No respiratory distress.      Breath sounds: Normal breath sounds. No wheezing or rales.   Musculoskeletal:      Cervical back: Neck supple.      Right lower leg: No edema.      Left lower leg: No edema.   Lymphadenopathy:      Cervical: No cervical adenopathy.   Neurological:      General: No focal deficit present.      Mental Status: He is alert.   Psychiatric:         Thought Content: Thought content normal.         Judgment: Judgment normal.               ASSESSMENT/PLAN     1. Benign essential hypertension  This is a pleasant 48-year-old male with past medical history significant for  hypertension, narcolepsy, and anxiety/MDD who presents for blood pressure follow-up.  Patient currently on valsartan 40 mg twice daily (he was recently off this medication since his pharmacy ran out of it, however, he restarted this morning).  Patient reports he misplaced his home blood pressure cuff.  He denies concerns/side effects with the medication.    Blood pressure today is much improved at 132/88.  Considering this and the fact that the patient is able to exercise more since his back surgery, will continue valsartan 40 mg twice daily and have the patient complete the following labs in March (we decided to hold off on labs today as his creatinine may be falsely elevated since he has recently been off valsartan).  He is also encouraged to follow low-sodium diet and to increase cardiovascular exercise.  - valsartan (DIOVAN) 40 MG tablet; Take 1 tablet (40 mg) by mouth 2 times daily for 360 days  Dispense: 180 tablet; Refill: 3  - Basic metabolic panel; Future  -  Albumin Random Urine Quantitative with Creat Ratio; Future  - Hemoglobin A1c; Future    2. Screening for lipid disorders  - Lipid panel reflex to direct LDL Non-fasting; Future          Follow-Up:  - Follow up in 3 month(s) for annual physical, or if symptoms worsen or fail to improve.     Options for treatment and follow-up care were reviewed with the patient. Patient engaged in the decision making process and verbalized understanding of the options discussed and agreed with the final plan.  AVS printed and given to patient.    ELICIA Scott [facility]  [address] [place] [id number]  [phone]        Note: Chart documentation was done in part with Dragon Voice Recognition software.  Although reviewed after completion, some word and grammatical errors may remain. Please contact author for any clarification or concerns.

## 2024-02-14 RX ORDER — VALSARTAN 40 MG/1
40 TABLET ORAL 2 TIMES DAILY
Qty: 180 TABLET | OUTPATIENT
Start: 2024-02-14

## 2024-03-21 ENCOUNTER — OFFICE VISIT (OUTPATIENT)
Dept: NEUROSURGERY | Facility: CLINIC | Age: 49
End: 2024-03-21
Attending: PHYSICIAN ASSISTANT
Payer: COMMERCIAL

## 2024-03-21 VITALS
DIASTOLIC BLOOD PRESSURE: 83 MMHG | HEIGHT: 69 IN | WEIGHT: 206 LBS | HEART RATE: 79 BPM | OXYGEN SATURATION: 99 % | BODY MASS INDEX: 30.51 KG/M2 | SYSTOLIC BLOOD PRESSURE: 133 MMHG

## 2024-03-21 DIAGNOSIS — M51.16 LUMBAR DISC HERNIATION WITH RADICULOPATHY: Primary | ICD-10-CM

## 2024-03-21 PROCEDURE — 99212 OFFICE O/P EST SF 10 MIN: CPT | Performed by: NEUROLOGICAL SURGERY

## 2024-03-21 ASSESSMENT — PAIN SCALES - GENERAL: PAINLEVEL: NO PAIN (0)

## 2024-03-21 NOTE — LETTER
"3/21/2024       RE: Antonio Burger  [address]  [place] [address]     Dear Colleague,    Thank you for referring your patient, Antonio Burger, to the [facility] [place] at . Please see a copy of my visit note below.    CC: scheduled postop follow-up      Date of Admission: 12/12/2023    Date of Discharge: 12/13/2023     Admission/Discharge Physician: Emerita Jay MD     Indications for Admission: elective spine surgery     Admission Diagnosis/Discharge Diagnosis:  Lumbar disc herniation with radiculopathy [M51.16]      Date of Surgery: 12/12/23     Preoperative diagnosis: left L4-5, left L5-S1 disc herniations with left L5 and left S1 radiculopathy   Elevated creatinine levels with mild renal insufficiency  Obesity Body mass index is 30.64 kg/m .      Postoperative diagnosis: same   Procedure performed:  1.  Left L4-5, L5-S1 hemilaminectomy and microdiscectomy   2. Intraoperative microscope        Surgeon: Emerita Jay MD     Assistant:Evangelist Castellanos, PGY-6 spine fellow. There was no qualified resident available to assist in surgery        INDICATIONS FOR SURGERY:  Antonio Burger is a 48 year old male with severe radiculopathy referrable to disc herniations at both L4-5, and L5-S1.  The patient failed nonoperative management with severe debilitating ongoing symptoms, please refer to my clinic note for full details of presentation.      He had slightly elevated creatinine levels on preoperative labs as well as preoperative hypertension for which the PAC optimization clinic sent him to see a primary care provider to establish care.       HPI:  Reports resolution of preop leg pain, no leg pain, no concerns currently    IMAGING: none new      EXAM:  /83 (BP Location: Right arm, Patient Position: Sitting, Cuff Size: Adult Large)   Pulse 79   Ht 1.753 m (5' 9\")   Wt 93.4 kg (206 lb)   SpO2 99%   BMI 30.42 " kg/m    Incision well-healed  Strength 5/5 B HF/KE/DF/EHL/PF; able to do ten single-leg standing calf raises on each leg (improvement from preop)  Sensation grossly intact to light touch    ASSESSMENT:  Doing well at 3 months s/p left L4-5, L5-S1 microdiskectomies    PLAN:  Advised keep BMI less than 30 and limit lifting to less than 50 pounds, continue to avoid nicotine  Return to clinic on an as-needed basis  Discussed with patient that I will be moving my practice from the [facility] to [facility] in [place], patient advised that they can continue [facility]up [facility]  with our PA and my partners are available to assist if needed.      Again, thank you for allowing me to participate in the care of your patient.      Sincerely,    Emerita Jay MD

## 2024-03-21 NOTE — PROGRESS NOTES
"CC: scheduled postop follow-up      Date of Admission: 12/12/2023    Date of Discharge: 12/13/2023     Admission/Discharge Physician: Emerita Jay MD     Indications for Admission: elective spine surgery     Admission Diagnosis/Discharge Diagnosis:  Lumbar disc herniation with radiculopathy [M51.16]      Date of Surgery: 12/12/23     Preoperative diagnosis: left L4-5, left L5-S1 disc herniations with left L5 and left S1 radiculopathy   Elevated creatinine levels with mild renal insufficiency  Obesity Body mass index is 30.64 kg/m .      Postoperative diagnosis: same   Procedure performed:  1.  Left L4-5, L5-S1 hemilaminectomy and microdiscectomy   2. Intraoperative microscope        Surgeon: Emerita Jay MD     Assistant:Evangelist Castellanos, PGY-6 spine fellow. There was no qualified resident available to assist in surgery        INDICATIONS FOR SURGERY:  Antonio Burger is a 48 year old male with severe radiculopathy referrable to disc herniations at both L4-5, and L5-S1.  The patient failed nonoperative management with severe debilitating ongoing symptoms, please refer to my clinic note for full details of presentation.      He had slightly elevated creatinine levels on preoperative labs as well as preoperative hypertension for which the PAC optimization clinic sent him to see a primary care provider to establish care.       HPI:  Reports resolution of preop leg pain, no leg pain, no concerns currently    IMAGING: none new      EXAM:  /83 (BP Location: Right arm, Patient Position: Sitting, Cuff Size: Adult Large)   Pulse 79   Ht 1.753 m (5' 9\")   Wt 93.4 kg (206 lb)   SpO2 99%   BMI 30.42 kg/m    Incision well-healed  Strength 5/5 B HF/KE/DF/EHL/PF; able to do ten single-leg standing calf raises on each leg (improvement from preop)  Sensation grossly intact to light touch    ASSESSMENT:  Doing well at 3 months s/p left L4-5, L5-S1 microdiskectomies    PLAN:  Advised keep BMI less than 30 and limit " lifting to less than 50 pounds, continue to avoid nicotine  Return to clinic on an as-needed basis  Discussed with patient that I will be moving my practice from the [facility] to [facility] in [place], patient advised that they can continue followup at [facility]  with our PA and my partners are available to assist if needed.

## 2024-03-21 NOTE — PATIENT INSTRUCTIONS
Follow-up on an as-needed basis. For your spine health, please keep lifting under 50 pounds, use your legs to lift instead of your back. Maintain a healthy body weight with a Body Mass Index under 30, and avoid nicotine.    Thank you for choosing [facility] for your surgical needs.

## 2024-06-14 ENCOUNTER — TELEPHONE (OUTPATIENT)
Dept: PHARMACY | Facility: CLINIC | Age: 49
End: 2024-06-14
Payer: COMMERCIAL

## 2024-06-22 ENCOUNTER — HEALTH MAINTENANCE LETTER (OUTPATIENT)
Age: 49
End: 2024-06-22

## 2024-07-15 ENCOUNTER — MYC MEDICAL ADVICE (OUTPATIENT)
Dept: DERMATOLOGY | Facility: CLINIC | Age: 49
End: 2024-07-15

## 2024-08-05 NOTE — TELEPHONE ENCOUNTER
Prior Authorization Approval    Medication: DUPIXENT 300 MG/2ML SC SOSY  Authorization Effective Date: 2/7/2024  Authorization Expiration Date: 2/6/2025  Approved Dose/Quantity: 2 syringes per 28 days  Reference #: [id number]   Insurance Company: [employer] [phone] Fax [phone]  Expected CoPay: $ 1,000  CoPay Card Available: Yes    Financial Assistance Needed: Enrolled in co-pay card  Which Pharmacy is filling the prescription: [facility] - [address]  Pharmacy Notified: Yes  Patient Notified: By pharmacy      PA Initiation    Medication: DUPIXENT 300 MG/2ML SC SOSY  Insurance Company: [employer] [phone] Fax [phone]  Pharmacy Filling the Rx: [facility] - [address]  Filling Pharmacy Phone:    Filling Pharmacy Fax:    Start Date: 2/7/2024     no

## 2024-11-11 DIAGNOSIS — L20.84 INTRINSIC ATOPIC DERMATITIS: ICD-10-CM

## 2024-11-12 NOTE — TELEPHONE ENCOUNTER
Medication Requested:   Disp Refills Start End SKYLAR   dupilumab (DUPIXENT) 300 MG/2ML prefilled syringe 4 mL 11 12/4/2023 -- No   Sig - Route: Inject 2 mLs (300 mg) Subcutaneous every 14 days - Subcutaneous     ----------------------  Last Office Visit : 12/4/2023  [facility] Office visit:  0  ----------------------        Refill decision: Refill pended and routed to the provider for review/determination due to the following criteria not met:     Medication not on [facility], [facility], [facility] refill protocol

## 2024-11-13 RX ORDER — DUPILUMAB 300 MG/2ML
INJECTION, SOLUTION SUBCUTANEOUS
Qty: 4 ML | Refills: 11 | Status: SHIPPED | OUTPATIENT
Start: 2024-11-13

## 2025-01-30 ENCOUNTER — TELEPHONE (OUTPATIENT)
Dept: DERMATOLOGY | Facility: CLINIC | Age: 50
End: 2025-01-30
Payer: COMMERCIAL

## 2025-01-30 NOTE — TELEPHONE ENCOUNTER
PA Initiation    Medication: DUPIXENT 300 MG/2ML SC [facility]  Insurance Company: [employer] - Phone [phone] Fax [phone]  Pharmacy Filling the Rx:    Filling Pharmacy Phone:    Filling Pharmacy Fax:    Start Date: 1/30/2025    Key:[id number]

## 2025-02-06 NOTE — TELEPHONE ENCOUNTER
Prior Authorization Approval    Medication: DUPIXENT 300 MG/2ML SC SOSY  Authorization Effective Date: 2/3/2025  Authorization Expiration Date: 2/3/2026  Approved Dose/Quantity: UD  Reference #: [id number]   Insurance Company: [employer] - Phone [phone] Fax [phone]  Expected CoPay: $    CoPay Card Available:      Financial Assistance Needed: copay card on profile  Which Pharmacy is filling the prescription: [facility] - [place] [address]  Pharmacy Notified: NA- renewal   Patient Notified:  Approval letter sent from ins- renewal

## 2025-04-15 ENCOUNTER — OFFICE VISIT (OUTPATIENT)
Dept: OPTOMETRY | Facility: CLINIC | Age: 50
End: 2025-04-15
Payer: COMMERCIAL

## 2025-04-15 DIAGNOSIS — Z01.00 EXAMINATION OF EYES AND VISION: Primary | ICD-10-CM

## 2025-04-15 DIAGNOSIS — H52.4 PRESBYOPIA: ICD-10-CM

## 2025-04-15 DIAGNOSIS — H11.442 CONJUNCTIVAL CYST OF LEFT EYE: ICD-10-CM

## 2025-04-15 PROCEDURE — 92015 DETERMINE REFRACTIVE STATE: CPT | Performed by: OPTOMETRIST

## 2025-04-15 PROCEDURE — 92004 COMPRE OPH EXAM NEW PT 1/>: CPT | Performed by: OPTOMETRIST

## 2025-04-15 ASSESSMENT — TONOMETRY
OD_IOP_MMHG: 18.9
IOP_METHOD: ICARE
OS_IOP_MMHG: 17.8

## 2025-04-15 ASSESSMENT — CONF VISUAL FIELD
OS_NORMAL: 1
OS_SUPERIOR_TEMPORAL_RESTRICTION: 0
OS_SUPERIOR_NASAL_RESTRICTION: 0
OD_INFERIOR_TEMPORAL_RESTRICTION: 0
OD_SUPERIOR_TEMPORAL_RESTRICTION: 0
OD_INFERIOR_NASAL_RESTRICTION: 0
OS_INFERIOR_TEMPORAL_RESTRICTION: 0
OD_SUPERIOR_NASAL_RESTRICTION: 0
OS_INFERIOR_NASAL_RESTRICTION: 0
OD_NORMAL: 1

## 2025-04-15 ASSESSMENT — VISUAL ACUITY
OS_SC+: -2
CORRECTION_TYPE: GLASSES
OS_SC: 20/20
OS_CC: 20/20
OD_SC: 20/20
OD_CC: 20/20
METHOD: SNELLEN - LINEAR

## 2025-04-15 ASSESSMENT — KERATOMETRY
OS_AXISANGLE_DEGREES: 107
OS_AXISANGLE2_DEGREES: 017
OD_K2POWER_DIOPTERS: 45.75
OD_AXISANGLE_DEGREES: 131
OD_AXISANGLE2_DEGREES: 041
OS_K1POWER_DIOPTERS: 45.75
OS_K2POWER_DIOPTERS: 46.25
OD_K1POWER_DIOPTERS: 45.25

## 2025-04-15 ASSESSMENT — REFRACTION_WEARINGRX
OD_SPHERE: +2.50
OS_SPHERE: +2.50
SPECS_TYPE: OTC READERS

## 2025-04-15 ASSESSMENT — REFRACTION_MANIFEST
OD_SPHERE: PLANO
OD_ADD: +2.00
OS_SPHERE: PLANO
OS_ADD: +2.00

## 2025-04-15 ASSESSMENT — CUP TO DISC RATIO
OS_RATIO: 0.2
OD_RATIO: 0.2

## 2025-04-15 ASSESSMENT — SLIT LAMP EXAM - LIDS
COMMENTS: NORMAL
COMMENTS: NORMAL

## 2025-04-15 ASSESSMENT — EXTERNAL EXAM - RIGHT EYE: OD_EXAM: NORMAL

## 2025-04-15 ASSESSMENT — EXTERNAL EXAM - LEFT EYE: OS_EXAM: NORMAL

## 2025-04-15 NOTE — PROGRESS NOTES
Chief Complaint   Patient presents with    Annual Eye Exam         Last Eye Exam: 30 years  Dilated Previously: Yes    What are you currently using to see? otc readers       Distance Vision Acuity: Satisfied with vision    Near Vision Acuity: Satisfied with vision while reading  with otc readers    Eye Comfort: good,but left eye  has bump on eye x 3 weeks- on white of eye- showed pictures- inflamed nasal with +1 redness and corkscrew vessel- not red anymore  Do you use eye drops? : Yes: 3 weeks  Occupation or Hobbies: tech support     Lora Tong Optometric Assistant, A.B.O.C.      Medical, surgical and family histories reviewed and updated 4/15/2025.       OBJECTIVE: See Ophthalmology exam    ASSESSMENT:    ICD-10-CM    1. Examination of eyes and vision  Z01.00 EYE EXAM (SIMPLE-NONBILLABLE)      2. Presbyopia  H52.4 REFRACTION      3. Conjunctival cyst of left eye  H11.442 EYE EXAM (SIMPLE-NONBILLABLE)          PLAN:     Patient Instructions   Eyeglass prescription given.    Artificial tears- 1 drop both eyes once before bedtime and once in the morning then 2 x day as needed.      If cyst becomes more bothersome then referral to ophthalmology for procedure to remove it.    Return in 1 year for a complete eye exam or sooner if needed.    Munir De Leon, OD

## 2025-04-15 NOTE — LETTER
4/15/2025      Antonio Burger  [address]  [place] [address]      Dear Colleague,    Thank you for referring your patient, Antonio Burger, to the [facility]. Please see a copy of my visit note below.    Chief Complaint   Patient presents with     Annual Eye Exam         Last Eye Exam: 30 years  Dilated Previously: Yes    What are you currently using to see? otc readers       Distance Vision Acuity: Satisfied with vision    Near Vision Acuity: Satisfied with vision while reading  with otc readers    Eye Comfort: good,but left eye  has bump on eye x 3 weeks- on white of eye- showed pictures- inflamed nasal with +1 redness and corkscrew vessel- not red anymore  Do you use eye drops? : Yes: 3 weeks  Occupation or Hobbies: tech support     Lora Tong Optometric Assistant, A.B.O.C.      Medical, surgical and family histories reviewed and updated 4/15/2025.       OBJECTIVE: See Ophthalmology exam    ASSESSMENT:    ICD-10-CM    1. Examination of eyes and vision  Z01.00 EYE EXAM (SIMPLE-NONBILLABLE)      2. Presbyopia  H52.4 REFRACTION      3. Conjunctival cyst of left eye  H11.442 EYE EXAM (SIMPLE-NONBILLABLE)          PLAN:     Patient Instructions   Eyeglass prescription given.    Artificial tears- 1 drop both eyes once before bedtime and once in the morning then 2 x day as needed.      If cyst becomes more bothersome then referral to ophthalmology for procedure to remove it.    Return in 1 year for a complete eye exam or sooner if needed.    Munir De Leon, OD         Again, thank you for allowing me to participate in the care of your patient.        Sincerely,        Munir De Leon, OD    Electronically signed
04-Apr-2019 22:00

## 2025-04-15 NOTE — PATIENT INSTRUCTIONS
Eyeglass prescription given.    Artificial tears- 1 drop both eyes once before bedtime and once in the morning then 2 x day as needed.      If cyst becomes more bothersome then referral to ophthalmology for procedure to remove it.    Return in 1 year for a complete eye exam or sooner if needed.    Munir De Leon, HA    The affects of the dilating drops last for 4- 6 hours.  You will be more sensitive to light and vision will be blurry up close.  Do not drive if you do not feel comfortable.  Mydriatic sunglasses were given if needed.      Optometry Providers       Clinic Locations                                 Telephone Number   Dr. Catarina Gibson    [facility]  [place] [phone]     [facility] Hours:                [place] [facility] Hours:       [place] [facility] Hours:   [address] [facility] [address] [address]   [address] [facility] [address]      [address] [facility]  [place] [address]   [place] [address]    [place] [address]  Phone: [phone]                    Phone: [phone]     Phone: [phone]                      Monday 8:00-6:00                          Monday 8:00-6:00                          Monday 8:00-6:00              Tuesday 8:00-6:00                          Tuesday 8:00-6:00                          Tuesday 8:00-6:00              Wednesday 8:00-6:00                  Wednesday 8:00-6:00                   Wednesday 8:00-6:00      Thursday 8:00-6:00                        Thursday 8:00-6:00                         Thursday 8:00-6:00            Friday 8:00-5:00                              Friday 8:00-5:00                              Friday 8:00-5:00    [place] [facility] Hours:   [address] [facility] [place] [address]  [phone]    Monday 9:00-6:00  Tuesday 9:00-6:00  Wednesday 9:00-6:00  Thursday 9:00-6:00  Friday  9:00-5:00  As always, Thank you for trusting us with your health care needs!

## 2025-04-21 DIAGNOSIS — I10 BENIGN ESSENTIAL HYPERTENSION: ICD-10-CM

## 2025-04-21 RX ORDER — VALSARTAN 40 MG/1
40 TABLET ORAL 2 TIMES DAILY
OUTPATIENT
Start: 2025-04-21

## 2025-07-12 ENCOUNTER — HEALTH MAINTENANCE LETTER (OUTPATIENT)
Age: 50
End: 2025-07-12

## 2025-08-14 ENCOUNTER — OFFICE VISIT (OUTPATIENT)
Dept: URGENT CARE | Facility: URGENT CARE | Age: 50
End: 2025-08-14
Payer: COMMERCIAL

## 2025-08-14 VITALS
RESPIRATION RATE: 16 BRPM | WEIGHT: 201.2 LBS | DIASTOLIC BLOOD PRESSURE: 89 MMHG | HEART RATE: 81 BPM | OXYGEN SATURATION: 97 % | SYSTOLIC BLOOD PRESSURE: 131 MMHG | TEMPERATURE: 98.3 F | HEIGHT: 68 IN | BODY MASS INDEX: 30.49 KG/M2

## 2025-08-14 DIAGNOSIS — S69.91XA INJURY OF RIGHT THUMB, INITIAL ENCOUNTER: Primary | ICD-10-CM
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
## (undated) DEVICE — GLOVE BIOGEL PI ULTRATOUCH G SZ 7.0 42170

## (undated) DEVICE — BLADE CLIPPER SGL USE 9680

## (undated) DEVICE — DRAPE C-ARM W/STRAPS 42X72" 07-CA104

## (undated) DEVICE — GLOVE BIOGEL PI MICRO INDICATOR UNDERGLOVE SZ 7.5 48975

## (undated) DEVICE — TUBING EXTENSION SET MICROBORE 6" LL 2N1194

## (undated) DEVICE — LINEN TOWEL PACK X30 5481

## (undated) DEVICE — Device

## (undated) DEVICE — SYR 10ML FINGER CONTROL W/O NDL 309695

## (undated) DEVICE — SUTURE VICRYL+ 1 CT-1 CR 8X18" VIO VCP741D

## (undated) DEVICE — ESU GROUND PAD UNIVERSAL W/O CORD

## (undated) DEVICE — POSITIONER ARMBOARD FOAM 1PAIR LF FP-ARMB1

## (undated) DEVICE — TRAY PAIN INJECTION 97A 640

## (undated) DEVICE — SU MONOCRYL 4-0 PS-2 18" UND Y496G

## (undated) DEVICE — WIPES FOLEY CARE SURESTEP PROVON DFC100

## (undated) DEVICE — SU VICRYL 2-0 SH 27" UND J417H

## (undated) DEVICE — SOL WATER IRRIG 1000ML BOTTLE 2F7114

## (undated) DEVICE — DECANTER TRANSFER DEVICE 2008S

## (undated) DEVICE — TOOL DISSECT MIDAS MR8 14CM MATCH HEAD 3MM MR8-14MH30

## (undated) DEVICE — GLOVE PROTEXIS POWDER FREE SMT 7.0  2D72PT70X

## (undated) DEVICE — CATH TRAY FOLEY SURESTEP 16FR W/URINE MTR STATLK LF A303416A

## (undated) DEVICE — DRSG PRIMAPORE 03 1/8X6" 66000318

## (undated) DEVICE — RX SURGIFLO HEMOSTATIC MATRIX W/THROMBIN 8ML 2994

## (undated) DEVICE — LINEN BACK PACK 5440

## (undated) DEVICE — SOL NACL 0.9% IRRIG 1000ML BOTTLE 2F7124

## (undated) DEVICE — DRAPE MICROSCOPE MICRO-KOVER LEICA 48"X120" 09-MK651

## (undated) DEVICE — SU VICRYL 2-0 CT-2 8X18" UND D8144

## (undated) DEVICE — PREP CHLORAPREP W/ORANGE TINT 10.5ML 260715

## (undated) DEVICE — NDL SPINAL 22GA 3.5" QUINCKE 405181

## (undated) DEVICE — SU DERMABOND ADVANCED .7ML DNX12

## (undated) DEVICE — MITT SURGICAL PREP HAIR REMOVER LATEX FREE 617142

## (undated) DEVICE — PREP CHLORAPREP CLEAR 3ML 930400

## (undated) DEVICE — SPONGE COTTONOID NEURO 1/2"X1/2" 30-054

## (undated) DEVICE — LINEN TOWEL PACK X5 5464

## (undated) DEVICE — SU VICRYL 1 CT-1 CR 8X18" J741D

## (undated) DEVICE — SUCTION MANIFOLD NEPTUNE 2 SYS 1 PORT 702-025-000

## (undated) RX ORDER — HYDROMORPHONE HYDROCHLORIDE 1 MG/ML
INJECTION, SOLUTION INTRAMUSCULAR; INTRAVENOUS; SUBCUTANEOUS
Status: DISPENSED
Start: 2023-12-12

## (undated) RX ORDER — ONDANSETRON 2 MG/ML
INJECTION INTRAMUSCULAR; INTRAVENOUS
Status: DISPENSED
Start: 2023-12-12

## (undated) RX ORDER — PROPOFOL 10 MG/ML
INJECTION, EMULSION INTRAVENOUS
Status: DISPENSED
Start: 2023-12-12

## (undated) RX ORDER — GABAPENTIN 300 MG/1
CAPSULE ORAL
Status: DISPENSED
Start: 2023-12-12

## (undated) RX ORDER — CEFAZOLIN SODIUM/WATER 2 G/20 ML
SYRINGE (ML) INTRAVENOUS
Status: DISPENSED
Start: 2023-12-12

## (undated) RX ORDER — FENTANYL CITRATE 50 UG/ML
INJECTION, SOLUTION INTRAMUSCULAR; INTRAVENOUS
Status: DISPENSED
Start: 2023-12-12

## (undated) RX ORDER — OXYCODONE HYDROCHLORIDE 5 MG/1
TABLET ORAL
Status: DISPENSED
Start: 2023-12-12

## (undated) RX ORDER — ACETAMINOPHEN 325 MG/1
TABLET ORAL
Status: DISPENSED
Start: 2023-12-12

## (undated) RX ORDER — DEXAMETHASONE SODIUM PHOSPHATE 4 MG/ML
INJECTION, SOLUTION INTRA-ARTICULAR; INTRALESIONAL; INTRAMUSCULAR; INTRAVENOUS; SOFT TISSUE
Status: DISPENSED
Start: 2023-12-12

## (undated) RX ORDER — BUPIVACAINE HYDROCHLORIDE AND EPINEPHRINE 5; 5 MG/ML; UG/ML
INJECTION, SOLUTION EPIDURAL; INTRACAUDAL; PERINEURAL
Status: DISPENSED
Start: 2023-12-12

## (undated) RX ORDER — FENTANYL CITRATE-0.9 % NACL/PF 10 MCG/ML
PLASTIC BAG, INJECTION (ML) INTRAVENOUS
Status: DISPENSED
Start: 2023-12-12